# Patient Record
Sex: MALE | Race: WHITE | Employment: FULL TIME | URBAN - NONMETROPOLITAN AREA
[De-identification: names, ages, dates, MRNs, and addresses within clinical notes are randomized per-mention and may not be internally consistent; named-entity substitution may affect disease eponyms.]

---

## 2017-10-24 ENCOUNTER — TELEPHONE (OUTPATIENT)
Dept: FAMILY MEDICINE CLINIC | Facility: CLINIC | Age: 47
End: 2017-10-24

## 2017-10-24 ENCOUNTER — OFFICE VISIT (OUTPATIENT)
Dept: FAMILY MEDICINE CLINIC | Facility: CLINIC | Age: 47
End: 2017-10-24

## 2017-10-24 VITALS
OXYGEN SATURATION: 98 % | BODY MASS INDEX: 40.43 KG/M2 | SYSTOLIC BLOOD PRESSURE: 130 MMHG | TEMPERATURE: 98 F | WEIGHT: 315 LBS | HEART RATE: 80 BPM | HEIGHT: 74 IN | DIASTOLIC BLOOD PRESSURE: 110 MMHG

## 2017-10-24 DIAGNOSIS — M54.12 LEFT CERVICAL RADICULOPATHY: Primary | ICD-10-CM

## 2017-10-24 PROCEDURE — L0120 CERV FLEX N/ADJ FOAM PRE OTS: HCPCS | Performed by: FAMILY MEDICINE

## 2017-10-24 PROCEDURE — 99214 OFFICE O/P EST MOD 30 MIN: CPT | Performed by: FAMILY MEDICINE

## 2017-10-24 RX ORDER — HYDROCODONE BITARTRATE AND ACETAMINOPHEN 10; 325 MG/1; MG/1
1 TABLET ORAL EVERY 4 HOURS PRN
Qty: 30 TABLET | Refills: 0 | Status: SHIPPED | OUTPATIENT
Start: 2017-10-24 | End: 2017-11-06 | Stop reason: ALTCHOICE

## 2017-10-24 RX ORDER — PREDNISONE 20 MG/1
TABLET ORAL
Qty: 18 TABLET | Refills: 0 | Status: SHIPPED | OUTPATIENT
Start: 2017-10-24 | End: 2017-11-06 | Stop reason: ALTCHOICE

## 2017-10-24 RX ORDER — HYDROCODONE BITARTRATE AND ACETAMINOPHEN 5; 325 MG/1; MG/1
1-2 TABLET ORAL EVERY 4 HOURS
COMMUNITY
Start: 2017-10-19 | End: 2018-05-27

## 2017-10-24 RX ORDER — TRAMADOL HYDROCHLORIDE 50 MG/1
50 TABLET ORAL 3 TIMES DAILY
Qty: 30 TABLET | Refills: 1 | Status: SHIPPED | OUTPATIENT
Start: 2017-10-24 | End: 2017-11-06 | Stop reason: ALTCHOICE

## 2017-10-24 NOTE — TELEPHONE ENCOUNTER
Patient needs MRI Cervical spine   Dx: U61.78    Calling to scheduled.   wants this done ASAP   Patient advised he will go wherever to have done   Authorization # [id number]  Valid 10/24-11/22/17    Spoke with Sherry Billings  Future Appointments  Date Time Provide

## 2017-10-24 NOTE — PROGRESS NOTES
Darrick Bonilla is a 41year old male. Patient presents with: Other: f/up from [facility] ER on 10/19--still having pain in LT side face, through neck, and down LT arm. ....... room 1      HPI:   Patient awoke in the morning on October 19 with severe excrucia shortness of breath   CARDIOVASCULAR: denies chest pain   GI: denies nausea, vomiting, diarrhea or abdominal pain   NEURO: denies headaches    EXAM:   BP (!) 130/110   Pulse 80   Temp 97.8 °F (36.6 °C) (Tympanic)   Ht 74\"   Wt (!) 317 lb 2 oz   SpO2 98% days then 1 po q am x 3 days      HYDROcodone-acetaminophen (NORCO)  MG Oral Tab 30 tablet 0      Sig: Take 1 tablet by mouth every 4 (four) hours as needed for Pain.       TraMADol HCl 50 MG Oral Tab 30 tablet 1      Sig: Take 1 tablet (50 mg total)

## 2017-10-25 ENCOUNTER — HOSPITAL ENCOUNTER (OUTPATIENT)
Dept: MRI IMAGING | Age: 47
Discharge: HOME | End: 2017-10-25
Attending: FAMILY MEDICINE
Payer: COMMERCIAL

## 2017-10-25 ENCOUNTER — TELEPHONE (OUTPATIENT)
Dept: FAMILY MEDICINE CLINIC | Facility: CLINIC | Age: 47
End: 2017-10-25

## 2017-10-25 DIAGNOSIS — M54.12 LEFT CERVICAL RADICULOPATHY: ICD-10-CM

## 2017-10-25 PROCEDURE — 72141 MRI NECK SPINE W/O DYE: CPT | Performed by: FAMILY MEDICINE

## 2017-10-25 NOTE — PROGRESS NOTES
Patient notified the MRI is unremarkable. He continues to have significant pain. Please try to get him an appointment with the pain clinic.

## 2017-10-25 NOTE — TELEPHONE ENCOUNTER
MRI was normal (see result note for more notes).    I called [facility] to try and get the patient in Thursday or Friday of this week   Dx: Left Cervical Radiculopathy

## 2017-10-26 ENCOUNTER — TELEPHONE (OUTPATIENT)
Dept: FAMILY MEDICINE CLINIC | Facility: CLINIC | Age: 47
End: 2017-10-26

## 2017-10-26 NOTE — TELEPHONE ENCOUNTER
Saundra Grace has gone for the day, Spoke with Pura Perez wanted to report Herssujey Chamberses has pinpoint tenderness over the left S-C.

## 2017-10-26 NOTE — TELEPHONE ENCOUNTER
Spoke with Luis Alberto Metzger, she scheduled OV with Dr. Remington Sanchez tomorrow @ [address] [place] [address] in [facility]. [address] [place] [address], [address]. She is calling the patient to advise.

## 2017-10-27 ENCOUNTER — OFFICE VISIT (OUTPATIENT)
Dept: SURGERY | Facility: CLINIC | Age: 47
End: 2017-10-27

## 2017-10-27 VITALS
WEIGHT: 300 LBS | DIASTOLIC BLOOD PRESSURE: 80 MMHG | HEART RATE: 82 BPM | SYSTOLIC BLOOD PRESSURE: 132 MMHG | HEIGHT: 74 IN | RESPIRATION RATE: 16 BRPM | BODY MASS INDEX: 38.5 KG/M2

## 2017-10-27 DIAGNOSIS — M54.12 CERVICAL RADICULITIS: Primary | ICD-10-CM

## 2017-10-27 DIAGNOSIS — M47.812 ARTHROPATHY OF CERVICAL FACET JOINT: ICD-10-CM

## 2017-10-27 PROCEDURE — 99204 OFFICE O/P NEW MOD 45 MIN: CPT | Performed by: ANESTHESIOLOGY

## 2017-10-27 RX ORDER — NAPROXEN 500 MG/1
500 TABLET ORAL 2 TIMES DAILY WITH MEALS
Qty: 60 TABLET | Refills: 0 | Status: SHIPPED | OUTPATIENT
Start: 2017-10-27 | End: 2018-05-27

## 2017-10-27 RX ORDER — CYCLOBENZAPRINE HCL 10 MG
10 TABLET ORAL 3 TIMES DAILY PRN
Qty: 90 TABLET | Refills: 0 | Status: SHIPPED | OUTPATIENT
Start: 2017-10-27 | End: 2018-05-27

## 2017-10-27 NOTE — PROGRESS NOTES
HPI:    Patient ID: Mil Orlando is a 41year old male. HPI    Review of Systems         Current Outpatient Prescriptions:  Diclofenac Sodium 50 MG Oral Tab EC Take 50 mg by mouth 2 (two) times daily.  Disp:  Rfl:    HYDROcodone-acetaminophen 5-325 MG Ora #[id number]

## 2017-10-27 NOTE — PATIENT INSTRUCTIONS
Refill policies:    • Allow 2-3 business days for refills; controlled substances may take longer.   • Contact your pharmacy at least 5 days prior to running out of medication and have them send an electronic request or submit request through the [facility] have a procedure or additional testing performed. [facility]) will contact your insurance carrier to obtain pre-certification or prior authorization.     Unfortunately, [facility] has seen an increase in denial of payment even though the p

## 2017-10-28 NOTE — PROGRESS NOTES
Name: Maisha Zavala   : 1970   DOS: 10/27/2017     Chief complaint: Neck Pain    History of present illness:  Maisha Zavala is a 41year old male complaining of pain in the neck for 10 days. He had done yard work recently.   But he did not have pain dur hours as needed for Pain (last dose at 0600). Disp:  Rfl:    Fluticasone Propionate (FLONASE) 50 MCG/ACT Nasal Suspension 2 sprays by Nasal route daily. Disp: 1 Bottle Rfl: 3      No past surgical history on file. No family history on file.   Smoking stat lymphadenopathy. Severe tenderness over the cervical paravertebral and trapezius muscles. Flexion of the neck makes the pain better, extention and lateral rotation of the neck makes the pain worse.  Elevation of the head makes the pain better, compression o (R)   (L)   Babinski:               N                          N   Quick:    N    N   Ankle Clonus:    N                          N  Sensory Examination:    (R)   (L)   LI:      N                          N   L2:      N     N   L3:      N

## 2017-11-01 ENCOUNTER — TELEPHONE (OUTPATIENT)
Dept: SURGERY | Facility: CLINIC | Age: 47
End: 2017-11-01

## 2017-11-06 ENCOUNTER — TELEPHONE (OUTPATIENT)
Dept: FAMILY MEDICINE CLINIC | Facility: CLINIC | Age: 47
End: 2017-11-06

## 2017-11-06 ENCOUNTER — OFFICE VISIT (OUTPATIENT)
Dept: FAMILY MEDICINE CLINIC | Facility: CLINIC | Age: 47
End: 2017-11-06

## 2017-11-06 ENCOUNTER — TELEPHONE (OUTPATIENT)
Dept: SURGERY | Facility: CLINIC | Age: 47
End: 2017-11-06

## 2017-11-06 VITALS
TEMPERATURE: 98 F | BODY MASS INDEX: 39 KG/M2 | SYSTOLIC BLOOD PRESSURE: 120 MMHG | OXYGEN SATURATION: 98 % | WEIGHT: 307.38 LBS | HEART RATE: 96 BPM | DIASTOLIC BLOOD PRESSURE: 100 MMHG

## 2017-11-06 DIAGNOSIS — M54.2 NECK PAIN: Primary | ICD-10-CM

## 2017-11-06 DIAGNOSIS — M54.12 CERVICAL RADICULOPATHY: ICD-10-CM

## 2017-11-06 PROCEDURE — 99214 OFFICE O/P EST MOD 30 MIN: CPT | Performed by: FAMILY MEDICINE

## 2017-11-06 RX ORDER — PREDNISONE 20 MG/1
20 TABLET ORAL 2 TIMES DAILY
Qty: 14 TABLET | Refills: 0 | Status: SHIPPED | OUTPATIENT
Start: 2017-11-06 | End: 2017-11-13

## 2017-11-06 NOTE — PROGRESS NOTES
HPI:    Patient ID: Frank Adam is a 41year old male.   X 10 days  L neck pain / L shoulder / arm  W/o trauma  Some relief w/ pred  Not happy w/ current pain clinic  W/o problems w/ meds  HPI    Review of Systems   Musculoskeletal: Positive for neck pain a in this encounter. Meds This Visit:  Signed Prescriptions Disp Refills    predniSONE 20 MG Oral Tab 14 tablet 0      Sig: Take 1 tablet (20 mg total) by mouth 2 (two) times daily.            Imaging & Referrals:  None       KG#[id number]

## 2017-11-06 NOTE — TELEPHONE ENCOUNTER
Alyssa Figueroa wants to give nurse an update as to his condition before his appointment with Dr Vu Dean at [facility]

## 2017-11-06 NOTE — TELEPHONE ENCOUNTER
Calling Ninoska with [address] [place] [address]    He is in terrible pain- he is now swollen - he did go to the pain clinic. She did recommend that he call them again about the pain  Last week on Monday  he was feeling great.  As of Thursday the swelling and the pain came

## 2017-11-06 NOTE — TELEPHONE ENCOUNTER
Spoke w/ pt who states conservative measures with medication not helping. Per OV notes with Dr. John Dahl (below) pt recommended for 4 procedures. Offered to schedule pt for recommended procedures.  Pt declined stating, \"No, i'll go somewhere else\" due to ti

## 2017-11-06 NOTE — PATIENT INSTRUCTIONS
Continue medications as directed. Call with questions or problems. Discussed and offered activations, patient not interested. Continue with physical therapy.

## 2017-11-21 ENCOUNTER — TELEPHONE (OUTPATIENT)
Dept: FAMILY MEDICINE CLINIC | Facility: CLINIC | Age: 47
End: 2017-11-21

## 2018-04-23 ENCOUNTER — OFFICE VISIT (OUTPATIENT)
Dept: FAMILY MEDICINE CLINIC | Facility: CLINIC | Age: 48
End: 2018-04-23

## 2018-04-23 VITALS
HEART RATE: 84 BPM | SYSTOLIC BLOOD PRESSURE: 108 MMHG | TEMPERATURE: 98 F | DIASTOLIC BLOOD PRESSURE: 76 MMHG | RESPIRATION RATE: 18 BRPM | OXYGEN SATURATION: 98 %

## 2018-04-23 DIAGNOSIS — R30.0 DYSURIA: Primary | ICD-10-CM

## 2018-04-23 DIAGNOSIS — Z87.448 HX OF URETHRAL STRICTURE: ICD-10-CM

## 2018-04-23 PROCEDURE — 87086 URINE CULTURE/COLONY COUNT: CPT | Performed by: NURSE PRACTITIONER

## 2018-04-23 PROCEDURE — 81003 URINALYSIS AUTO W/O SCOPE: CPT | Performed by: NURSE PRACTITIONER

## 2018-04-23 PROCEDURE — 87147 CULTURE TYPE IMMUNOLOGIC: CPT | Performed by: NURSE PRACTITIONER

## 2018-04-23 PROCEDURE — 99213 OFFICE O/P EST LOW 20 MIN: CPT | Performed by: NURSE PRACTITIONER

## 2018-04-23 PROCEDURE — 87186 SC STD MICRODIL/AGAR DIL: CPT | Performed by: NURSE PRACTITIONER

## 2018-04-23 RX ORDER — NITROFURANTOIN 25; 75 MG/1; MG/1
100 CAPSULE ORAL 2 TIMES DAILY
Qty: 20 CAPSULE | Refills: 0 | Status: SHIPPED | OUTPATIENT
Start: 2018-04-23 | End: 2018-05-03

## 2018-04-23 NOTE — PATIENT INSTRUCTIONS
Bladder Infection, Male (Adult)    You have a bladder infection. Urine is normally free of bacteria. But bacteria can get into the urinary tract from the skin around the rectum or it may travel in the blood from elsewhere in the body.   This is called a The most common cause of bladder infections is bacteria from the bowels. The bacteria get onto the skin around the opening of the urethra. From there they can get into the urine and travel up to the bladder. This causes inflammation and an infection.  This · Drink plenty of fluids, unless your healthcare provider told you not to. Fluids will prevent dehydration and flush out your bladder. · Use good personal hygiene. Wipe from front to back after using the toilet, and clean your penis regularly.  If you aren © 9121-0869 [employer] [address]. [address] [facility], [facility] [place] [facility]. All rights reserved. This information is not intended as a substitute for professional medical care. Always follow your healthcare professional's instructions.

## 2018-04-23 NOTE — PROGRESS NOTES
CHIEF COMPLAINT:   Patient presents with:  Dysuria      HPI:   Bernadette Carranza is a 41year old male who presents with symptoms of UTI. Complaining of urinary frequency,body aches, chills and fatigue.  Reports has history of urethral stricture and does self d /76   Pulse 84   Temp 98.1 °F (36.7 °C) (Oral)   Resp 18   SpO2 98%   GENERAL: well developed, well nourished,in no apparent distress  CARDIO: RRR, no murmurs  LUNGS: clear to ausculation bilaterally, no wheezing or rhonchi  GI: BS present x 4.   No h Urine is normally free of bacteria. But bacteria can get into the urinary tract from the skin around the rectum or it may travel in the blood from elsewhere in the body. This is called a urinary tract infection (UTI).  An infection can occur anywhere in th The most common cause of bladder infections is bacteria from the bowels. The bacteria get onto the skin around the opening of the urethra. From there they can get into the urine and travel up to the bladder. This causes inflammation and an infection.  This · Drink plenty of fluids, unless your healthcare provider told you not to. Fluids will prevent dehydration and flush out your bladder. · Use good personal hygiene. Wipe from front to back after using the toilet, and clean your penis regularly.  If you aren © 3587-9899 [employer] [address]. [address] [facility], [facility] [place]. All rights reserved. This information is not intended as a substitute for professional medical care. Always follow your healthcare professional's instructions.               Benoit Shah

## 2018-05-27 ENCOUNTER — OFFICE VISIT (OUTPATIENT)
Dept: FAMILY MEDICINE CLINIC | Facility: CLINIC | Age: 48
End: 2018-05-27

## 2018-05-27 VITALS
HEIGHT: 74 IN | DIASTOLIC BLOOD PRESSURE: 86 MMHG | OXYGEN SATURATION: 97 % | BODY MASS INDEX: 38.5 KG/M2 | HEART RATE: 96 BPM | WEIGHT: 300 LBS | SYSTOLIC BLOOD PRESSURE: 136 MMHG | RESPIRATION RATE: 20 BRPM | TEMPERATURE: 98 F

## 2018-05-27 DIAGNOSIS — J01.00 ACUTE NON-RECURRENT MAXILLARY SINUSITIS: ICD-10-CM

## 2018-05-27 DIAGNOSIS — R39.9 UTI SYMPTOMS: Primary | ICD-10-CM

## 2018-05-27 PROCEDURE — 81003 URINALYSIS AUTO W/O SCOPE: CPT | Performed by: NURSE PRACTITIONER

## 2018-05-27 PROCEDURE — 87086 URINE CULTURE/COLONY COUNT: CPT | Performed by: NURSE PRACTITIONER

## 2018-05-27 PROCEDURE — 87186 SC STD MICRODIL/AGAR DIL: CPT | Performed by: NURSE PRACTITIONER

## 2018-05-27 PROCEDURE — 99213 OFFICE O/P EST LOW 20 MIN: CPT | Performed by: NURSE PRACTITIONER

## 2018-05-27 PROCEDURE — 87077 CULTURE AEROBIC IDENTIFY: CPT | Performed by: NURSE PRACTITIONER

## 2018-05-27 RX ORDER — CEPHALEXIN 500 MG/1
500 CAPSULE ORAL 4 TIMES DAILY
Qty: 40 CAPSULE | Refills: 0 | Status: SHIPPED | OUTPATIENT
Start: 2018-05-27 | End: 2018-06-06

## 2018-05-27 NOTE — PROGRESS NOTES
CHIEF COMPLAINT:   Patient presents with:  Sinusitis: SINUS PRESSURE , COUGH , PND , SORE THROAT X 6 DAYS   UTI: BODY ACHES , CHILLS X 1 DAY       HPI:   Beverley Puga is a 41year old male who presents with symptoms of UTI.  Complaining of urinary issues f -URINALYSIS, AUTO, W/O SCOPE   Collection Time: 05/27/18  9:32 AM   Result Value Ref Range   GLUCOSE (URINE DIPSTICK) NEG mg/dL   BILIRUBIN NEG Negative   KETONES (URINE DIPSTICK) NEG Negative mg/dL   SPECIFIC GRAVITY 1.020 1.005 - 1.030   OCCULT BLOOD NEG Sinuses are air-filled spaces in the skull behind the face. They are kept moist and clean by a lining of mucosa. Things such as pollen, smoke, and chemical fumes can irritate the mucosa. It can then swell up.  As a response to irritation, the mucosa makes m © 2547-0224 [employer] [address]. [address] [facility] [place]. All rights reserved. This information is not intended as a substitute for professional medical care. Always follow your healthcare professional's instructions.

## 2018-05-27 NOTE — PATIENT INSTRUCTIONS
Use Mucinex for sinus issues  Tylenol or Motrin for pain or fever  Increase water/fluid in diet  Will check urine culture      Acute Sinusitis    Acute sinusitis is irritation and swelling of the sinuses.  It is usually caused by a viral infection after a c Treatment is aimed at unblocking the sinus opening and helping the cilia work again. You may need to take antihistamine and decongestant medicine. These can reduce inflammation and decrease the amount of fluid your sinuses make.  If you have a bacterial inf

## 2018-05-31 RX ORDER — NITROFURANTOIN 25; 75 MG/1; MG/1
100 CAPSULE ORAL 2 TIMES DAILY
Qty: 20 CAPSULE | Refills: 0 | Status: SHIPPED | OUTPATIENT
Start: 2018-05-31 | End: 2018-06-10

## 2018-06-25 ENCOUNTER — OFFICE VISIT (OUTPATIENT)
Dept: FAMILY MEDICINE CLINIC | Facility: CLINIC | Age: 48
End: 2018-06-25

## 2018-06-25 VITALS
SYSTOLIC BLOOD PRESSURE: 138 MMHG | TEMPERATURE: 98 F | DIASTOLIC BLOOD PRESSURE: 82 MMHG | BODY MASS INDEX: 41 KG/M2 | WEIGHT: 315 LBS

## 2018-06-25 DIAGNOSIS — R30.0 DYSURIA: Primary | ICD-10-CM

## 2018-06-25 PROCEDURE — 87086 URINE CULTURE/COLONY COUNT: CPT | Performed by: FAMILY MEDICINE

## 2018-06-25 PROCEDURE — 87077 CULTURE AEROBIC IDENTIFY: CPT | Performed by: FAMILY MEDICINE

## 2018-06-25 PROCEDURE — 99214 OFFICE O/P EST MOD 30 MIN: CPT | Performed by: FAMILY MEDICINE

## 2018-06-25 PROCEDURE — 81003 URINALYSIS AUTO W/O SCOPE: CPT | Performed by: FAMILY MEDICINE

## 2018-06-25 PROCEDURE — 87186 SC STD MICRODIL/AGAR DIL: CPT | Performed by: FAMILY MEDICINE

## 2018-06-25 RX ORDER — NITROFURANTOIN 25; 75 MG/1; MG/1
100 CAPSULE ORAL 2 TIMES DAILY
Qty: 42 CAPSULE | Refills: 0 | Status: SHIPPED | OUTPATIENT
Start: 2018-06-25 | End: 2019-08-23 | Stop reason: ALTCHOICE

## 2018-06-25 NOTE — PROGRESS NOTES
Kenny Solis is a 41year old male. Patient presents with: Other: achy, sweats and chills. . pt has reoccuring uti issues and pt concerned . . started yesterday. . room 2      HPI:   Patient has been feeling chills, sweats and achiness.   He has had this cons normal, L TM normal, Pharynx normal  NECK: supple, no cervical adenopathy  LUNGS: clear to auscultation  CARDIO: RRR without murmur  ABD soft, nontender, normal BS, no masses, rebound or guarding. No organomegaly or CVA tenderness.   EXTREMITIES: no edema mouth 2 (two) times daily.            Imaging & Consults:  UROLOGY - INTERNAL

## 2018-06-26 ENCOUNTER — TELEPHONE (OUTPATIENT)
Dept: FAMILY MEDICINE CLINIC | Facility: CLINIC | Age: 48
End: 2018-06-26

## 2018-06-26 NOTE — TELEPHONE ENCOUNTER
Wife called back- she is concerned because her  was stating that he is being treated for MRSA and she is wondering if she and her son need to be tested? Or should they be on an antibiotic??     I explained that it is contagious.  It is important to w

## 2018-06-28 PROCEDURE — 87086 URINE CULTURE/COLONY COUNT: CPT | Performed by: UROLOGY

## 2019-08-23 ENCOUNTER — OFFICE VISIT (OUTPATIENT)
Dept: FAMILY MEDICINE CLINIC | Facility: CLINIC | Age: 49
End: 2019-08-23
Payer: COMMERCIAL

## 2019-08-23 ENCOUNTER — APPOINTMENT (OUTPATIENT)
Dept: LAB | Age: 49
End: 2019-08-23
Attending: FAMILY MEDICINE
Payer: COMMERCIAL

## 2019-08-23 VITALS
WEIGHT: 267.38 LBS | HEIGHT: 73.5 IN | HEART RATE: 88 BPM | BODY MASS INDEX: 34.68 KG/M2 | OXYGEN SATURATION: 98 % | SYSTOLIC BLOOD PRESSURE: 130 MMHG | DIASTOLIC BLOOD PRESSURE: 86 MMHG | TEMPERATURE: 98 F

## 2019-08-23 DIAGNOSIS — Z12.5 PROSTATE CANCER SCREENING: ICD-10-CM

## 2019-08-23 DIAGNOSIS — Z00.00 PREVENTATIVE HEALTH CARE: ICD-10-CM

## 2019-08-23 DIAGNOSIS — Z00.00 PREVENTATIVE HEALTH CARE: Primary | ICD-10-CM

## 2019-08-23 DIAGNOSIS — Z87.448 HX OF URETHRAL STRICTURE: ICD-10-CM

## 2019-08-23 LAB
ANION GAP SERPL CALC-SCNC: 7 MMOL/L (ref 0–18)
BUN BLD-MCNC: 14 MG/DL (ref 7–18)
BUN/CREAT SERPL: 13.3 (ref 10–20)
CALCIUM BLD-MCNC: 8.7 MG/DL (ref 8.5–10.1)
CHLORIDE SERPL-SCNC: 106 MMOL/L (ref 98–112)
CHOLEST SMN-MCNC: 160 MG/DL (ref ?–200)
CO2 SERPL-SCNC: 27 MMOL/L (ref 21–32)
COMPLEXED PSA SERPL-MCNC: 2.84 NG/ML (ref ?–4)
CREAT BLD-MCNC: 1.05 MG/DL (ref 0.7–1.3)
GLUCOSE BLD-MCNC: 92 MG/DL (ref 70–99)
HDLC SERPL-MCNC: 47 MG/DL (ref 40–59)
LDLC SERPL CALC-MCNC: 100 MG/DL (ref ?–100)
NONHDLC SERPL-MCNC: 113 MG/DL (ref ?–130)
OSMOLALITY SERPL CALC.SUM OF ELEC: 290 MOSM/KG (ref 275–295)
POTASSIUM SERPL-SCNC: 4.2 MMOL/L (ref 3.5–5.1)
SODIUM SERPL-SCNC: 140 MMOL/L (ref 136–145)
TRIGL SERPL-MCNC: 66 MG/DL (ref 30–149)
VLDLC SERPL CALC-MCNC: 13 MG/DL (ref 0–30)

## 2019-08-23 PROCEDURE — 90471 IMMUNIZATION ADMIN: CPT | Performed by: FAMILY MEDICINE

## 2019-08-23 PROCEDURE — 36415 COLL VENOUS BLD VENIPUNCTURE: CPT | Performed by: FAMILY MEDICINE

## 2019-08-23 PROCEDURE — 99396 PREV VISIT EST AGE 40-64: CPT | Performed by: FAMILY MEDICINE

## 2019-08-23 PROCEDURE — 90715 TDAP VACCINE 7 YRS/> IM: CPT | Performed by: FAMILY MEDICINE

## 2019-08-23 PROCEDURE — 80048 BASIC METABOLIC PNL TOTAL CA: CPT | Performed by: FAMILY MEDICINE

## 2019-08-23 PROCEDURE — 84153 ASSAY OF PSA TOTAL: CPT | Performed by: FAMILY MEDICINE

## 2019-08-23 PROCEDURE — 80061 LIPID PANEL: CPT | Performed by: FAMILY MEDICINE

## 2019-08-23 NOTE — H&P
Neetu Gordon is a 57year old male who presents for a complete physical exam.     Patient presents with:  CPX: annual physical....room 1      HPI:         No current outpatient medications on file prior to visit.   No current facility-administered medication Oriented times three,cranial nerves are intact,motor and sensory are grossly intact    Office Visit on 06/28/2018   Component Date Value Ref Range Status   • Glucose Urine 06/28/2018 neg  mg/dL Final   • Bilirubin 06/28/2018 neg  Negative Final   • Ketones

## 2019-08-24 DIAGNOSIS — Z00.00 PREVENTATIVE HEALTH CARE: Primary | ICD-10-CM

## 2019-08-24 DIAGNOSIS — Z12.5 PROSTATE CANCER SCREENING: ICD-10-CM

## 2020-06-04 ENCOUNTER — TELEPHONE (OUTPATIENT)
Dept: FAMILY MEDICINE CLINIC | Facility: CLINIC | Age: 50
End: 2020-06-04

## 2020-06-04 ENCOUNTER — TELEMEDICINE (OUTPATIENT)
Dept: FAMILY MEDICINE CLINIC | Facility: CLINIC | Age: 50
End: 2020-06-04
Payer: COMMERCIAL

## 2020-06-04 DIAGNOSIS — R30.0 DYSURIA: Primary | ICD-10-CM

## 2020-06-04 DIAGNOSIS — Z87.448 HX OF URETHRAL STRICTURE: ICD-10-CM

## 2020-06-04 PROCEDURE — 99213 OFFICE O/P EST LOW 20 MIN: CPT | Performed by: FAMILY MEDICINE

## 2020-06-04 RX ORDER — NITROFURANTOIN 25; 75 MG/1; MG/1
100 CAPSULE ORAL 2 TIMES DAILY
Qty: 20 CAPSULE | Refills: 0 | Status: SHIPPED | OUTPATIENT
Start: 2020-06-04 | End: 2020-06-15 | Stop reason: ALTCHOICE

## 2020-06-04 NOTE — PROGRESS NOTES
Telemedicine Visit    Irlanda Lantigua  verbally consents to a Telemedicine service on 6/4/2020 . Patient understands and accepts financial responsibility for any deductible, co-insurance and/or co-pays associated with this service.       Duration of the servic No conversational dyspnea, able to finish sentences without loss of breath. Labs:  No visits with results within 6 Month(s) from this visit.    Latest known visit with results is:   Appointment on 08/23/2019   Component Date Value Ref Range Status   • in biotin serum concentrations >100 ng/mL. It is recommended that                            physicians ask all patients who may be on biotin supplementation to stop biotin consumption at least 72 hours prior to collection of a new sample.    • Cholesterol, worsen. Patient had an opportunity to ask questions and they were answered to his satisfaction.       Meds & Refills for this Visit:  Requested Prescriptions     Signed Prescriptions Disp Refills   • Nitrofurantoin Monohyd Macro 100 MG Oral Cap 20 capsul spent for the visit.

## 2020-06-04 NOTE — TELEPHONE ENCOUNTER
Virtual/Telephone Check-In    Kassandra Macario verbally consents to a Virtual/Telephone Check-In service on 06/04/20. Patient has been referred to the [facility] website at [email or web address] to review the yearly Consent to Treat document.   Patient understands

## 2020-06-15 ENCOUNTER — TELEPHONE (OUTPATIENT)
Dept: FAMILY MEDICINE CLINIC | Facility: CLINIC | Age: 50
End: 2020-06-15

## 2020-06-15 ENCOUNTER — OFFICE VISIT (OUTPATIENT)
Dept: FAMILY MEDICINE CLINIC | Facility: CLINIC | Age: 50
End: 2020-06-15
Payer: COMMERCIAL

## 2020-06-15 VITALS
WEIGHT: 288.38 LBS | TEMPERATURE: 98 F | DIASTOLIC BLOOD PRESSURE: 80 MMHG | OXYGEN SATURATION: 98 % | HEART RATE: 68 BPM | BODY MASS INDEX: 37.41 KG/M2 | SYSTOLIC BLOOD PRESSURE: 120 MMHG | HEIGHT: 73.5 IN

## 2020-06-15 DIAGNOSIS — R30.0 DYSURIA: Primary | ICD-10-CM

## 2020-06-15 DIAGNOSIS — R53.83 FATIGUE, UNSPECIFIED TYPE: ICD-10-CM

## 2020-06-15 PROCEDURE — 87086 URINE CULTURE/COLONY COUNT: CPT | Performed by: FAMILY MEDICINE

## 2020-06-15 PROCEDURE — 99213 OFFICE O/P EST LOW 20 MIN: CPT | Performed by: FAMILY MEDICINE

## 2020-06-15 PROCEDURE — 81003 URINALYSIS AUTO W/O SCOPE: CPT | Performed by: FAMILY MEDICINE

## 2020-06-15 RX ORDER — NITROFURANTOIN 25; 75 MG/1; MG/1
100 CAPSULE ORAL 2 TIMES DAILY
Qty: 20 CAPSULE | Refills: 0 | Status: SHIPPED | OUTPATIENT
Start: 2020-06-15 | End: 2020-09-24

## 2020-06-15 NOTE — PROGRESS NOTES
Danielle Gamino is a 54year old male. Patient presents with:  Dysuria: fagitued, achiness, dysuria--started 1.5 wks ago approx-went through meds-as soon as the meds were gone, sx started coming back again. ...room 2        HPI:   Patient has a history of part murmur  ABD soft, nontender, normal BS, no masses, rebound or guarding. No organomegaly or CVA tenderness.   EXTREMITIES: no edema    Recent Results (from the past 24 hour(s))   URINALYSIS, AUTO, W/O SCOPE    Collection Time: 06/15/20  1:31 PM   Result Valu

## 2020-06-15 NOTE — TELEPHONE ENCOUNTER
It would be better to schedule an in office visit.   He will need a urine to see if it still infected and I need to check his back to see if there is any evidence of kidney infection

## 2020-06-15 NOTE — TELEPHONE ENCOUNTER
Called patient and left message for patient to call office with new insurance information.  [facility] labor fund termed as of 08/31/2019

## 2020-06-15 NOTE — TELEPHONE ENCOUNTER
Completed abx for suspected UTI. Pt reports relief with symtoms while on abx. Completed abx on Saturday. Symptoms came back yesterday. Reports slight fever (temp unknown), chills, body aches, urinary frequency - states he has chronic UTI's.      Should

## 2020-06-17 ENCOUNTER — PATIENT MESSAGE (OUTPATIENT)
Dept: FAMILY MEDICINE CLINIC | Facility: CLINIC | Age: 50
End: 2020-06-17

## 2020-06-17 NOTE — TELEPHONE ENCOUNTER
From: Kassandra Macario  To: Olga Tolentino DO  Sent: 6/17/2020 4:03 PM CDT  Subject: Test Results Question    I have a question about URINE CULTURE, ROUTINE resulted on 6/17/20, 11:30 AM.    Is it safe to stop taking it now?

## 2020-08-10 ENCOUNTER — TELEPHONE (OUTPATIENT)
Dept: FAMILY MEDICINE CLINIC | Facility: CLINIC | Age: 50
End: 2020-08-10

## 2020-08-10 NOTE — TELEPHONE ENCOUNTER
Kwesi called and left a  saying that he was exposed to a COVID positive person on a job site last week, he would like to know where he could go get a COVID test done. He has gone to several COVID testing site but they are not able to do it.   Please advise

## 2020-08-10 NOTE — TELEPHONE ENCOUNTER
Spoke with pt and discussed his potential exposure    Everyone on his job site wear masks. He has no symptoms. 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  He believes though that he will need a Negative test before they will let him g

## 2020-09-24 ENCOUNTER — OFFICE VISIT (OUTPATIENT)
Dept: FAMILY MEDICINE CLINIC | Facility: CLINIC | Age: 50
End: 2020-09-24
Payer: COMMERCIAL

## 2020-09-24 VITALS
TEMPERATURE: 98 F | SYSTOLIC BLOOD PRESSURE: 128 MMHG | DIASTOLIC BLOOD PRESSURE: 80 MMHG | RESPIRATION RATE: 12 BRPM | WEIGHT: 300 LBS | HEART RATE: 86 BPM | HEIGHT: 73.5 IN | OXYGEN SATURATION: 94 % | BODY MASS INDEX: 38.91 KG/M2

## 2020-09-24 DIAGNOSIS — N39.0 RECURRENT UTI: ICD-10-CM

## 2020-09-24 DIAGNOSIS — Z87.448 HX OF URETHRAL STRICTURE: ICD-10-CM

## 2020-09-24 DIAGNOSIS — R30.0 DYSURIA: Primary | ICD-10-CM

## 2020-09-24 LAB
APPEARANCE: CLEAR
MULTISTIX LOT#: 1044 NUMERIC
PH, URINE: 6 (ref 4.5–8)
PROTEIN (URINE DIPSTICK): 100 MG/DL
URINE-COLOR: YELLOW
UROBILINOGEN,SEMI-QN: 1 MG/DL (ref 0–1.9)

## 2020-09-24 PROCEDURE — 3008F BODY MASS INDEX DOCD: CPT | Performed by: FAMILY MEDICINE

## 2020-09-24 PROCEDURE — 87086 URINE CULTURE/COLONY COUNT: CPT | Performed by: FAMILY MEDICINE

## 2020-09-24 PROCEDURE — 3074F SYST BP LT 130 MM HG: CPT | Performed by: FAMILY MEDICINE

## 2020-09-24 PROCEDURE — 87147 CULTURE TYPE IMMUNOLOGIC: CPT | Performed by: FAMILY MEDICINE

## 2020-09-24 PROCEDURE — 99213 OFFICE O/P EST LOW 20 MIN: CPT | Performed by: FAMILY MEDICINE

## 2020-09-24 PROCEDURE — 81003 URINALYSIS AUTO W/O SCOPE: CPT | Performed by: FAMILY MEDICINE

## 2020-09-24 PROCEDURE — 3079F DIAST BP 80-89 MM HG: CPT | Performed by: FAMILY MEDICINE

## 2020-09-24 RX ORDER — NITROFURANTOIN 25; 75 MG/1; MG/1
100 CAPSULE ORAL 2 TIMES DAILY
Qty: 20 CAPSULE | Refills: 2 | Status: SHIPPED | OUTPATIENT
Start: 2020-09-24 | End: 2020-10-19

## 2020-09-24 NOTE — PROGRESS NOTES
Neetu Gordon is a 52year old male. Patient presents with:  UTI: inrm.  4      Chief Complaint Reviewed and Verified  Nursing Notes Reviewed and   Verified  Tobacco Reviewed  Allergies Reviewed  Medications Reviewed    Problem List Reviewed  Medical History Size: large)   Pulse 86   Temp 98 °F (36.7 °C) (Temporal)   Resp 12   Ht 73.5\"   Wt 300 lb (136.1 kg)   SpO2 94%   BMI 39.04 kg/m²  Body mass index is 39.04 kg/m².       GENERAL: well developed, well nourished,in no apparent distress  SKIN: no rashes,no vang plan.

## 2020-10-19 ENCOUNTER — HOSPITAL ENCOUNTER (OUTPATIENT)
Age: 50
Discharge: HOME | End: 2020-10-19
Payer: COMMERCIAL

## 2020-10-19 VITALS
TEMPERATURE: 97 F | RESPIRATION RATE: 16 BRPM | BODY MASS INDEX: 38 KG/M2 | WEIGHT: 294 LBS | HEART RATE: 78 BPM | SYSTOLIC BLOOD PRESSURE: 136 MMHG | DIASTOLIC BLOOD PRESSURE: 92 MMHG | OXYGEN SATURATION: 98 %

## 2020-10-19 DIAGNOSIS — R51.9 HEADACHE: Primary | ICD-10-CM

## 2020-10-19 DIAGNOSIS — B34.9 VIRAL SYNDROME: ICD-10-CM

## 2020-10-19 PROCEDURE — U0003 INFECTIOUS AGENT DETECTION BY NUCLEIC ACID (DNA OR RNA); SEVERE ACUTE RESPIRATORY SYNDROME CORONAVIRUS 2 (SARS-COV-2) (CORONAVIRUS DISEASE [COVID-19]), AMPLIFIED PROBE TECHNIQUE, MAKING USE OF HIGH THROUGHPUT TECHNOLOGIES AS DESCRIBED BY CMS-2020-01-R: HCPCS | Performed by: PHYSICIAN ASSISTANT

## 2020-10-19 PROCEDURE — 99213 OFFICE O/P EST LOW 20 MIN: CPT | Performed by: PHYSICIAN ASSISTANT

## 2020-10-19 RX ORDER — IBUPROFEN 200 MG
400 TABLET ORAL EVERY 8 HOURS PRN
Status: ON HOLD | COMMUNITY
End: 2021-01-07

## 2020-10-19 NOTE — ED PROVIDER NOTES
Patient Seen in: Immediate [facility]      History   Patient presents with:  Headache  Fatigue  Body ache and/or chills    Stated Complaint: Headache - Headache fatigue joint pain    HPI    Pleasant 43-year-old male. Occasional smoker.   Arrives with wi Atraumatic  Lung: No distress, RR, no retraction, breath sounds clear bilaterally  Extremities: Full ROM, no deformity, NVI  Back: Full range of motion  Neuro: Cranial nerves intact, Normal Gait    ED Course     Labs Reviewed   SARS-COV-2 RNA,QUAL RT-PCR (

## 2020-11-08 DIAGNOSIS — N39.0 RECURRENT UTI: ICD-10-CM

## 2020-11-09 RX ORDER — NITROFURANTOIN 25; 75 MG/1; MG/1
CAPSULE ORAL
Qty: 20 CAPSULE | Refills: 2 | Status: SHIPPED | OUTPATIENT
Start: 2020-11-09 | End: 2020-12-01 | Stop reason: ALTCHOICE

## 2020-11-09 NOTE — TELEPHONE ENCOUNTER
LOV: 9-    LAST LAB: 9-    LAST RX:  Medication Quantity Refills Start End   Nitrofurantoin Monohyd Macro 100 MG Oral Cap (Discontinued) 20 capsule 2 9/24/2020 10/19/2020   Sig:   Take 1 capsule (100 mg total) by mouth 2 (two) times daily

## 2020-11-13 ENCOUNTER — TELEMEDICINE (OUTPATIENT)
Dept: FAMILY MEDICINE CLINIC | Facility: CLINIC | Age: 50
End: 2020-11-13

## 2020-11-13 DIAGNOSIS — Z20.822 SUSPECTED COVID-19 VIRUS INFECTION: Primary | ICD-10-CM

## 2020-11-13 NOTE — PROGRESS NOTES
Telemedicine Visit    Anthonyglenys Becker  verbally consents to a Telemedicine service on 11/13/2020 . Patient understands and accepts financial responsibility for any deductible, co-insurance and/or co-pays associated with this service.       Duration of the s Patient is alert and oriented. No indication of respiratory distress. No conversational dyspnea, able to finish sentences without loss of breath.     Labs:  Admission on 10/19/2020, Discharged on 10/19/2020   Component Date Value Ref Range Status   • for  COVID-19 testing should be cautiously evaluated and the  patient potentially subjected to extra precautions such  as additional clinical monitoring, including collection  of an additional specimen.      Methodology:  Nucleic Acid Amplification Test (NA Urine 06/15/2020 negative  mg/dL Final   • Bilirubin 06/15/2020 negative  Negative Final   • Ketones, UA 06/15/2020 negative  Negative mg/dL Final   • Spec Gravity 06/15/2020 1.020  1.005 - 1.030 Final   • Blood Urine 06/15/2020 negative  Negative Final communication.  This has been done in good kitty to provide continuity of care in the best interest of the provider-patient relationship, due to the COVID -19 public health crisis/national emergency where restrictions of face-to-face office visits are ongoi

## 2020-11-30 ENCOUNTER — TELEMEDICINE (OUTPATIENT)
Dept: FAMILY MEDICINE CLINIC | Facility: CLINIC | Age: 50
End: 2020-11-30
Payer: COMMERCIAL

## 2020-11-30 DIAGNOSIS — M54.41 ACUTE RIGHT-SIDED LOW BACK PAIN WITH RIGHT-SIDED SCIATICA: Primary | ICD-10-CM

## 2020-11-30 PROCEDURE — 99213 OFFICE O/P EST LOW 20 MIN: CPT | Performed by: FAMILY MEDICINE

## 2020-11-30 RX ORDER — PREDNISONE 20 MG/1
TABLET ORAL
Qty: 18 TABLET | Refills: 0 | Status: SHIPPED | OUTPATIENT
Start: 2020-11-30 | End: 2021-01-04 | Stop reason: CLARIF

## 2020-11-30 RX ORDER — CYCLOBENZAPRINE HCL 10 MG
10 TABLET ORAL NIGHTLY
Qty: 10 TABLET | Refills: 0 | Status: SHIPPED | OUTPATIENT
Start: 2020-11-30 | End: 2020-12-10

## 2020-11-30 NOTE — PROGRESS NOTES
Telemedicine Visit    Nikki Rayo  verbally consents to a Telemedicine service on 11/30/2020 . Patient understands and accepts financial responsibility for any deductible, co-insurance and/or co-pays associated with this service.       Duration of the s UTIs due to persistent urethral strictures      History reviewed. No pertinent surgical history. History reviewed. No pertinent family history.    Social History    Tobacco Use      Smoking status: Never Smoker      Smokeless tobacco: Former User    Alcoh Emergency Use Authorization (EUA) for use by authorized  laboratories. Due to the current public health emergency, [facility] is receiving a high volume of samples from  a wide variety of swabs and media for COVID-19 testing.   In order to ser CFU/ML Streptococcus agalactiae (Group B beta strep)*  Final    Comment: Beta Hemolytic Strep are considered universally susceptible to ampicillin, penicillin and vancomycin. Susceptibilty testing will not be performed on these antibiotics.   Strep Group B legs, bowel or bladder control changes, accelerating pain, fever, nausea and vomitting. If any of those symptoms develop, please call immediately. We will add Flexeril. He should only take it at bedtime. Continue the prednisone.   If no improvement over was an emergency. The patient was also advised of the potential privacy & security concerns related to the telehealth platform.    The patient was made aware of where to find [facility] notice of privacy practices, telehealth consent form and other related cons

## 2020-12-01 ENCOUNTER — TELEPHONE (OUTPATIENT)
Dept: FAMILY MEDICINE CLINIC | Facility: CLINIC | Age: 50
End: 2020-12-01

## 2020-12-01 ENCOUNTER — OFFICE VISIT (OUTPATIENT)
Dept: FAMILY MEDICINE CLINIC | Facility: CLINIC | Age: 50
End: 2020-12-01
Payer: COMMERCIAL

## 2020-12-01 ENCOUNTER — PATIENT MESSAGE (OUTPATIENT)
Dept: FAMILY MEDICINE CLINIC | Facility: CLINIC | Age: 50
End: 2020-12-01

## 2020-12-01 VITALS
OXYGEN SATURATION: 97 % | BODY MASS INDEX: 40.92 KG/M2 | WEIGHT: 302.13 LBS | HEIGHT: 72 IN | DIASTOLIC BLOOD PRESSURE: 81 MMHG | TEMPERATURE: 98 F | SYSTOLIC BLOOD PRESSURE: 152 MMHG | HEART RATE: 88 BPM

## 2020-12-01 DIAGNOSIS — M54.41 ACUTE RIGHT-SIDED LOW BACK PAIN WITH RIGHT-SIDED SCIATICA: Primary | ICD-10-CM

## 2020-12-01 PROCEDURE — 3008F BODY MASS INDEX DOCD: CPT | Performed by: FAMILY MEDICINE

## 2020-12-01 PROCEDURE — 3079F DIAST BP 80-89 MM HG: CPT | Performed by: FAMILY MEDICINE

## 2020-12-01 PROCEDURE — 3077F SYST BP >= 140 MM HG: CPT | Performed by: FAMILY MEDICINE

## 2020-12-01 PROCEDURE — 99213 OFFICE O/P EST LOW 20 MIN: CPT | Performed by: FAMILY MEDICINE

## 2020-12-01 RX ORDER — CYCLOBENZAPRINE HCL 10 MG
10 TABLET ORAL 3 TIMES DAILY
Qty: 45 TABLET | Refills: 0 | Status: SHIPPED | OUTPATIENT
Start: 2020-12-01 | End: 2020-12-16

## 2020-12-01 NOTE — TELEPHONE ENCOUNTER
Patient has been taking the muscle relaxer but nothing is helping. Still in a lot of pain.  What does Dr Yanet Parks suggest?

## 2020-12-01 NOTE — PROGRESS NOTES
Aj Villavicencio is a 52year old male.   Patient presents with:  Low Back Pain: lower back pain-started last week-was able to go to work-unable to move on thanksgiving morning-pt went to [facility] on 11/6-evisit with dr Estefani Martinez yesterday and was give : 288 lb 6 oz (130.8 kg)  08/23/19 : 267 lb 6 oz (121.3 kg)  06/28/18 : (!) 310 lb (140.6 kg)      REVIEW OF SYSTEMS:   GENERAL HEALTH: feels poor see HPI  NEURO no bladder blowel issues  EXTREM see HPI        Objective   EXAM:   /81   Pulse 88   Tem

## 2020-12-01 NOTE — TELEPHONE ENCOUNTER
Future Appointments   Date Time Provider Shabbir Aldana   12/1/2020  3:30 PM DO XOCHITL PerkinsSW [facility]

## 2020-12-01 NOTE — TELEPHONE ENCOUNTER
From: Janes Betts  To: Doris Cody DO  Sent: 12/1/2020 5:35 AM CST  Subject: Prescription Question    Good morning I took the muscle relaxer last night at 9pm.I don't think it's working when I tried to get up at 2am to use the bathroom the pain a

## 2020-12-01 NOTE — TELEPHONE ENCOUNTER
[facility]. Pt viewed response.     Pt will need to schedule a follow-up appointment per Dr. Yasmine Blue request.

## 2020-12-14 ENCOUNTER — TELEPHONE (OUTPATIENT)
Dept: FAMILY MEDICINE CLINIC | Facility: CLINIC | Age: 50
End: 2020-12-14

## 2020-12-14 ENCOUNTER — OFFICE VISIT (OUTPATIENT)
Dept: FAMILY MEDICINE CLINIC | Facility: CLINIC | Age: 50
End: 2020-12-14
Payer: COMMERCIAL

## 2020-12-14 VITALS
DIASTOLIC BLOOD PRESSURE: 80 MMHG | HEART RATE: 97 BPM | HEIGHT: 72 IN | WEIGHT: 302 LBS | RESPIRATION RATE: 24 BRPM | TEMPERATURE: 97 F | SYSTOLIC BLOOD PRESSURE: 129 MMHG | BODY MASS INDEX: 40.9 KG/M2

## 2020-12-14 DIAGNOSIS — M54.41 ACUTE RIGHT-SIDED LOW BACK PAIN WITH RIGHT-SIDED SCIATICA: Primary | ICD-10-CM

## 2020-12-14 PROCEDURE — 3008F BODY MASS INDEX DOCD: CPT | Performed by: FAMILY MEDICINE

## 2020-12-14 PROCEDURE — 99213 OFFICE O/P EST LOW 20 MIN: CPT | Performed by: FAMILY MEDICINE

## 2020-12-14 PROCEDURE — 3079F DIAST BP 80-89 MM HG: CPT | Performed by: FAMILY MEDICINE

## 2020-12-14 PROCEDURE — 3074F SYST BP LT 130 MM HG: CPT | Performed by: FAMILY MEDICINE

## 2020-12-14 RX ORDER — PREDNISONE 20 MG/1
TABLET ORAL
Qty: 30 TABLET | Refills: 0 | Status: SHIPPED | OUTPATIENT
Start: 2020-12-14 | End: 2021-01-04 | Stop reason: CLARIF

## 2020-12-14 RX ORDER — HYDROCODONE BITARTRATE AND ACETAMINOPHEN 5; 325 MG/1; MG/1
1 TABLET ORAL EVERY 8 HOURS PRN
Qty: 21 TABLET | Refills: 0 | Status: SHIPPED | OUTPATIENT
Start: 2020-12-14 | End: 2020-12-21

## 2020-12-14 NOTE — PROGRESS NOTES
Lyubov Rene is a 52year old male. Patient presents with:  Back Pain: inrm.  6      Chief Complaint Reviewed and Verified  Nursing Notes Reviewed and   Verified  Tobacco Reviewed  Allergies Reviewed  Medications Reviewed    Problem List Reviewed  Medica sensation  The leg       Objective   EXAM:   /80 (BP Location: Right arm, Patient Position: Sitting, Cuff Size: large)   Pulse 97   Temp 96.7 °F (35.9 °C) (Temporal)   Resp 24   Ht 6' (1.829 m)   Wt (!) 302 lb (137 kg)   BMI 40.96 kg/m²  Body mass in

## 2020-12-14 NOTE — TELEPHONE ENCOUNTER
Patient was seen by Dr Glenna Hernandez today. He was going to prescribe a pain medication and a steroid for the patient. Spouse is at the Pharmacy now to pick it up and they have no new script request for this medication. Spouse is going to wait at the pharmacy.  P

## 2020-12-15 ENCOUNTER — TELEPHONE (OUTPATIENT)
Dept: FAMILY MEDICINE CLINIC | Facility: CLINIC | Age: 50
End: 2020-12-15

## 2020-12-15 DIAGNOSIS — M54.16 LUMBAR BACK PAIN WITH RADICULOPATHY AFFECTING RIGHT LOWER EXTREMITY: Primary | ICD-10-CM

## 2020-12-15 NOTE — TELEPHONE ENCOUNTER
NEEDS AN UPDATE ON THE MRI ORDER    FAX [phone]     SHE SAID DR MEJIA IS THE DR THAT PLACED THE ORDER

## 2020-12-15 NOTE — TELEPHONE ENCOUNTER
He does not meet the usual criteria--not longer than 6 weeks not related to direct trauma or metastatic cancer or failed physical therapy--  I ordered through [facility]--they will review and call patient if approved and schedule      \"  Your provider has re

## 2020-12-21 ENCOUNTER — TELEPHONE (OUTPATIENT)
Dept: FAMILY MEDICINE CLINIC | Facility: CLINIC | Age: 50
End: 2020-12-21

## 2020-12-21 DIAGNOSIS — M54.16 LUMBAR BACK PAIN WITH RADICULOPATHY AFFECTING RIGHT LOWER EXTREMITY: Primary | ICD-10-CM

## 2020-12-21 RX ORDER — PREDNISONE 20 MG/1
TABLET ORAL
Qty: 30 TABLET | Refills: 0 | Status: SHIPPED | OUTPATIENT
Start: 2020-12-21 | End: 2021-01-04 | Stop reason: CLARIF

## 2020-12-21 NOTE — TELEPHONE ENCOUNTER
LOOKS LIKE INS WILL NOT COVER MRI DR ORDERED, CAN HE GET REFERRAL TO ORTHO OR WHATEVER NEXT STEP WOULD HAVE BEEN?

## 2020-12-21 NOTE — TELEPHONE ENCOUNTER
Patients wife called and stated is there any way to get more steroids for patient until his appt with Dr. Fawn Kaur? He does really well when on the 3 pills but then has struggles to get out of bed when decreased to 2 or 1 pills.  Do you want to see him yasir

## 2020-12-22 ENCOUNTER — OFFICE VISIT (OUTPATIENT)
Dept: SURGERY | Facility: CLINIC | Age: 50
End: 2020-12-22
Payer: COMMERCIAL

## 2020-12-22 VITALS
OXYGEN SATURATION: 97 % | RESPIRATION RATE: 18 BRPM | SYSTOLIC BLOOD PRESSURE: 140 MMHG | DIASTOLIC BLOOD PRESSURE: 90 MMHG | HEART RATE: 107 BPM

## 2020-12-22 DIAGNOSIS — R29.898 WEAKNESS OF LEFT FOOT: ICD-10-CM

## 2020-12-22 DIAGNOSIS — M51.16 LUMBAR DISC HERNIATION WITH RADICULOPATHY: Primary | ICD-10-CM

## 2020-12-22 DIAGNOSIS — R20.0 LEFT LEG NUMBNESS: ICD-10-CM

## 2020-12-22 PROCEDURE — 3080F DIAST BP >= 90 MM HG: CPT | Performed by: PHYSICIAN ASSISTANT

## 2020-12-22 PROCEDURE — 99214 OFFICE O/P EST MOD 30 MIN: CPT | Performed by: PHYSICIAN ASSISTANT

## 2020-12-22 PROCEDURE — 3077F SYST BP >= 140 MM HG: CPT | Performed by: PHYSICIAN ASSISTANT

## 2020-12-22 RX ORDER — GABAPENTIN 300 MG/1
300 CAPSULE ORAL 3 TIMES DAILY
Qty: 90 CAPSULE | Refills: 1 | Status: ON HOLD | OUTPATIENT
Start: 2020-12-22 | End: 2021-01-07

## 2020-12-22 RX ORDER — HYDROCODONE BITARTRATE AND ACETAMINOPHEN 10; 325 MG/1; MG/1
1 TABLET ORAL EVERY 4 HOURS PRN
Qty: 60 TABLET | Refills: 0 | Status: ON HOLD | OUTPATIENT
Start: 2020-12-22 | End: 2021-01-07

## 2020-12-22 NOTE — PROGRESS NOTES
Left leg and back, started a month ago  No injury, just progressive  Went to ER around Gaurav, given shot and steroids. States that the only time he can move is when he takes the steroids, just not sure how long he can do this.     Pain 7/10, goes to

## 2020-12-22 NOTE — PATIENT INSTRUCTIONS
Refill policies:    • Allow 2-3 business days for refills; controlled substances may take longer.   • Contact your pharmacy at least 5 days prior to running out of medication and have them send an electronic request or submit request through the “request re Depending on your insurance carrier, approval may take 3-10 days. It is highly recommended patients contact their insurance carrier directly to determine coverage.   If test is done without insurance authorization, patient may be responsible for the entire numbness  Probable left L5-S1 disc herniation    PLAN:  MRI LS  Continue prednisone  Norco 10 mg tablets  Gabapentin 300 mg 3 times daily  Referral to pain clinic most likely he will need a left L5-S1 epidural  Telemetry visit with me next Monday  Appointm

## 2020-12-22 NOTE — PROGRESS NOTES
[facility] [place] Neurosurgery Consultation      HISTORY OF PRESENT ILLNESS:Luis Alberto Menjivar is a 52year old RH male here for spinal evaluation.   Gives a history of being in [place] deer hunting and working on his home where he developed back pain and left le never smoked. He quit smokeless tobacco use about 6 years ago. He reports previous alcohol use. He reports that he does not use drugs. ALLERGIES:  No Known Allergies    REVIEW OF SYSTEMS:  A 10-point system was reviewed.   Pertinent positives and negativ visit with me next Monday  Appointment with Dr. Dawn Briceño    Patient's questions were answered.         Maye Ayala M.S., PA-C  [place] [id number] [facility], [address] [place] [address] Nara Azevedo, [address] [place] [address]  [phone]

## 2020-12-24 ENCOUNTER — TELEPHONE (OUTPATIENT)
Dept: SURGERY | Facility: CLINIC | Age: 50
End: 2020-12-24

## 2020-12-24 RX ORDER — CYCLOBENZAPRINE HCL 10 MG
10 TABLET ORAL 3 TIMES DAILY PRN
Qty: 60 TABLET | Refills: 1 | Status: ON HOLD | OUTPATIENT
Start: 2020-12-24 | End: 2021-01-07

## 2020-12-24 NOTE — TELEPHONE ENCOUNTER
Pt questioning if he can increase the gabapentin or take more throughout the day, it isn't helping. Pls advise.

## 2020-12-24 NOTE — TELEPHONE ENCOUNTER
Patient called to gabapentin he is taken 300 3 times daily seems to be helping with some of his nerve pain but not the spasms and cramping. He can increase the gabapentin up to 600 mg 3 times a day.     He was given Flexeril that was sent to his pharmacy f

## 2021-01-04 ENCOUNTER — HOSPITAL ENCOUNTER (INPATIENT)
Facility: HOSPITAL | Age: 51
LOS: 3 days | Discharge: HOME HEALTH | DRG: 095 | End: 2021-01-07
Attending: EMERGENCY MEDICINE | Admitting: INTERNAL MEDICINE
Payer: COMMERCIAL

## 2021-01-04 ENCOUNTER — TELEPHONE (OUTPATIENT)
Dept: SURGERY | Facility: CLINIC | Age: 51
End: 2021-01-04

## 2021-01-04 ENCOUNTER — HOSPITAL ENCOUNTER (OUTPATIENT)
Dept: MRI IMAGING | Facility: HOSPITAL | Age: 51
Discharge: HOME | End: 2021-01-04
Attending: PHYSICIAN ASSISTANT
Payer: COMMERCIAL

## 2021-01-04 ENCOUNTER — APPOINTMENT (OUTPATIENT)
Dept: CT IMAGING | Facility: HOSPITAL | Age: 51
DRG: 095 | End: 2021-01-04
Attending: EMERGENCY MEDICINE
Payer: COMMERCIAL

## 2021-01-04 ENCOUNTER — APPOINTMENT (OUTPATIENT)
Dept: MRI IMAGING | Facility: HOSPITAL | Age: 51
DRG: 095 | End: 2021-01-04
Attending: INTERNAL MEDICINE
Payer: COMMERCIAL

## 2021-01-04 DIAGNOSIS — R20.0 LEFT LEG NUMBNESS: ICD-10-CM

## 2021-01-04 DIAGNOSIS — M51.16 LUMBAR DISC HERNIATION WITH RADICULOPATHY: ICD-10-CM

## 2021-01-04 DIAGNOSIS — M46.46 DISCITIS OF LUMBAR REGION: ICD-10-CM

## 2021-01-04 DIAGNOSIS — G06.2 EPIDURAL ABSCESS: Primary | ICD-10-CM

## 2021-01-04 DIAGNOSIS — R29.898 WEAKNESS OF LEFT FOOT: ICD-10-CM

## 2021-01-04 DIAGNOSIS — M54.16 LUMBAR BACK PAIN WITH RADICULOPATHY AFFECTING RIGHT LOWER EXTREMITY: Primary | ICD-10-CM

## 2021-01-04 LAB
ALBUMIN SERPL-MCNC: 3.6 G/DL (ref 3.4–5)
ALBUMIN/GLOB SERPL: 0.8 {RATIO} (ref 1–2)
ALP LIVER SERPL-CCNC: 77 U/L
ALT SERPL-CCNC: 29 U/L
ANION GAP SERPL CALC-SCNC: 5 MMOL/L (ref 0–18)
AST SERPL-CCNC: 9 U/L (ref 15–37)
BASOPHILS # BLD AUTO: 0.06 X10(3) UL (ref 0–0.2)
BASOPHILS NFR BLD AUTO: 0.7 %
BILIRUB SERPL-MCNC: 0.3 MG/DL (ref 0.1–2)
BILIRUB UR QL STRIP.AUTO: NEGATIVE
BUN BLD-MCNC: 19 MG/DL (ref 7–18)
BUN/CREAT SERPL: 20.7 (ref 10–20)
CALCIUM BLD-MCNC: 9.6 MG/DL (ref 8.5–10.1)
CHLORIDE SERPL-SCNC: 107 MMOL/L (ref 98–112)
CO2 SERPL-SCNC: 24 MMOL/L (ref 21–32)
COLOR UR AUTO: YELLOW
CREAT BLD-MCNC: 0.92 MG/DL
CRP SERPL-MCNC: 3.54 MG/DL (ref ?–0.3)
DEPRECATED RDW RBC AUTO: 46.2 FL (ref 35.1–46.3)
EOSINOPHIL # BLD AUTO: 0.07 X10(3) UL (ref 0–0.7)
EOSINOPHIL NFR BLD AUTO: 0.8 %
ERYTHROCYTE [DISTWIDTH] IN BLOOD BY AUTOMATED COUNT: 13.5 % (ref 11–15)
GLOBULIN PLAS-MCNC: 4.5 G/DL (ref 2.8–4.4)
GLUCOSE BLD-MCNC: 157 MG/DL (ref 70–99)
GLUCOSE UR STRIP.AUTO-MCNC: 50 MG/DL
HCT VFR BLD AUTO: 44.1 %
HGB BLD-MCNC: 13.9 G/DL
IMM GRANULOCYTES # BLD AUTO: 0.06 X10(3) UL (ref 0–1)
IMM GRANULOCYTES NFR BLD: 0.7 %
INR BLD: 0.94 (ref 0.89–1.11)
KETONES UR STRIP.AUTO-MCNC: NEGATIVE MG/DL
LACTATE SERPL-SCNC: 1.5 MMOL/L (ref 0.4–2)
LACTATE SERPL-SCNC: 2.2 MMOL/L (ref 0.4–2)
LYMPHOCYTES # BLD AUTO: 1.83 X10(3) UL (ref 1–4)
LYMPHOCYTES NFR BLD AUTO: 21.2 %
M PROTEIN MFR SERPL ELPH: 8.1 G/DL (ref 6.4–8.2)
MCH RBC QN AUTO: 29.1 PG (ref 26–34)
MCHC RBC AUTO-ENTMCNC: 31.5 G/DL (ref 31–37)
MCV RBC AUTO: 92.5 FL
MONOCYTES # BLD AUTO: 0.6 X10(3) UL (ref 0.1–1)
MONOCYTES NFR BLD AUTO: 6.9 %
NEUTROPHILS # BLD AUTO: 6.03 X10 (3) UL (ref 1.5–7.7)
NEUTROPHILS # BLD AUTO: 6.03 X10(3) UL (ref 1.5–7.7)
NEUTROPHILS NFR BLD AUTO: 69.7 %
NITRITE UR QL STRIP.AUTO: NEGATIVE
OSMOLALITY SERPL CALC.SUM OF ELEC: 288 MOSM/KG (ref 275–295)
PH UR STRIP.AUTO: 5 [PH] (ref 4.5–8)
PLATELET # BLD AUTO: 263 10(3)UL (ref 150–450)
POTASSIUM SERPL-SCNC: 4 MMOL/L (ref 3.5–5.1)
PROT UR STRIP.AUTO-MCNC: NEGATIVE MG/DL
PSA SERPL DL<=0.01 NG/ML-MCNC: 12.9 SECONDS (ref 12.4–14.6)
RBC # BLD AUTO: 4.77 X10(6)UL
RBC UR QL AUTO: NEGATIVE
SARS-COV-2 RNA RESP QL NAA+PROBE: NOT DETECTED
SODIUM SERPL-SCNC: 136 MMOL/L (ref 136–145)
SP GR UR STRIP.AUTO: 1.01 (ref 1–1.03)
UROBILINOGEN UR STRIP.AUTO-MCNC: <2 MG/DL
WBC # BLD AUTO: 8.7 X10(3) UL (ref 4–11)

## 2021-01-04 PROCEDURE — 99223 1ST HOSP IP/OBS HIGH 75: CPT | Performed by: INTERNAL MEDICINE

## 2021-01-04 PROCEDURE — A9575 INJ GADOTERATE MEGLUMI 0.1ML: HCPCS

## 2021-01-04 PROCEDURE — 72149 MRI LUMBAR SPINE W/DYE: CPT | Performed by: INTERNAL MEDICINE

## 2021-01-04 PROCEDURE — 99222 1ST HOSP IP/OBS MODERATE 55: CPT | Performed by: NURSE PRACTITIONER

## 2021-01-04 PROCEDURE — 72148 MRI LUMBAR SPINE W/O DYE: CPT | Performed by: PHYSICIAN ASSISTANT

## 2021-01-04 RX ORDER — HYDROCODONE BITARTRATE AND ACETAMINOPHEN 10; 325 MG/1; MG/1
1 TABLET ORAL EVERY 4 HOURS PRN
Status: DISCONTINUED | OUTPATIENT
Start: 2021-01-04 | End: 2021-01-05

## 2021-01-04 RX ORDER — ACETAMINOPHEN 325 MG/1
650 TABLET ORAL EVERY 4 HOURS PRN
Status: DISCONTINUED | OUTPATIENT
Start: 2021-01-04 | End: 2021-01-07

## 2021-01-04 RX ORDER — HYDROMORPHONE HYDROCHLORIDE 1 MG/ML
0.2 INJECTION, SOLUTION INTRAMUSCULAR; INTRAVENOUS; SUBCUTANEOUS EVERY 2 HOUR PRN
Status: DISCONTINUED | OUTPATIENT
Start: 2021-01-04 | End: 2021-01-07

## 2021-01-04 RX ORDER — GABAPENTIN 300 MG/1
600 CAPSULE ORAL 3 TIMES DAILY
Status: DISCONTINUED | OUTPATIENT
Start: 2021-01-04 | End: 2021-01-07

## 2021-01-04 RX ORDER — HYDROMORPHONE HYDROCHLORIDE 1 MG/ML
0.8 INJECTION, SOLUTION INTRAMUSCULAR; INTRAVENOUS; SUBCUTANEOUS EVERY 2 HOUR PRN
Status: DISCONTINUED | OUTPATIENT
Start: 2021-01-04 | End: 2021-01-07

## 2021-01-04 RX ORDER — HYDROCODONE BITARTRATE AND ACETAMINOPHEN 5; 325 MG/1; MG/1
1 TABLET ORAL EVERY 4 HOURS PRN
Status: DISCONTINUED | OUTPATIENT
Start: 2021-01-04 | End: 2021-01-05

## 2021-01-04 RX ORDER — HYDROCODONE BITARTRATE AND ACETAMINOPHEN 5; 325 MG/1; MG/1
2 TABLET ORAL EVERY 4 HOURS PRN
Status: DISCONTINUED | OUTPATIENT
Start: 2021-01-04 | End: 2021-01-05

## 2021-01-04 RX ORDER — HYDROMORPHONE HYDROCHLORIDE 1 MG/ML
0.5 INJECTION, SOLUTION INTRAMUSCULAR; INTRAVENOUS; SUBCUTANEOUS EVERY 30 MIN PRN
Status: COMPLETED | OUTPATIENT
Start: 2021-01-04 | End: 2021-01-05

## 2021-01-04 RX ORDER — CHOLECALCIFEROL (VITAMIN D3) 125 MCG
5 CAPSULE ORAL NIGHTLY PRN
COMMUNITY

## 2021-01-04 RX ORDER — HYDROMORPHONE HYDROCHLORIDE 1 MG/ML
0.4 INJECTION, SOLUTION INTRAMUSCULAR; INTRAVENOUS; SUBCUTANEOUS EVERY 2 HOUR PRN
Status: DISCONTINUED | OUTPATIENT
Start: 2021-01-04 | End: 2021-01-07

## 2021-01-04 RX ORDER — VANCOMYCIN 2 GRAM/500 ML IN 0.9 % SODIUM CHLORIDE INTRAVENOUS
25 ONCE
Status: COMPLETED | OUTPATIENT
Start: 2021-01-04 | End: 2021-01-04

## 2021-01-04 RX ORDER — SODIUM CHLORIDE 9 MG/ML
INJECTION, SOLUTION INTRAVENOUS CONTINUOUS
Status: CANCELLED | OUTPATIENT
Start: 2021-01-04 | End: 2021-01-04

## 2021-01-04 RX ORDER — CYCLOBENZAPRINE HCL 10 MG
10 TABLET ORAL 3 TIMES DAILY PRN
Status: DISCONTINUED | OUTPATIENT
Start: 2021-01-04 | End: 2021-01-07

## 2021-01-04 RX ORDER — ONDANSETRON 2 MG/ML
4 INJECTION INTRAMUSCULAR; INTRAVENOUS EVERY 6 HOURS PRN
Status: DISCONTINUED | OUTPATIENT
Start: 2021-01-04 | End: 2021-01-07

## 2021-01-04 NOTE — H&P
ANISH HOSPITALIST                                                               History & Physical         Alphonsus Randalia Patient Status:  Inpatient    1970 MRN [id number]   [facility] 3SW-A Attending Nena Liao MD   [facility] Day # 0 P Father    • Other (Other) Father       Reviewed    Social history:   reports that he has never smoked. He quit smokeless tobacco use about 6 years ago. He reports previous alcohol use. He reports that he does not use drugs.     Allergies:  No Known Allergie erythema. Psychiatric: Appropriate mood and affect.       Diagnostic Data:      Laboratory Data:   Lab Results   Component Value Date    WBC 8.7 01/04/2021    HGB 13.9 01/04/2021    HCT 44.1 01/04/2021    .0 01/04/2021    CREATSERUM 0.92 01/04/2021 There is mild surrounding paraspinal soft tissue edema at the L4 and L5 levels.      Nonspecific diffuse muscle edema is seen in the posterior paraspinal musculature extending towards the facet joints bilaterally that could be due to muscle strain, with non small superimposed right paracentral broad-based disc protrusion. Mild to moderate facet arthropathy, right greater than left. There is no significant spinal canal or neural foraminal   stenosis.                      =====  CONCLUSION:       1.  There is discitis/osteomyelitis with possible epidural abscess with mild lumbar canal canal stenosis at L4-L5 and moderate bilateral neural foraminal stenosis at L3-L4 and paraspinal muscle strain spasm  1. IV pain medications as needed  2.  Neurosurgery  consulted

## 2021-01-04 NOTE — TELEPHONE ENCOUNTER
Dr. Jay Engel on call this morning, he received call from radiology this morning indicating concern on MRI for possible discitis/osteomyelitis. Discussed with Dr. Maritza Sprague, patient is scheduled to see him later this month.      Dr. Maritza Sprague recommends the p

## 2021-01-04 NOTE — PLAN OF CARE
RECD FROM ER PER STRETCHER, ALERT ,AWAKE, ORIENTED ACC BY WIFE. C/O LOW BACK PAIN DOWN TO LLE. C/O SLIGHT NUMBNESS TO LLE. GAIT STEADY. CMS GOOD. BP ELEVATED EVEN IN ER. DENIES HNT.  PT STATES\" I HAVE PAIN TO MY LOW BACK \" MD WAS PAGED FOR PAIN  MEDS ORDER

## 2021-01-04 NOTE — ED PROVIDER NOTES
Patient Seen in: [facility] Emergency Department      History   Patient presents with:  Abnormal Result    Stated Complaint: possible discitis/osteomyletis of lumbar spine, had MRI today, needs admit, blo*    HPI/Subjective:   HPI    52year-old mal negative except as noted above.     Physical Exam     ED Triage Vitals   BP 01/04/21 1245 (!) 163/108   Pulse 01/04/21 1240 116   Resp 01/04/21 1240 20   Temp 01/04/21 1240 99.8 °F (37.7 °C)   Temp src 01/04/21 1240 Temporal   SpO2 01/04/21 1240 96 %   O2 D 1/4/2021  PROCEDURE:  MRI SPINE LUMBAR (CPT=72148)  COMPARISON:  None.   INDICATIONS:  R20.0 Left leg numbness R29.898 Weakness of left foot M51.16 Lumbar disc herniation with radiculopathy  TECHNIQUE:  Multiplanar T1 and T2 weighted images including fat vang distal spinal cord and conus medullaris have a normal signal and morphology. The conus medullaris terminates at the upper L1 level. The roots of the cauda equina are unremarkable. T12-L1: There is no significant abnormality.   L1-2:  Minimal diffuse dis reference measurement of approximately 3.0 x 3.0 x 1.1 cm that could represent a combination of epidural abscess/phlegmon, disc extrusion, and venous engorgement. There is mild surrounding paraspinal soft tissue edema at the L4 and L5 levels.   2. Nonspeci further care at this time. MDM      As above  Admission disposition: 1/4/2021  2:20 PM             Critical Care Note:  The patient arrived with a condition with significant morbidity and mortality associated.  The services I provided  were to [facility]

## 2021-01-04 NOTE — CONSULTS
INFECTIOUS DISEASE [address] Patient Status:  Inpatient    1970 MRN [id number]   [facility] 3SW-A Attending Paula Jerome MD   [facility] Day # 0 PCP Jayme Madrid, HYDROmorphone HCl (DILAUDID) 1 MG/ML injection 0.2 mg, 0.2 mg, Intravenous, Q2H PRN **OR** HYDROmorphone HCl (DILAUDID) 1 MG/ML injection 0.4 mg, 0.4 mg, Intravenous, Q2H PRN **OR** HYDROmorphone HCl (DILAUDID) 1 MG/ML injection 0.8 mg, 0.8 mg, Intravenous 157*   BUN 19*   CREATSERUM 0.92   GFRAA 113   GFRNAA 98   CA 9.6   ALB 3.6      K 4.0      CO2 24.0   ALKPHO 77   AST 9*   ALT 29   BILT 0.3   TP 8.1                  Established Problem list:  Patient Active Problem List:     Cervical radicul

## 2021-01-04 NOTE — PROGRESS NOTES
Pharmacy note re: Zosyn dose adjustment for Obesity:    The dose of Zosyn was changed from 3.375 gm to 4.5 gm based on Patient's wt > 100 kg. Actual wt= 136.1 kg. Thank you.     Caty Fernández, PharmD  [phone]

## 2021-01-04 NOTE — ED INITIAL ASSESSMENT (HPI)
Pt sent by Mauricio Sheridan NP for possible discitis/osetomyletits of lumbar spine for blood work and imaging.

## 2021-01-04 NOTE — CONSULTS
[facility]  Neurosurgery Consult    Charlie Irmazeenat Patient Status:  Emergency    1970 MRN [id number]   Location [facility] [facility] Attending Helga Aguilar MD   Hosp Day # 0 PCP Priyanka Max, [address] [place] [address] He reports that he does not use drugs.     ALLERGIES:  No Known Allergies    MEDICATIONS:  (Not in a hospital admission)      •  sodium chloride 0.9% IV bolus 1,000 mL, 1,000 mL, Intravenous, Once    •  HYDROmorphone HCl (DILAUDID) 1 MG/ML injection 0.5 mg, tissue edema at the L4 and L5 levels.      Nonspecific diffuse muscle edema is seen in the posterior paraspinal musculature extending towards the facet joints bilaterally that could be due to muscle strain, with nonspecific myositis or other etiologies not broad-based disc protrusion. Mild to moderate facet arthropathy, right greater than left. There is no significant spinal canal or neural foraminal   stenosis.                      =====  CONCLUSION:       1.  There is fluid signal in the intervertebral di further evaluation      [facility]  1/4/2021, 1:16 PM

## 2021-01-05 ENCOUNTER — APPOINTMENT (OUTPATIENT)
Dept: CT IMAGING | Facility: HOSPITAL | Age: 51
DRG: 095 | End: 2021-01-05
Attending: INTERNAL MEDICINE
Payer: COMMERCIAL

## 2021-01-05 LAB
ANION GAP SERPL CALC-SCNC: 8 MMOL/L (ref 0–18)
BASOPHILS # BLD AUTO: 0.04 X10(3) UL (ref 0–0.2)
BASOPHILS NFR BLD AUTO: 0.5 %
BUN BLD-MCNC: 18 MG/DL (ref 7–18)
BUN/CREAT SERPL: 20.5 (ref 10–20)
CALCIUM BLD-MCNC: 9.2 MG/DL (ref 8.5–10.1)
CHLORIDE SERPL-SCNC: 105 MMOL/L (ref 98–112)
CO2 SERPL-SCNC: 27 MMOL/L (ref 21–32)
CREAT BLD-MCNC: 0.88 MG/DL
DEPRECATED RDW RBC AUTO: 45.5 FL (ref 35.1–46.3)
EOSINOPHIL # BLD AUTO: 0.09 X10(3) UL (ref 0–0.7)
EOSINOPHIL NFR BLD AUTO: 1.2 %
ERYTHROCYTE [DISTWIDTH] IN BLOOD BY AUTOMATED COUNT: 13.7 % (ref 11–15)
GLUCOSE BLD-MCNC: 98 MG/DL (ref 70–99)
HCT VFR BLD AUTO: 40.7 %
HGB BLD-MCNC: 12.7 G/DL
IMM GRANULOCYTES # BLD AUTO: 0.05 X10(3) UL (ref 0–1)
IMM GRANULOCYTES NFR BLD: 0.7 %
LYMPHOCYTES # BLD AUTO: 2.38 X10(3) UL (ref 1–4)
LYMPHOCYTES NFR BLD AUTO: 32.4 %
MCH RBC QN AUTO: 28.2 PG (ref 26–34)
MCHC RBC AUTO-ENTMCNC: 31.2 G/DL (ref 31–37)
MCV RBC AUTO: 90.4 FL
MONOCYTES # BLD AUTO: 0.61 X10(3) UL (ref 0.1–1)
MONOCYTES NFR BLD AUTO: 8.3 %
NEUTROPHILS # BLD AUTO: 4.18 X10 (3) UL (ref 1.5–7.7)
NEUTROPHILS # BLD AUTO: 4.18 X10(3) UL (ref 1.5–7.7)
NEUTROPHILS NFR BLD AUTO: 56.9 %
OSMOLALITY SERPL CALC.SUM OF ELEC: 292 MOSM/KG (ref 275–295)
PLATELET # BLD AUTO: 256 10(3)UL (ref 150–450)
POTASSIUM SERPL-SCNC: 3.7 MMOL/L (ref 3.5–5.1)
RBC # BLD AUTO: 4.5 X10(6)UL
SODIUM SERPL-SCNC: 140 MMOL/L (ref 136–145)
WBC # BLD AUTO: 7.4 X10(3) UL (ref 4–11)

## 2021-01-05 PROCEDURE — 77012 CT SCAN FOR NEEDLE BIOPSY: CPT | Performed by: INTERNAL MEDICINE

## 2021-01-05 PROCEDURE — 0SB23ZX EXCISION OF LUMBAR VERTEBRAL DISC, PERCUTANEOUS APPROACH, DIAGNOSTIC: ICD-10-PCS | Performed by: RADIOLOGY

## 2021-01-05 PROCEDURE — 62267 INTERDISCAL PERQ ASPIR DX: CPT | Performed by: INTERNAL MEDICINE

## 2021-01-05 PROCEDURE — 99253 IP/OBS CNSLTJ NEW/EST LOW 45: CPT | Performed by: UROLOGY

## 2021-01-05 PROCEDURE — 99152 MOD SED SAME PHYS/QHP 5/>YRS: CPT | Performed by: INTERNAL MEDICINE

## 2021-01-05 PROCEDURE — 009U3ZX DRAINAGE OF SPINAL CANAL, PERCUTANEOUS APPROACH, DIAGNOSTIC: ICD-10-PCS | Performed by: RADIOLOGY

## 2021-01-05 PROCEDURE — 99231 SBSQ HOSP IP/OBS SF/LOW 25: CPT | Performed by: NEUROLOGICAL SURGERY

## 2021-01-05 PROCEDURE — 99232 SBSQ HOSP IP/OBS MODERATE 35: CPT | Performed by: INTERNAL MEDICINE

## 2021-01-05 PROCEDURE — 99153 MOD SED SAME PHYS/QHP EA: CPT | Performed by: INTERNAL MEDICINE

## 2021-01-05 RX ORDER — FLUMAZENIL 0.1 MG/ML
0.2 INJECTION, SOLUTION INTRAVENOUS AS NEEDED
Status: DISCONTINUED | OUTPATIENT
Start: 2021-01-05 | End: 2021-01-05 | Stop reason: HOSPADM

## 2021-01-05 RX ORDER — SODIUM CHLORIDE 9 MG/ML
INJECTION, SOLUTION INTRAVENOUS CONTINUOUS
Status: DISCONTINUED | OUTPATIENT
Start: 2021-01-05 | End: 2021-01-05 | Stop reason: HOSPADM

## 2021-01-05 RX ORDER — NALOXONE HYDROCHLORIDE 0.4 MG/ML
80 INJECTION, SOLUTION INTRAMUSCULAR; INTRAVENOUS; SUBCUTANEOUS AS NEEDED
Status: DISCONTINUED | OUTPATIENT
Start: 2021-01-05 | End: 2021-01-05 | Stop reason: HOSPADM

## 2021-01-05 RX ORDER — HYDROCODONE BITARTRATE AND ACETAMINOPHEN 10; 325 MG/1; MG/1
1 TABLET ORAL EVERY 4 HOURS PRN
Status: DISCONTINUED | OUTPATIENT
Start: 2021-01-05 | End: 2021-01-07

## 2021-01-05 RX ORDER — MIDAZOLAM HYDROCHLORIDE 1 MG/ML
1 INJECTION INTRAMUSCULAR; INTRAVENOUS EVERY 5 MIN PRN
Status: DISCONTINUED | OUTPATIENT
Start: 2021-01-05 | End: 2021-01-05 | Stop reason: HOSPADM

## 2021-01-05 RX ORDER — HYDROCODONE BITARTRATE AND ACETAMINOPHEN 10; 325 MG/1; MG/1
2 TABLET ORAL EVERY 4 HOURS PRN
Status: DISCONTINUED | OUTPATIENT
Start: 2021-01-05 | End: 2021-01-07

## 2021-01-05 RX ORDER — MIDAZOLAM HYDROCHLORIDE 1 MG/ML
INJECTION INTRAMUSCULAR; INTRAVENOUS
Status: COMPLETED
Start: 2021-01-05 | End: 2021-01-05

## 2021-01-05 RX ORDER — POTASSIUM CHLORIDE 20 MEQ/1
40 TABLET, EXTENDED RELEASE ORAL ONCE
Status: COMPLETED | OUTPATIENT
Start: 2021-01-05 | End: 2021-01-05

## 2021-01-05 NOTE — PAYOR COMM NOTE
--------------  ADMISSION REVIEW     Payor: [facility]  Subscriber #:  [id number]  Authorization Number: [id number]    Admit date: 1/4/21  Admit time: 1548       Admitting Physician: Aryan Lynch MD  Attending Physician:  Aryan Lynch MD  Primary Care Patient underwent a partial urethrectomy/urethroplasty in 2007 by Dr. Sunny Andrade at [address] [place] [address] following multiple episodes of urinary retention and UTIs due to persistent urethral strictures              History reviewed.  No pertinent surgical h LACTIC ACID, PLASMA - Abnormal; Notable for the following components:    Lactic Acid 2.2 (*)     All other components within normal limits   COMP METABOLIC PANEL (14) - Abnormal; Notable for the following components:    Glucose 157 (*)     BUN 19 (*)     B PROCEDURE:  MRI SPINE LUMBAR (CPT=72148)  COMPARISON:  None.   INDICATIONS:  R20.0 Left leg numbness R29.898 Weakness of left foot M51.16 Lumbar disc herniation with radiculopathy  TECHNIQUE:  Multiplanar T1 and T2 weighted images including fat suppression spinal cord and conus medullaris have a normal signal and morphology. The conus medullaris terminates at the upper L1 level. The roots of the cauda equina are unremarkable. T12-L1: There is no significant abnormality.   L1-2:  Minimal diffuse disc bulge CONCLUSION:   1. There is fluid signal in the intervertebral disc space at L4-5. There is moderate marrow edema and endplate irregularity involving the L4 and L5 vertebral bodies. The overall findings are concerning for discitis/osteomyelitis.   Clinical vancomycin IVPB premix 2g in 0.9% NaCl 500 mL (has no administration in time range)   sodium chloride 0.9% IV bolus 1,000 mL (1,000 mL Intravenous New Bag 1/4/21 1258)     Patient had blood cultures sent.   He was given IV antibiotics for his epidural absce Lyubov Rene Patient Status:  Inpatient    1970 MRN [id number]   [facility] 3SW-A Attending Shahzad Webber MD   [facility] Day # 0 PCP Adelso Ovalles DO     Chief Complaint: Intractable low back pain    History of Present Illness:   Lisa Alvarez reports that he has never smoked. He reports that he does not use drugs. Allergies:  No Known Allergies    Home Medications:    •  Melatonin 5 MG Oral Tab, Take 5 mg by mouth nightly as needed. , Disp: , Rfl: , Past Week at Unknown time    •  cyclobenza HGB 13.9 01/04/2021    HCT 44.1 01/04/2021    .0 01/04/2021    CREATSERUM 0.92 01/04/2021    BUN 19 01/04/2021     01/04/2021    K 4.0 01/04/2021     01/04/2021    CO2 24.0 01/04/2021     01/04/2021    CA 9.6 01/04/2021    ALB 3. Nonspecific diffuse muscle edema is seen in the posterior paraspinal musculature extending towards the facet joints bilaterally that could be due to muscle strain, with nonspecific myositis or other etiologies not entirely excluded.   Clinical correlation L5-S1:  Mild diffuse disc bulge with endplate osteophytes and a small superimposed right paracentral broad-based disc protrusion. Mild to moderate facet arthropathy, right greater than left.   There is no significant spinal canal or neural foraminal   sten ASSESSMENT / PLAN:     1.  Intractable low back pain due to lumbar L4-5 discitis/osteomyelitis with possible epidural abscess with mild lumbar canal canal stenosis at L4-L5 and moderate bilateral neural foraminal stenosis at L3-L4 and paraspinal muscle stra HYDROmorphone HCl (DILAUDID) 1 MG/ML injection 0.5 mg     Date Action Dose Route User    1/5/2021 0616 Given 0.5 mg Intravenous Jenn Fillers, RN    1/4/2021 2136 Given 0.5 mg Intravenous Jenn Fillers, RN    1/4/2021 1728 Given 0.5 mg Intravenous Mate Aj Villavicencio is a(n) 52year old male w/ a PMH of urethral stricture s/p partial urethrectomy/plasty with recurrent UTIs who originally saw Tee [place] on 12/22 w/ c/o left sided low back pain with radiation into the L leg which began mid [place] •  Piperacillin Sod-Tazobactam So (ZOSYN) 4.5 g in dextrose 5 % 100 mL MBP/ADD-vantage, 4.5 g, Intravenous, Once           REVIEW OF SYSTEMS:  A 10-point system was reviewed.   Pertinent positives and negatives are noted in HPI.       PHYSICAL EXAMINATION: Nonspecific diffuse muscle edema is seen in the posterior paraspinal musculature extending towards the facet joints bilaterally that could be due to muscle strain, with nonspecific myositis or other etiologies not entirely excluded.  Clinical correlation L5-S1:  Mild diffuse disc bulge with endplate osteophytes and a small superimposed right paracentral broad-based disc protrusion.  Mild to moderate facet arthropathy, right greater than left.  There is no significant spinal canal or neural foraminal   sten Pt planned for drainage of abscess and culture  Consult ID for antibiotic recommendations  MRI lumbar w/ contrast for further evaluation        [facility]  1/4/2021, 1:16 PM             Electronically signed by Madiha Marks Medications:     Current Facility-Administered Medications:   •  HYDROmorphone HCl (DILAUDID) 1 MG/ML injection 0.5 mg, 0.5 mg, Intravenous, Q30 Min PRN  •  cyclobenzaprine (FLEXERIL) tab 10 mg, 10 mg, Oral, TID PRN  •  gabapentin (NEURONTIN) cap 600 mg, 6 Vital signs: Temp:  [98.5 °F (36.9 °C)-99.8 °F (37.7 °C)] 98.5 °F (36.9 °C)  Pulse:  [] 96  Resp:  [20] 20  BP: (147-163)/(101-113) 148/101  HEENT: Moist mucous membranes. Extraocular muscles are intact.   Neck: No swelling, no masses  Respiratory: No [facility]     Report of Consultation     Pablo[address]valdo Anthonyzainab Patel [address]  [phone]               Melanie Nicole Patient Status:  Inpatient    1970 MRN [id number]   [facility] 3SW-A Attending Samantha Heart MD   [facility] Day # 1 PCP Dorothy Mcclain reports that he has never smoked.   He reports that he does not use drugs.     Allergies:  No Known Allergies     Medications:     Current Facility-Administered Medications:   •  HYDROcodone-acetaminophen (NORCO)  MG per tab 1 tablet, 1 tablet, Oral, Genitalia:   Phallus is circumcised. There is a needle urethral meatus at the level of the penoscrotal junction ventrally which appears stenotic.   Testes are descended bilaterally.         Extremities:   Extremities normal   Skin:   Skin color, texture, t Voided urine culture will probably show contaminant organism. Patient declines any urethral manipulations at this time and states he will begin self dilating when he returns home.   I told the patient we will follow him while in the hospital and if he has

## 2021-01-05 NOTE — PLAN OF CARE
MRI DEPT AWARE PT  NEED MRI WITH CONTRAST. ENDORSED NEXT SHIFT SCREENING DONE. NPO AFTER MN FOR CT BX NO TIME YET FOR SHOAIB. TO CALL CT FOR FOLLOW UP TIME.

## 2021-01-05 NOTE — PLAN OF CARE
A&O x4. VSS on RA. 's over 80's. Reports lower back pain radiating to left leg. Pain managed with Norco PRN. Dilaudid given for breakthrough pain. Reports numbness to left leg. Up to bathroom SBA. Voiding without difficulty.  MRI lumbar spine complete

## 2021-01-05 NOTE — PROCEDURES
[facility]  Procedure Note    Lelon Sleeper Patient Status:  Inpatient    1970 MRN [id number]   [facility] 3SW-A Attending Kenton Evans MD   [facility] Day # 1 PCP Kristel Dominguez DO     Procedure: CT guided biopsy and aspiration o

## 2021-01-05 NOTE — IMAGING NOTE
Received pt to CT1. Pt verified name and  as well as allergies. Pt states NPO since 2300 last night. Pt states he does not take blood thinners. Lab results of PT/INR and PLT reviewed. Informed consent obtained by Dr. Severo Hanna.   Pt positioned prone on

## 2021-01-05 NOTE — PROGRESS NOTES
[facility]                INFECTIOUS DISEASE PROGRESS NOTE    Shad Carver Patient Status:  Inpatient    1970 MRN [id number]   [facility] 3SW-A Attending Kimani Galeana MD   [facility] Day # 1 PCP DO Vicente Hicks Collection Time: 01/04/21  1:05 PM    Specimen: Urine, clean catch   Result Value Ref Range    Urine Culture  N/A     <10,000 cfu/ml Mixture of Gram positive organisms isolated - probable contamination.    2. BLOOD CULTURE     Status: None (Preliminary resu

## 2021-01-05 NOTE — PLAN OF CARE
Patient rating pain 6 for pain in back that radiates down left leg and headache. C/O feeling dehydrated, Dr. Sha Chambers aware and patient started on IV fluids 0.9 NS at 83 cc hour.  Patient scheduled for a CT guided biopsy  today at 1 pm. Patient's gait is slow

## 2021-01-05 NOTE — PROGRESS NOTES
[facility]  Neurosurgery Progress Note    Chiquita Guo Patient Status:  Inpatient    1970 MRN [id number]   [facility] 3SW-A Attending Em Gatica MD   [address] Day # 1 PCP Wendel Alpers, DO     Chief Complaint:  Discitis/OM posterior vertebral bodies from the midportion of L4, through the   mid-lower portion of L5, centered at the abnormal L4-L5 discitis and osteomyelitis.   There is no fluid within this thickened tissue to indicate a fluid containing abscess, but this soft ti weakness   crp elevated  rec ir biopsy  Id following  following

## 2021-01-05 NOTE — PROGRESS NOTES
[facility]  Progress Note    Lelon Sleeper Patient Status:  Inpatient    1970 MRN [id number]   [facility] 3SW-A Attending Kenton Evans MD   [facility] Day # 1 PCP Kristel Dominguez DO     CC: Intractable low back pain    SUBJECTIVE: 01/05/2021    HGB 12.7 01/05/2021    HCT 40.7 01/05/2021    .0 01/05/2021    CREATSERUM 0.88 01/05/2021    BUN 18 01/05/2021     01/05/2021    K 3.7 01/05/2021     01/05/2021    CO2 27.0 01/05/2021    GLU 98 01/05/2021    CA 9.2 01/05/20 1/4/2021 shows enhancing soft tissue posterior to the spine anterior to the thecal sac entered abnormal L4-L5 disc space which showed osteomyelitis and discitis on previous MRI.   No fluid pocket is seen within this enhancing soft tissue and could reflect a

## 2021-01-06 LAB — POTASSIUM SERPL-SCNC: 3.8 MMOL/L (ref 3.5–5.1)

## 2021-01-06 PROCEDURE — 99231 SBSQ HOSP IP/OBS SF/LOW 25: CPT | Performed by: NEUROLOGICAL SURGERY

## 2021-01-06 PROCEDURE — 99232 SBSQ HOSP IP/OBS MODERATE 35: CPT | Performed by: HOSPITALIST

## 2021-01-06 PROCEDURE — 99231 SBSQ HOSP IP/OBS SF/LOW 25: CPT | Performed by: UROLOGY

## 2021-01-06 RX ORDER — VANCOMYCIN HYDROCHLORIDE
15 EVERY 12 HOURS
Status: DISCONTINUED | OUTPATIENT
Start: 2021-01-07 | End: 2021-01-07

## 2021-01-06 RX ORDER — CEFAZOLIN SODIUM/WATER 2 G/20 ML
2 SYRINGE (ML) INTRAVENOUS EVERY 8 HOURS
Status: DISCONTINUED | OUTPATIENT
Start: 2021-01-06 | End: 2021-01-06

## 2021-01-06 RX ORDER — VANCOMYCIN 2 GRAM/500 ML IN 0.9 % SODIUM CHLORIDE INTRAVENOUS
25 ONCE
Status: COMPLETED | OUTPATIENT
Start: 2021-01-06 | End: 2021-01-06

## 2021-01-06 RX ORDER — POTASSIUM CHLORIDE 20 MEQ/1
40 TABLET, EXTENDED RELEASE ORAL ONCE
Status: COMPLETED | OUTPATIENT
Start: 2021-01-06 | End: 2021-01-06

## 2021-01-06 NOTE — PROGRESS NOTES
Culture growing Staph aureus. Blood cultures on admission were no growth. Started on IV Vancomycin pending susceptibilties. PICC line to be placed 1/7.  Advised patient that even with IVABX the process in the spine could worsen and could develop an abscess

## 2021-01-06 NOTE — PROGRESS NOTES
[facility]                INFECTIOUS DISEASE PROGRESS NOTE    Shad Carver Patient Status:  Inpatient    1970 MRN [id number]   [facility] 3SW-A Attending Kimani Galeana MD   [facility] Day # 2 PCP DO Vicente Hicks 107 105  --    CO2 24.0 27.0  --    ALKPHO 77  --   --    AST 9*  --   --    ALT 29  --   --    BILT 0.3  --   --    TP 8.1  --   --        No results found for: [facility] Encounter on 01/04/21   1.  ANAEROBIC CULTURE     Status: None

## 2021-01-06 NOTE — PROGRESS NOTES
[facility]  Neurosurgery Progress Note    Newell Medicus Patient Status:  Inpatient    1970 MRN [id number]   [facility] 3SW-A Attending Wil Sandoval MD   [facility] Day # 2 PCP Rey Ball DO     Chief Complaint:  Discitis/OM

## 2021-01-06 NOTE — PROGRESS NOTES
Patient continues to void comfortably. He feels better today no headache. Had aspiration of epidural fluid collection yesterday and is currently under treatment.   He stated Dr. Linda Park asked him to obtain a urethrogram and he wonders know if that can be d

## 2021-01-06 NOTE — PLAN OF CARE
A&O x4. VSS on RA. SCD's on bilaterally. Numbness to left leg unchanged. Pt states he can move lower extremities more. Reports lower back pain radiating to left leg. Pain managed with Norco PRN. Melatonin given before bed. Up to bathroom SBA.  Denies diffic

## 2021-01-06 NOTE — PROGRESS NOTES
[facility]  Progress Note    Casanrda Marroquin Patient Status:  Inpatient    1970 MRN [id number]   [facility] 3SW-A Attending Gen Sousa MD   [address] Day # 2 PCP Marizol Almaguer DO     CC: Intractable low back pain    SUBJECTIVE: mg, 600 mg, Oral, TID    •  melatonin cap/tab 5 mg, 5 mg, Oral, Nightly PRN    •  ondansetron HCl (ZOFRAN) injection 4 mg, 4 mg, Intravenous, Q6H PRN    •  acetaminophen (TYLENOL) tab 650 mg, 650 mg, Oral, Q4H PRN    •  HYDROmorphone HCl (DILAUDID) 1 MG/ML discharge: To be decided  Discharge is dependent on: Clinical progress, ID and neurosurgery recommendations  At this point Mr. Emilia Fair  is expected to be discharge to:  To be decided      Questions/concerns and Plan of care were discussed with patient and RN,

## 2021-01-06 NOTE — PAYOR COMM NOTE
--------------  CONTINUED STAY REVIEW    Payor: [facility]  Subscriber #:  [id number]  Authorization Number: [id number]    Admit date: 1/4/21  Admit time: 1548    Admitting Physician: Anahy Cobos MD  Attending Physician:  Ladi Booker MD  Primary Car 1/6/2021 0915 Given 40 mEq Oral Can Owens RN      0.9% NaCl infusion     Date Action Dose Route User    1/5/2021 1324 New Bag (none) Intravenous Mary Dickens RN            Plan: 1/5   Lavenia Barthel, MD   Physician   Neurosurgery   Aleksander Limited contrast only MRI imaging performed to supplement a dedicated multiplanar multi-echo noncontrast MRI examination performed earlier today showing numerous abnormalities including discitis and osteomyelitis, please see that report for complete   deta    Plan:   Will discuss with Dr. Jt Lemus  No acute surgical intervention  Plan for IR drainage today  ID on consult for antibiotic recommendations  Pain medications as ordered        [facility]  1/5/2021, 9:24 AM [facility]                 INFECTIOUS DISEASE PROGRESS NOTE           Thais Prior Patient Status:  Inpatient    1970 MRN [id number]   [facility] 3SW-A Attending Kemar Ryan MD   [facility] Day # Recent Results          Hospital Encounter on 01/04/21   1.  URINE CULTURE, ROUTINE     Status: None     Collection Time: 01/04/21  1:05 PM     Specimen: Urine, clean catch   Result Value Ref Range     Urine Culture   N/A       <10,000 cfu/ml Mixture of Gra Patient continues to void comfortably. He feels better today no headache. Had aspiration of epidural fluid collection yesterday and is currently under treatment.   He stated Dr. Erik Washburn asked him to obtain a urethrogram and he wonders know if that can be d

## 2021-01-06 NOTE — PLAN OF CARE
Pt ambulates with SBA. States pain minimal.  Flexeril given for back spasms with relief. Numbness to left leg continues. Seen by spine this am.  Will stay to wait for culture results. Pt verbalized understanding of POC and fall precautions.   Will con

## 2021-01-07 VITALS
SYSTOLIC BLOOD PRESSURE: 133 MMHG | HEART RATE: 94 BPM | TEMPERATURE: 99 F | DIASTOLIC BLOOD PRESSURE: 79 MMHG | WEIGHT: 300 LBS | RESPIRATION RATE: 14 BRPM | OXYGEN SATURATION: 93 % | BODY MASS INDEX: 41 KG/M2

## 2021-01-07 LAB
CREAT BLD-MCNC: 1.04 MG/DL
POTASSIUM SERPL-SCNC: 4.1 MMOL/L (ref 3.5–5.1)

## 2021-01-07 PROCEDURE — 99231 SBSQ HOSP IP/OBS SF/LOW 25: CPT | Performed by: NEUROLOGICAL SURGERY

## 2021-01-07 PROCEDURE — 99239 HOSP IP/OBS DSCHRG MGMT >30: CPT | Performed by: HOSPITALIST

## 2021-01-07 PROCEDURE — 3E04329 INTRODUCTION OF OTHER ANTI-INFECTIVE INTO CENTRAL VEIN, PERCUTANEOUS APPROACH: ICD-10-PCS | Performed by: HOSPITALIST

## 2021-01-07 PROCEDURE — 02HV33Z INSERTION OF INFUSION DEVICE INTO SUPERIOR VENA CAVA, PERCUTANEOUS APPROACH: ICD-10-PCS | Performed by: HOSPITALIST

## 2021-01-07 RX ORDER — VANCOMYCIN 2 GRAM/500 ML IN 0.9 % SODIUM CHLORIDE INTRAVENOUS
2 EVERY 12 HOURS
Qty: 42000 ML | Refills: 0 | Status: SHIPPED | OUTPATIENT
Start: 2021-01-08 | End: 2021-02-19

## 2021-01-07 RX ORDER — HYDROCODONE BITARTRATE AND ACETAMINOPHEN 10; 325 MG/1; MG/1
1 TABLET ORAL EVERY 4 HOURS PRN
Qty: 60 TABLET | Refills: 0 | Status: SHIPPED | OUTPATIENT
Start: 2021-01-07 | End: 2021-01-28

## 2021-01-07 RX ORDER — CYCLOBENZAPRINE HCL 10 MG
10 TABLET ORAL 3 TIMES DAILY PRN
Qty: 60 TABLET | Refills: 1 | Status: SHIPPED | OUTPATIENT
Start: 2021-01-07 | End: 2021-03-29 | Stop reason: ALTCHOICE

## 2021-01-07 RX ORDER — VANCOMYCIN 2 GRAM/500 ML IN 0.9 % SODIUM CHLORIDE INTRAVENOUS
15 EVERY 12 HOURS
Status: DISCONTINUED | OUTPATIENT
Start: 2021-01-07 | End: 2021-01-07

## 2021-01-07 RX ORDER — GABAPENTIN 300 MG/1
600 CAPSULE ORAL 3 TIMES DAILY
Refills: 0 | Status: SHIPPED | COMMUNITY
Start: 2021-01-07 | End: 2021-03-29 | Stop reason: ALTCHOICE

## 2021-01-07 RX ORDER — LIDOCAINE HYDROCHLORIDE 10 MG/ML
5 INJECTION, SOLUTION EPIDURAL; INFILTRATION; INTRACAUDAL; PERINEURAL ONCE
Status: DISCONTINUED | OUTPATIENT
Start: 2021-01-07 | End: 2021-01-07

## 2021-01-07 RX ORDER — DOCUSATE SODIUM 100 MG/1
100 CAPSULE, LIQUID FILLED ORAL 2 TIMES DAILY
Qty: 60 CAPSULE | Refills: 0 | Status: SHIPPED | OUTPATIENT
Start: 2021-01-07 | End: 2021-03-29 | Stop reason: ALTCHOICE

## 2021-01-07 NOTE — PLAN OF CARE
Pt a/o x4. RA//IS. SCD/ankle pumps. Independent. VSS. Pain controlled with PO pain medication. IV antibiotics administered as ordered. Saline locked. Pt c/o of intermittent numbness to left leg. Voiding freely in bathroom. Last bowel movement 1/2 decline

## 2021-01-07 NOTE — CM/SW NOTE
01/07/21 1000   CM/SW Referral Data   Referral Source Physician   Reason for Referral Discharge planning   Informant Patient;Edward Staff   Patient Info   Patient's Mental Status Alert;Oriented   Patient's [address]   Patient lives with Spo

## 2021-01-07 NOTE — PROGRESS NOTES
[facility]                INFECTIOUS DISEASE PROGRESS NOTE    Jamil Ho Patient Status:  Inpatient    1970 MRN [id number]   [facility] 3SW-A Attending Alva Sheriff MD   [facility] Day # 3 PCP Bjorn AbreuerDO Kya ALB 3.6  --   --   --     140  --   --    K 4.0 3.7 3.8 4.1    105  --   --    CO2 24.0 27.0  --   --    ALKPHO 77  --   --   --    AST 9*  --   --   --    ALT 29  --   --   --    BILT 0.3  --   --   --    TP 8.1  --   --   --        No resul vancomycin  - will need IV abx on discharge x 6 weeks; PICC placed this am (CHERRY). Discussed with patient that he should continue to monitor closely for worsening back pain and fevers.  Explained that despite IV abx there is still a chance he could develop a

## 2021-01-07 NOTE — CM/SW NOTE
Spoke with Dee North from [facility] who stated they are still working on confirmation of insurance approval for infusion. They referred pt to Rere Luo and this is pending as well. Await confirmation of insurance approval and accepting [facility] provider.   Social w

## 2021-01-07 NOTE — PROGRESS NOTES
[facility]  Neurosurgery Progress Note    Mihai Coffman Patient Status:  Inpatient    1970 MRN [id number]   [facility] 3SW-A Attending Dolores Bean MD   [address] Day # 3 PCP Barbara Bonilla DO     Chief Complaint:  Discitis/OM

## 2021-01-07 NOTE — PROGRESS NOTES
[facility]                INFECTIOUS DISEASE PROGRESS NOTE    Geraldean Kawasaki Patient Status:  Inpatient    1970 MRN [id number]   [facility] 3SW-A Attending Axel Gray MD   [facility] Day # 3 PCP Priyanka Max DO     Vanc#1 0.3  --   --   --    TP 8.1  --   --   --        No results found for: [facility] Encounter on 01/04/21   1. ANAEROBIC CULTURE     Status: None (Preliminary result)    Collection Time: 01/05/21  2:00 PM    Specimen: Lumbar disc;  Tissue

## 2021-01-07 NOTE — CONSULTS
[facility] Dosing Service    Initial Pharmacokinetic Consult for Vancomycin Dosing     Aysha Grande is a 52year old patient who is being treated for osteomyelitis.   Pharmacy has been asked to dose Vancomycin by Rayray MARSHALL    Allergies:  Suzanna Rojo adjusted body weight of 101 kg and renal function. 2.  Vancomycin trough will be obtained prior to the 4th dose. Goal trough level 15-20 ug/mL. 3.  Will monitor SCr daily while on Vancomycin to assess renal function.     Pharmacy will continue to fol

## 2021-01-07 NOTE — PROGRESS NOTES
[facility]  Neurosurgery Progress Note  2021    Moisebrittnee NeilVic Patient Status:  Inpatient    1970 MRN [id number]   [facility] 3SW-A Attending Jazlyn Tanner MD   [facility] Day # 3 JOHANNA Sagastume DO     SUBJECTIVE:  Jeannette Heart

## 2021-01-07 NOTE — PROGRESS NOTES
[facility]  Progress Note    Katarzyna Kathleen Patient Status:  Inpatient    1970 MRN [id number]   [facility] 3SW-A Attending Jonatan Sparks MD   [facility] Day # 3 PCP Carlo Adkins DO     CC: Intractable low back pain    SUBJECTIVE: HYDROcodone-acetaminophen (NORCO)  MG per tab 2 tablet, 2 tablet, Oral, Q4H PRN    •  cyclobenzaprine (FLEXERIL) tab 10 mg, 10 mg, Oral, TID PRN    •  gabapentin (NEURONTIN) cap 600 mg, 600 mg, Oral, TID    •  melatonin cap/tab 5 mg, 5 mg, Oral, Nigh CT-guided lumbar spine biopsy/epidural abscess drainage  · CODE status: FULL  · Brannon: NO    Will the patient be referred to [facility] on discharge?: yes  Estimated date of discharge:  To be decided  Discharge is dependent on: Clinical progress, ID and neurosurge

## 2021-01-07 NOTE — PAYOR COMM NOTE
--------------  CONTINUED STAY REVIEW    Payor: [facility] PPO  Subscriber #:  [id number]  Authorization Number: [id number]    Admit date: 1/4/21  Admit time: 1548    Admitting Physician: Nena Liao MD  Attending Physician:  Geraldine Anglin MD  Primary Car Intravenous, Q12H     •  HYDROcodone-acetaminophen (NORCO)  MG per tab 1 tablet, 1 tablet, Oral, Q4H PRN    Or     •  HYDROcodone-acetaminophen (NORCO)  MG per tab 2 tablet, 2 tablet, Oral, Q4H PRN     •  cyclobenzaprine (FLEXERIL) tab 10 mg, 1 placed, to dc home when OP Abx arrangements made     Quality:  · DVT Prophylaxis: SCD.  No pharmacological prophylaxis as going for CT-guided lumbar spine biopsy/epidural abscess drainage  · CODE status: FULL  · Brannon: NO     Will the patient be referred t

## 2021-01-08 ENCOUNTER — TELEPHONE (OUTPATIENT)
Dept: FAMILY MEDICINE CLINIC | Facility: CLINIC | Age: 51
End: 2021-01-08

## 2021-01-08 ENCOUNTER — TELEPHONE (OUTPATIENT)
Dept: MEDSURG UNIT | Facility: HOSPITAL | Age: 51
End: 2021-01-08

## 2021-01-08 ENCOUNTER — PATIENT OUTREACH (OUTPATIENT)
Dept: CASE MANAGEMENT | Age: 51
End: 2021-01-08

## 2021-01-08 DIAGNOSIS — Z02.9 ENCOUNTERS FOR UNSPECIFIED ADMINISTRATIVE PURPOSE: ICD-10-CM

## 2021-01-08 NOTE — PROGRESS NOTES
Initial Post Discharge Follow Up   Discharge Date: 1/7/21  Contact Date: 1/8/2021    Consent Verification:  Assessment Completed With: Patient  HIPAA Verified?   Yes    Discharge Dx:     Lumbar discitis, osteomyelitis  History of urethral stricture    Wa Very well   o Very well  • Do you have any questions about your discharge instructions? No  • Before leaving the hospital was your diagnoses explained to you? Yes  • Do you have any questions about your diagnoses?  No  • Are you able to perform normal jarod medication as prescribed? No  Are you having any concerns with constipation? Yes; The patient has not yet started colace as prescribed. NCM did stress the importance of proper daily hydration, a high fiber diet, and activity as tolerated.      Referrals/ord there any reason as to why you cannot make your appointments? No     NCM Reviewed upcoming Specialist Appt with patient     Yes The patient is scheduled with Dr. Cruz January on 1/27/2021 and Dr. Juani Smith on 1/28/2021. Overall Rating:    How would you rate

## 2021-01-08 NOTE — DISCHARGE SUMMARY
[facility]  DISCHARGE SUMMARY     Mary Blevins Patient Status:  Inpatient    1970 MRN [id number]   [facility] 3SW-A Attending No att. providers found   Hosp Day # 3 PCP Rey Ball DO     Date of Admission: 2021  Da 34  59-90 High Risk  29-58 Medium Risk  0-28   Low Risk         TCM Follow-Up Recommendation:  LACE > 58:  High Risk of readmission after discharge from the hospital.    Procedures during hospitalization:   • CT guided lumbar abscess drainage and biopsy JIN Cartwright (RTE 34), [phone], [phone] [address] [facility] [address]    Phone: [phone]   · cyclobenzaprine 10 MG Tabs  · HYDROcodone-acetaminophen  MG Tabs     Please  your prescriptions

## 2021-01-08 NOTE — TELEPHONE ENCOUNTER
Wily Krause from Dr. Wanda Kaur office states they have not been able to find a home health agency that can do weekly lab draws and weekly PICC line dressing changes.      They would like to know if the pt can come to office for this, advised this office does not

## 2021-01-08 NOTE — TELEPHONE ENCOUNTER
Is it okay to suggest [facility] for PICC Line dressing changes? I called the agency and they service pt's in [place].

## 2021-01-08 NOTE — TELEPHONE ENCOUNTER
Karla Carrillo is calling she would like to speak with a nurse about Juana Landrum, [facility]

## 2021-01-08 NOTE — PLAN OF CARE
Written and verbal discharge plans given to patient and his wife. They verbalize understanding. Patient verbalizes understanding about follow-up for iv antibiotics tomorrow so he can be discharged today.  Reviewed PICC line care with patient and his wife, s

## 2021-01-08 NOTE — TELEPHONE ENCOUNTER
Delphine Welch RN  You 12 minutes ago (11:37 AM)     To the best of my knowledge we do not do this procedure in the office.  You can refer to this home health agency if ok with insurance and the Dr. Alexia Berger.

## 2021-01-08 NOTE — TELEPHONE ENCOUNTER
Spoke to the pt today for TCM to follow up on the recent hospitalization/care for lumbar osteomyelitis. The pt does not have HFU appt scheduled at this time. A TCM/HFU appt is recommended by 1/21/2021 as the pt is a moderate risk for readmission.   Please

## 2021-01-08 NOTE — PAYOR COMM NOTE
--------------  DISCHARGE REVIEW    Payor: [facility]  Subscriber #:  [id number]  Authorization Number: [id number]    Admit date: 1/4/21  Admit time:  8505  Discharge Date: 1/7/2021  6:15 PM     Admitting Physician: Aryan Lynch MD  Attending Physician:

## 2021-01-08 NOTE — TELEPHONE ENCOUNTER
Spoke to An Nayak from [facility], referred to [facility]    She will be contacting agency.

## 2021-01-11 ENCOUNTER — TELEPHONE (OUTPATIENT)
Dept: FAMILY MEDICINE CLINIC | Facility: CLINIC | Age: 51
End: 2021-01-11

## 2021-01-11 ENCOUNTER — TELEPHONE (OUTPATIENT)
Dept: HEMATOLOGY/ONCOLOGY | Facility: HOSPITAL | Age: 51
End: 2021-01-11

## 2021-01-11 DIAGNOSIS — Z20.822 EXPOSURE TO COVID-19 VIRUS: Primary | ICD-10-CM

## 2021-01-11 NOTE — TELEPHONE ENCOUNTER
Exposed to Kayleen.  Will now need a COVID test.     Future Appointments   Date Time Provider Shabbir Aldana   1/12/2021  9:15  [facility] Gilford Muller

## 2021-01-11 NOTE — TELEPHONE ENCOUNTER
COVID test has been ordered per Dr. Rosalina Tejada. Left detailed msg for spouse. Also sent her a [facility] msg advising recommendation. She will need to call central scheduling to schedule appts.

## 2021-01-11 NOTE — TELEPHONE ENCOUNTER
Called patient to set- up appointments and patient said that he is already set-up with home health for all of these. Called Dr. Rica Kulkarni and spoke with Royal Tinoco and was informed of this.

## 2021-01-12 ENCOUNTER — LAB ENCOUNTER (OUTPATIENT)
Dept: LAB | Age: 51
End: 2021-01-12
Attending: FAMILY MEDICINE
Payer: COMMERCIAL

## 2021-01-12 DIAGNOSIS — Z20.822 EXPOSURE TO COVID-19 VIRUS: ICD-10-CM

## 2021-01-14 LAB — SARS-COV-2 BY PCR: NOT DETECTED

## 2021-01-19 ENCOUNTER — TELEPHONE (OUTPATIENT)
Dept: SURGERY | Facility: CLINIC | Age: 51
End: 2021-01-19

## 2021-01-19 NOTE — TELEPHONE ENCOUNTER
New pt calling states seen MD in hospital states slow urine flow and today is little to no dripping.  Did tired to offer 3:30 appt pt declined  please advise

## 2021-01-20 NOTE — TELEPHONE ENCOUNTER
I called the pt again today. He stated that he was able to dilate his urethra yesterday and has since not had any problems with urination. He stated that he will be following up with his urologist Dr. Roxann Siegel and has already made an appt with him.

## 2021-01-27 ENCOUNTER — PATIENT OUTREACH (OUTPATIENT)
Dept: INFECTIOUS DISEASE | Facility: CLINIC | Age: 51
End: 2021-01-27

## 2021-01-27 NOTE — PROGRESS NOTES
Pain improved.  Outpatient labs sent to [facility] and [facility]  Component Name  1/26/2021 1/19/2021 1/12/2021 1/10/2021     20.43 (H) 43.57 (H) 82.53 (H) 100.85 (H)     C Reactive Protein 20.43 (H) 0.00 - 10.00 mg/L NM [address]     CRP trending down  [place]

## 2021-01-28 ENCOUNTER — OFFICE VISIT (OUTPATIENT)
Dept: SURGERY | Facility: CLINIC | Age: 51
End: 2021-01-28
Payer: COMMERCIAL

## 2021-01-28 VITALS
DIASTOLIC BLOOD PRESSURE: 70 MMHG | HEIGHT: 72 IN | BODY MASS INDEX: 40.63 KG/M2 | SYSTOLIC BLOOD PRESSURE: 126 MMHG | WEIGHT: 300 LBS

## 2021-01-28 DIAGNOSIS — M46.46 DISCITIS OF LUMBAR REGION: Primary | ICD-10-CM

## 2021-01-28 PROCEDURE — 99212 OFFICE O/P EST SF 10 MIN: CPT | Performed by: NEUROLOGICAL SURGERY

## 2021-01-28 PROCEDURE — 3008F BODY MASS INDEX DOCD: CPT | Performed by: NEUROLOGICAL SURGERY

## 2021-01-28 PROCEDURE — 3078F DIAST BP <80 MM HG: CPT | Performed by: NEUROLOGICAL SURGERY

## 2021-01-28 PROCEDURE — 3074F SYST BP LT 130 MM HG: CPT | Performed by: NEUROLOGICAL SURGERY

## 2021-01-28 RX ORDER — HYDROCODONE BITARTRATE AND ACETAMINOPHEN 10; 325 MG/1; MG/1
1 TABLET ORAL EVERY 4 HOURS PRN
Qty: 60 TABLET | Refills: 0 | Status: SHIPPED | OUTPATIENT
Start: 2021-01-28 | End: 2021-03-29 | Stop reason: ALTCHOICE

## 2021-01-28 NOTE — PROGRESS NOTES
Neurosurgery Clinic Visit  2021    Oli Fraire PCP:  DO EDIL House 1970 MRN [id number]     HISTORY OF PRESENT ILLNESS:  Oli Fraire is a(n) 52year old male here for follow-up regarding discitis/osteomyelitis  He is on vancomyc

## 2021-01-28 NOTE — PROGRESS NOTES
Pain is better than it was, still moving slow  Still taking gabapentin and cyclobenzaprine  Was given Oxy when left the hospital, hasn't had any since then.   This would help him on days when he moves around a lot    Has been on abx and things are better si

## 2021-02-10 ENCOUNTER — PATIENT OUTREACH (OUTPATIENT)
Dept: INFECTIOUS DISEASE | Facility: CLINIC | Age: 51
End: 2021-02-10

## 2021-02-10 NOTE — PROGRESS NOTES
Back pain improved  CRP elevated at 12.5mg/L = 1.25mg/dL, and is trending down  Vancomycin to continue 6-8 weeks  Follow CRP, consider MRI if CRP does not continue to trend down  EOT 3/3/2021    2/9/2021 2/2/2021 1/26/2021 1/19/2021 1/12/2021 1/10/2021

## 2021-02-15 RX ORDER — GABAPENTIN 600 MG/1
600 TABLET ORAL 3 TIMES DAILY
Qty: 90 TABLET | Refills: 1 | Status: SHIPPED | OUTPATIENT
Start: 2021-02-15 | End: 2021-03-29 | Stop reason: ALTCHOICE

## 2021-02-15 RX ORDER — GABAPENTIN 300 MG/1
600 CAPSULE ORAL 3 TIMES DAILY
Refills: 0 | Status: CANCELLED | OUTPATIENT
Start: 2021-02-15

## 2021-02-15 NOTE — TELEPHONE ENCOUNTER
Medication: Gabapentin    Date of last refill: 1/16/21  [place]      Last office visit: 1/28/21  Due back to clinic per last office note:  He will follow-up in 1 month  Date next office visit scheduled:  3/9/21    Last U

## 2021-02-15 NOTE — TELEPHONE ENCOUNTER
Patient was taking gabapentin 300 mg 2 tablets 3 times a day    It was changed to 600 mg tablets 1 tablet 3 times a day    My chart message sent to patient and a note was sent to the pharmacist

## 2021-02-24 ENCOUNTER — PATIENT OUTREACH (OUTPATIENT)
Dept: INFECTIOUS DISEASE | Facility: CLINIC | Age: 51
End: 2021-02-24

## 2021-02-24 NOTE — PROGRESS NOTES
CRP trended down, developed allergy to tagaderm on the arm; back pain improving.  Complete IV vancomycin today, can transition to PO clinda based on sensitivites for 2 more weeks

## 2021-03-09 ENCOUNTER — OFFICE VISIT (OUTPATIENT)
Dept: SURGERY | Facility: CLINIC | Age: 51
End: 2021-03-09
Payer: COMMERCIAL

## 2021-03-09 VITALS
WEIGHT: 300 LBS | SYSTOLIC BLOOD PRESSURE: 130 MMHG | HEIGHT: 72 IN | HEART RATE: 88 BPM | DIASTOLIC BLOOD PRESSURE: 90 MMHG | BODY MASS INDEX: 40.63 KG/M2

## 2021-03-09 DIAGNOSIS — M46.46 DISCITIS OF LUMBAR REGION: Primary | ICD-10-CM

## 2021-03-09 PROCEDURE — 3008F BODY MASS INDEX DOCD: CPT | Performed by: NEUROLOGICAL SURGERY

## 2021-03-09 PROCEDURE — 99213 OFFICE O/P EST LOW 20 MIN: CPT | Performed by: NEUROLOGICAL SURGERY

## 2021-03-09 PROCEDURE — 3080F DIAST BP >= 90 MM HG: CPT | Performed by: NEUROLOGICAL SURGERY

## 2021-03-09 PROCEDURE — 3075F SYST BP GE 130 - 139MM HG: CPT | Performed by: NEUROLOGICAL SURGERY

## 2021-03-09 RX ORDER — CLINDAMYCIN HYDROCHLORIDE 300 MG/1
300 CAPSULE ORAL 3 TIMES DAILY
COMMUNITY
Start: 2021-02-24 | End: 2021-03-29 | Stop reason: ALTCHOICE

## 2021-03-09 NOTE — PROGRESS NOTES
Neurosurgery Clinic Visit  3/9/2021    Florence Jose M PCP:  DO EDIL Greene 1970 MRN [id number]     HISTORY OF PRESENT ILLNESS:  Jonh Salguero is a(n) 52year old male here for follow-up regarding lumbar discitis and osteomyelitis  He is d

## 2021-03-09 NOTE — PROGRESS NOTES
Things are better  Still having Left lower back and left leg pain  But it's getting better  More discomfort  2/10 Pain

## 2021-03-18 ENCOUNTER — TELEPHONE (OUTPATIENT)
Dept: PHYSICAL THERAPY | Facility: HOSPITAL | Age: 51
End: 2021-03-18

## 2021-03-19 ENCOUNTER — OFFICE VISIT (OUTPATIENT)
Dept: PHYSICAL THERAPY | Age: 51
End: 2021-03-19
Attending: NEUROLOGICAL SURGERY
Payer: COMMERCIAL

## 2021-03-19 DIAGNOSIS — M46.46 DISCITIS OF LUMBAR REGION: ICD-10-CM

## 2021-03-19 PROCEDURE — 97161 PT EVAL LOW COMPLEX 20 MIN: CPT

## 2021-03-19 PROCEDURE — 97110 THERAPEUTIC EXERCISES: CPT

## 2021-03-19 NOTE — PROGRESS NOTES
SPINE EVALUATION:   Referring Physician: Dr. Mindi Billings  Diagnosis: Discitis of lumbar region (M46.46)     Date of Service: 3/19/2021     PATIENT SUMMARY   Chiquita Guo is a 52year old male who presents to therapy today with complaints of LE tightness, s decreased LE strength, decreased core strength and stability and reduced lumbar ROM.   Functional deficits include but are not limited to  heavy ADLs, lifting, carrying, walking several blocks, stair negotiation, usual work activities lower body dressing es ergonomics, pain science education , detrimental fear avoidance behaviors and importance of remaining active  Patient was instructed in and issued a HEP for: seated and supine hamstring stretch each LE 30 secs 3x 3x daily, Seated and supine piriformis stre return this letter via fax as soon as possible to [phone].  If you have any questions, please contact me at Dept: [phone]    Sincerely,  Electronically signed by therapist: Augie Grimes PT, DPT    Physician's certification required: Yes  I cer

## 2021-03-19 NOTE — PROGRESS NOTES
Dx:  Discitis of lumbar region (M46.46)             Insurance (Authorized # of Visits):  Medical Necessity           Authorizing Physician: Dr. Bula Gottron  Next MD visit: none scheduled  Fall Risk: standard         Precautions: n/a             Subjective: Pt flexor/quad  Contract relax 2 way piriformis and hamstring LLE 30 secs 3x   Lumbar rotations 15x2*       MANUAL:  STM effleurage and cross friction B lumbar paraspinals, L piriformis and L biceps femoris 15 mins        NEURO RE ED:  TrA activation 5 sec ho

## 2021-03-22 ENCOUNTER — OFFICE VISIT (OUTPATIENT)
Dept: PHYSICAL THERAPY | Age: 51
End: 2021-03-22
Attending: NEUROLOGICAL SURGERY
Payer: COMMERCIAL

## 2021-03-22 PROCEDURE — 97140 MANUAL THERAPY 1/> REGIONS: CPT

## 2021-03-22 PROCEDURE — 97112 NEUROMUSCULAR REEDUCATION: CPT

## 2021-03-22 PROCEDURE — 97110 THERAPEUTIC EXERCISES: CPT

## 2021-03-26 ENCOUNTER — OFFICE VISIT (OUTPATIENT)
Dept: PHYSICAL THERAPY | Age: 51
End: 2021-03-26
Attending: NEUROLOGICAL SURGERY
Payer: COMMERCIAL

## 2021-03-26 PROCEDURE — 97110 THERAPEUTIC EXERCISES: CPT

## 2021-03-26 PROCEDURE — 97140 MANUAL THERAPY 1/> REGIONS: CPT

## 2021-03-26 PROCEDURE — 97112 NEUROMUSCULAR REEDUCATION: CPT

## 2021-03-26 NOTE — PROGRESS NOTES
Dx:  Discitis of lumbar region (M46.46)             Insurance (Authorized # of Visits):  Medical Necessity           Authorizing Physician: Dr. Juani Smith  Next MD visit: none scheduled  Fall Risk: standard         Precautions: n/a             Subjective: Pt relax 2 way piriformis and hamstring LLE 30 secs 3x   Lumbar rotations 15x2* THERE EX:  Warm up Nu step level 3, 8 mins  Incline board 30 secs 3x B LE  Manual stretch 30 secs 3x each LE hip flexor/quad  Contract relax 2 way piriformis and hamstring LLE 30

## 2021-03-29 ENCOUNTER — APPOINTMENT (OUTPATIENT)
Dept: PHYSICAL THERAPY | Age: 51
End: 2021-03-29
Attending: NEUROLOGICAL SURGERY
Payer: COMMERCIAL

## 2021-03-29 ENCOUNTER — TELEPHONE (OUTPATIENT)
Dept: PHYSICAL THERAPY | Facility: HOSPITAL | Age: 51
End: 2021-03-29

## 2021-03-29 ENCOUNTER — LAB ENCOUNTER (OUTPATIENT)
Dept: LAB | Age: 51
End: 2021-03-29
Attending: FAMILY MEDICINE
Payer: COMMERCIAL

## 2021-03-29 ENCOUNTER — OFFICE VISIT (OUTPATIENT)
Dept: FAMILY MEDICINE CLINIC | Facility: CLINIC | Age: 51
End: 2021-03-29
Payer: COMMERCIAL

## 2021-03-29 VITALS
OXYGEN SATURATION: 98 % | HEART RATE: 88 BPM | WEIGHT: 310.38 LBS | SYSTOLIC BLOOD PRESSURE: 120 MMHG | TEMPERATURE: 98 F | DIASTOLIC BLOOD PRESSURE: 74 MMHG | BODY MASS INDEX: 42 KG/M2

## 2021-03-29 DIAGNOSIS — G47.09 OTHER INSOMNIA: ICD-10-CM

## 2021-03-29 DIAGNOSIS — R30.0 DYSURIA: Primary | ICD-10-CM

## 2021-03-29 DIAGNOSIS — G06.2 EPIDURAL ABSCESS: ICD-10-CM

## 2021-03-29 LAB
BASOPHILS # BLD AUTO: 0.06 X10(3) UL (ref 0–0.2)
BASOPHILS NFR BLD AUTO: 1.5 %
CRP SERPL-MCNC: 11.1 MG/DL (ref ?–0.3)
DEPRECATED RDW RBC AUTO: 49.4 FL (ref 35.1–46.3)
EOSINOPHIL # BLD AUTO: 0.07 X10(3) UL (ref 0–0.7)
EOSINOPHIL NFR BLD AUTO: 1.8 %
ERYTHROCYTE [DISTWIDTH] IN BLOOD BY AUTOMATED COUNT: 14.6 % (ref 11–15)
GLUCOSE (URINE DIPSTICK): NEGATIVE MG/DL
HCT VFR BLD AUTO: 45 %
HGB BLD-MCNC: 13.8 G/DL
IMM GRANULOCYTES # BLD AUTO: 0.02 X10(3) UL (ref 0–1)
IMM GRANULOCYTES NFR BLD: 0.5 %
LYMPHOCYTES # BLD AUTO: 0.91 X10(3) UL (ref 1–4)
LYMPHOCYTES NFR BLD AUTO: 22.9 %
MCH RBC QN AUTO: 28.2 PG (ref 26–34)
MCHC RBC AUTO-ENTMCNC: 30.7 G/DL (ref 31–37)
MCV RBC AUTO: 92 FL
MONOCYTES # BLD AUTO: 0.58 X10(3) UL (ref 0.1–1)
MONOCYTES NFR BLD AUTO: 14.6 %
MULTISTIX LOT#: 5077 NUMERIC
NEUTROPHILS # BLD AUTO: 2.34 X10 (3) UL (ref 1.5–7.7)
NEUTROPHILS # BLD AUTO: 2.34 X10(3) UL (ref 1.5–7.7)
NEUTROPHILS NFR BLD AUTO: 58.7 %
NITRITE, URINE: NEGATIVE
PH, URINE: 5.5 (ref 4.5–8)
PLATELET # BLD AUTO: 232 10(3)UL (ref 150–450)
PROTEIN (URINE DIPSTICK): 100 MG/DL
RBC # BLD AUTO: 4.89 X10(6)UL
SPECIFIC GRAVITY: 1.03 (ref 1–1.03)
URINE-COLOR: YELLOW
UROBILINOGEN,SEMI-QN: 0.2 MG/DL (ref 0–1.9)
WBC # BLD AUTO: 4 X10(3) UL (ref 4–11)

## 2021-03-29 PROCEDURE — 86140 C-REACTIVE PROTEIN: CPT | Performed by: FAMILY MEDICINE

## 2021-03-29 PROCEDURE — 81003 URINALYSIS AUTO W/O SCOPE: CPT | Performed by: FAMILY MEDICINE

## 2021-03-29 PROCEDURE — 3078F DIAST BP <80 MM HG: CPT | Performed by: FAMILY MEDICINE

## 2021-03-29 PROCEDURE — 3074F SYST BP LT 130 MM HG: CPT | Performed by: FAMILY MEDICINE

## 2021-03-29 PROCEDURE — 87086 URINE CULTURE/COLONY COUNT: CPT | Performed by: FAMILY MEDICINE

## 2021-03-29 PROCEDURE — 85025 COMPLETE CBC W/AUTO DIFF WBC: CPT | Performed by: FAMILY MEDICINE

## 2021-03-29 PROCEDURE — 99214 OFFICE O/P EST MOD 30 MIN: CPT | Performed by: FAMILY MEDICINE

## 2021-03-29 PROCEDURE — 36415 COLL VENOUS BLD VENIPUNCTURE: CPT | Performed by: FAMILY MEDICINE

## 2021-03-29 RX ORDER — NITROFURANTOIN 25; 75 MG/1; MG/1
100 CAPSULE ORAL 2 TIMES DAILY
Qty: 14 CAPSULE | Refills: 0 | Status: SHIPPED | OUTPATIENT
Start: 2021-03-29 | End: 2021-05-17 | Stop reason: ALTCHOICE

## 2021-03-29 NOTE — PROGRESS NOTES
Benedict Pollard is a 52year old male. Patient presents with:  UTI: chills, fever-started friday-has started macrobid that he had at home-feeling a little better but still off. ...room 1      HPI:   Patient has a history of recurrent urinary tract infection otherwise  SKIN: denies any unusual skin lesions or rashes  RESPIRATORY: denies shortness of breath   CARDIOVASCULAR: denies chest pain   GI: denies nausea, vomiting, diarrhea or abdominal pain   NEURO: denies headaches    EXAM:   /74   Pulse 88   Te Prescriptions     Signed Prescriptions Disp Refills   • Nitrofurantoin Monohyd Macro 100 MG Oral Cap 14 capsule 0     Sig: Take 1 capsule (100 mg total) by mouth 2 (two) times daily.        Imaging & Consults:  OP REFERRAL TO DIAGNOSTIC SLEEP STUDY

## 2021-04-01 NOTE — PROGRESS NOTES
Dx:  Discitis of lumbar region (M46.46)             Insurance (Authorized # of Visits):  Medical Necessity           Authorizing Physician: Dr. Mindi Billings  Next MD visit: none scheduled  Fall Risk: standard         Precautions: n/a             Subjective: Pt LLE 30 secs 3x   Lumbar rotations 15x2* THERE EX:  Warm up Nu step level 3, 8 mins  Incline board 30 secs 3x B LE  Manual stretch 30 secs 3x each LE hip flexor/quad  Contract relax 2 way piriformis and hamstring LLE 30 secs 3x   Lumbar rotations 15x2  Belem Terry

## 2021-04-02 ENCOUNTER — OFFICE VISIT (OUTPATIENT)
Dept: PHYSICAL THERAPY | Age: 51
End: 2021-04-02
Attending: NEUROLOGICAL SURGERY
Payer: COMMERCIAL

## 2021-04-02 PROCEDURE — 97110 THERAPEUTIC EXERCISES: CPT

## 2021-04-02 PROCEDURE — 97140 MANUAL THERAPY 1/> REGIONS: CPT

## 2021-04-06 ENCOUNTER — OFFICE VISIT (OUTPATIENT)
Dept: PHYSICAL THERAPY | Age: 51
End: 2021-04-06
Attending: NEUROLOGICAL SURGERY
Payer: COMMERCIAL

## 2021-04-06 PROCEDURE — 97140 MANUAL THERAPY 1/> REGIONS: CPT

## 2021-04-06 PROCEDURE — 97110 THERAPEUTIC EXERCISES: CPT

## 2021-04-06 NOTE — PROGRESS NOTES
Dx:  Discitis of lumbar region (M46.46)             Insurance (Authorized # of Visits):  Medical Necessity           Authorizing Physician: Dr. Bula Gottron  Next MD visit: none scheduled  Fall Risk: standard         Precautions: n/a             Subjective: Pt 3x B LE  Manual stretch 30 secs 3x each LE hip flexor/quad  Contract relax 2 way piriformis and hamstring LLE 30 secs 3x   Lumbar rotations 15x2* THERE EX:  Warm up Nu step level 3, 8 mins  Incline board 30 secs 3x B LE  Manual stretch 30 secs 3x each LE h 10x2 3 sec hold* NEURO RE ED:  Seated foam roll core iso push/pull, squish/twsit twist 10x2 3 sec hold NEURO RE ED:  Seated foam roll core iso push/pull, squish/twsit twist 10x2 3 sec hold           HEP: Denoted with * Has seated and supine piriformis and

## 2021-04-09 ENCOUNTER — OFFICE VISIT (OUTPATIENT)
Dept: PHYSICAL THERAPY | Age: 51
End: 2021-04-09
Attending: NEUROLOGICAL SURGERY
Payer: COMMERCIAL

## 2021-04-09 PROCEDURE — 97110 THERAPEUTIC EXERCISES: CPT

## 2021-04-09 PROCEDURE — 97140 MANUAL THERAPY 1/> REGIONS: CPT

## 2021-04-09 NOTE — PROGRESS NOTES
Dx:  Discitis of lumbar region (M46.46)             Insurance (Authorized # of Visits):  Medical Necessity           Authorizing Physician: Dr. Rika Curtis  Next MD visit: none scheduled  Fall Risk: standard         Precautions: n/a             Subjective: Pt lumbar ROM, core strength and stability. Add SB seated there ex, attempt rotation and hamstring strength, bridge on wall, Continue squat training.   Date: 3/22/2021  TX#: 2/10 Date: 3/26/21                TX#: 3/10 Date:  4/2/21                TX#: 4/10 Yusuf piriformis and L biceps femoris 15 mins  MANUAL:  STM effleurage and cross friction B lumbar paraspinals, L piriformis and L biceps femoris 15 mins   Prone PA L spine manual traction grade 3-4 5 mins MANUAL:  STM effleurage and cross friction B lumbar para

## 2021-04-13 ENCOUNTER — OFFICE VISIT (OUTPATIENT)
Dept: PHYSICAL THERAPY | Age: 51
End: 2021-04-13
Attending: NEUROLOGICAL SURGERY
Payer: COMMERCIAL

## 2021-04-13 PROCEDURE — 97140 MANUAL THERAPY 1/> REGIONS: CPT

## 2021-04-13 PROCEDURE — 97110 THERAPEUTIC EXERCISES: CPT

## 2021-04-13 NOTE — PROGRESS NOTES
Dx:  Discitis of lumbar region (M46.46)             Insurance (Authorized # of Visits):  1 Liv Lakhani Physician:  No ref.  provider found  Next MD visit: none scheduled  Fall Risk: standard         Precautions: n/a 4/9/21  Tx#: 6/10 Date: 4/13/21  TX#: 7/10    THERE EX:  Warm up Nu step level 5, 8 mins  Incline board 30 secs 3x B LE  Manual stretch 30 secs 3x each LE hip flexor/quad  Contract relax 2 way piriformis and hamstring LLE 30 secs 3x   Lumbar rotations 15x2 lumbar paraspinals, L piriformis and L biceps femoris 10 mins   Prone PA L spine manual traction grade 3-4 5 mins    NEURO RE ED:  Seated foam roll core iso push/pull, squish/twsit twist 10x2 3 sec hold NEURO RE ED:  Seated foam roll core iso push/pull, sq

## 2021-04-19 ENCOUNTER — APPOINTMENT (OUTPATIENT)
Dept: PHYSICAL THERAPY | Age: 51
End: 2021-04-19
Attending: NEUROLOGICAL SURGERY
Payer: COMMERCIAL

## 2021-04-19 ENCOUNTER — TELEPHONE (OUTPATIENT)
Dept: PHYSICAL THERAPY | Facility: HOSPITAL | Age: 51
End: 2021-04-19

## 2021-04-21 ENCOUNTER — OFFICE VISIT (OUTPATIENT)
Dept: SLEEP CENTER | Age: 51
End: 2021-04-21
Attending: INTERNAL MEDICINE
Payer: COMMERCIAL

## 2021-04-21 DIAGNOSIS — G47.09 OTHER INSOMNIA: ICD-10-CM

## 2021-04-21 PROCEDURE — 95806 SLEEP STUDY UNATT&RESP EFFT: CPT

## 2021-04-22 ENCOUNTER — APPOINTMENT (OUTPATIENT)
Dept: PHYSICAL THERAPY | Age: 51
End: 2021-04-22
Attending: NEUROLOGICAL SURGERY
Payer: COMMERCIAL

## 2021-04-27 ENCOUNTER — SLEEP STUDY (OUTPATIENT)
Dept: FAMILY MEDICINE CLINIC | Facility: CLINIC | Age: 51
End: 2021-04-27
Payer: COMMERCIAL

## 2021-04-27 ENCOUNTER — OFFICE VISIT (OUTPATIENT)
Dept: PHYSICAL THERAPY | Age: 51
End: 2021-04-27
Attending: NEUROLOGICAL SURGERY
Payer: COMMERCIAL

## 2021-04-27 DIAGNOSIS — G47.09 OTHER INSOMNIA: Primary | ICD-10-CM

## 2021-04-27 PROCEDURE — 95806 SLEEP STUDY UNATT&RESP EFFT: CPT | Performed by: FAMILY MEDICINE

## 2021-04-27 PROCEDURE — 97110 THERAPEUTIC EXERCISES: CPT

## 2021-04-27 PROCEDURE — 97140 MANUAL THERAPY 1/> REGIONS: CPT

## 2021-04-27 NOTE — PROGRESS NOTES
Dx:  Discitis of lumbar region (M46.46)             Insurance (Authorized # of Visits):  Medical Necessity           Authorizing Physician: Dr. Rika Curtis  Next MD visit: none scheduled  Fall Risk: standard         Precautions: n/a             Subjective: Pt 4/27/21  TX#: 8/10   THERE EX:  Warm up Nu step level 5, 8 mins  Incline board 30 secs 3x B LE  Manual stretch 30 secs 3x each LE hip flexor/quad  Contract relax 2 way piriformis and hamstring LLE 30 secs 3x   Lumbar rotations 15x2  Segmental bridge to inc traction grade 3-4 5 mins MANUAL:  STM effleurage and cross friction B lumbar paraspinals, L piriformis and L biceps femoris 10 mins   Prone PA L spine manual traction grade 3-4 5 mins MANUAL:  Posterior meniscal mob in knee extension with soft tissue rota

## 2021-04-27 NOTE — PROCEDURES
[address]       Accredited by the [facility] ([facility])    PATIENT'S NAME:        Florence Moses  ATTENDING PHYSICIAN:   Gerri Duarte MD  REFERRING PHYSICIAN:   KAYY Velásquez sedatives, and hypnotics which worsen obstructive sleep apnea. 6.   For periodic limb movement disorder, recommend evaluation of vitamin D and B12, ferritin, and evaluation for diabetes, renal disease, and thyroid disorders.     Electronically signed and

## 2021-04-30 ENCOUNTER — TELEPHONE (OUTPATIENT)
Dept: FAMILY MEDICINE CLINIC | Facility: CLINIC | Age: 51
End: 2021-04-30

## 2021-04-30 NOTE — TELEPHONE ENCOUNTER
Results of Home Sleep Study from 4/21/21 read by Dr. Manasa Lewis. Mild MYKEL. Recommend APAP treatment if patient is symptomatic. Patient should return for consult to further discuss these results. Flowsheet updated.     LM for patient to return call for results

## 2021-05-11 ENCOUNTER — OFFICE VISIT (OUTPATIENT)
Dept: PHYSICAL THERAPY | Age: 51
End: 2021-05-11
Attending: NEUROLOGICAL SURGERY
Payer: COMMERCIAL

## 2021-05-11 PROCEDURE — 97110 THERAPEUTIC EXERCISES: CPT

## 2021-05-11 NOTE — PROGRESS NOTES
Dx:  Discitis of lumbar region (M46.46)             Insurance (Authorized # of Visits):  1 Liv Lakhani Physician:  No ref.  provider found  Next MD visit: none scheduled  Fall Risk: standard         Precautions: n/a TX#: 4/10 Date: 4/6/21                TX#: 5/10 Date: 4/9/21  Tx#: 6/10 Date: 4/13/21  TX#: 7/10 Date: 4/27/21  TX#: 8/10 Date: 5/11/21  TX#: 9/10    THERE EX:  Warm up Nu step level 5, 8 mins  Incline board 30 secs 3x B LE  Manual stretch 30 secs 3x eac EX:  Warm up Nu step level 6, 11 mins  Incline board 30 secs 3x B LE  Standing hip flexor stretch 4 in step 30 secs 2x each LE   Standing Lateral shift hips to R 10x followed with straight plane standing umbar EXT 10x  Standing lumbar EXT 10x* 10x daily  C 45 min

## 2021-05-14 ENCOUNTER — OFFICE VISIT (OUTPATIENT)
Dept: PHYSICAL THERAPY | Age: 51
End: 2021-05-14
Attending: NEUROLOGICAL SURGERY
Payer: COMMERCIAL

## 2021-05-14 PROCEDURE — 97112 NEUROMUSCULAR REEDUCATION: CPT

## 2021-05-14 PROCEDURE — 97110 THERAPEUTIC EXERCISES: CPT

## 2021-05-14 NOTE — PROGRESS NOTES
Progress Summary  Pt has attended 10 visits in Physical Therapy.    Dx:  Discitis of lumbar region (M46.46)             Insurance (Authorized # of Visits):  Medical Necessity           Authorizing Physician: Dr. Karen Landaverde  Next MD visit: none scheduled  Nena mechanical pain.   Date:  4/2/21                TX#: 4/10 Date: 4/6/21                TX#: 5/10 Date: 4/9/21  Tx#: 6/10 Date: 4/13/21  TX#: 7/10 Date: 4/27/21  TX#: 8/10 Date: 5/11/21  TX#: 9/10 Date: 5/14/21  TX#: 10/10   THERE EX:  Warm up Nu step level 5 hamstring LLE 30 secs 3x   Hamstring loaded bridge on wall 10x2 THERE EX:  Warm up Nu step level 6, 11 mins  Incline board 30 secs 3x B LE  Standing hip flexor stretch 4 in step 30 secs 2x each LE   Standing Lateral shift hips to R 10x followed with shilpi and L biceps femoris 8 mins   Prone PA L spine manual traction grade 3-4 5 mins MANUAL:  Prone PA L spine manual traction grade 3-4 5 mins MANUAL:  Prone PA L spine manual traction grade 3-4 5 mins   NEURO RE ED:  Seated foam roll core iso push/pull, squis

## 2021-05-17 ENCOUNTER — TELEPHONE (OUTPATIENT)
Dept: PHYSICAL THERAPY | Facility: HOSPITAL | Age: 51
End: 2021-05-17

## 2021-05-17 ENCOUNTER — NURSE ONLY (OUTPATIENT)
Dept: FAMILY MEDICINE CLINIC | Facility: CLINIC | Age: 51
End: 2021-05-17
Payer: COMMERCIAL

## 2021-05-17 ENCOUNTER — TELEPHONE (OUTPATIENT)
Dept: FAMILY MEDICINE CLINIC | Facility: CLINIC | Age: 51
End: 2021-05-17

## 2021-05-17 DIAGNOSIS — R35.0 URINARY FREQUENCY: Primary | ICD-10-CM

## 2021-05-17 DIAGNOSIS — R35.0 URINARY FREQUENCY: ICD-10-CM

## 2021-05-17 PROCEDURE — 87147 CULTURE TYPE IMMUNOLOGIC: CPT | Performed by: FAMILY MEDICINE

## 2021-05-17 PROCEDURE — 87086 URINE CULTURE/COLONY COUNT: CPT | Performed by: FAMILY MEDICINE

## 2021-05-17 PROCEDURE — 81003 URINALYSIS AUTO W/O SCOPE: CPT | Performed by: FAMILY MEDICINE

## 2021-05-17 RX ORDER — NITROFURANTOIN 25; 75 MG/1; MG/1
100 CAPSULE ORAL 2 TIMES DAILY
Qty: 14 CAPSULE | Refills: 0 | Status: SHIPPED | OUTPATIENT
Start: 2021-05-17 | End: 2021-05-24

## 2021-05-17 NOTE — TELEPHONE ENCOUNTER
Okay to drop off a sample and send for culture. Also do a urinalysis/dip. Start on Macrobid 100 mg twice a day for 7 days pending the results of the culture. No

## 2021-05-18 ENCOUNTER — APPOINTMENT (OUTPATIENT)
Dept: PHYSICAL THERAPY | Age: 51
End: 2021-05-18
Attending: NEUROLOGICAL SURGERY
Payer: COMMERCIAL

## 2021-05-21 ENCOUNTER — APPOINTMENT (OUTPATIENT)
Dept: PHYSICAL THERAPY | Age: 51
End: 2021-05-21
Attending: NEUROLOGICAL SURGERY
Payer: COMMERCIAL

## 2021-06-11 ENCOUNTER — OFFICE VISIT (OUTPATIENT)
Dept: FAMILY MEDICINE CLINIC | Facility: CLINIC | Age: 51
End: 2021-06-11
Payer: COMMERCIAL

## 2021-06-11 VITALS
HEIGHT: 72 IN | SYSTOLIC BLOOD PRESSURE: 128 MMHG | BODY MASS INDEX: 41.04 KG/M2 | HEART RATE: 98 BPM | TEMPERATURE: 98 F | DIASTOLIC BLOOD PRESSURE: 80 MMHG | OXYGEN SATURATION: 98 % | WEIGHT: 303 LBS

## 2021-06-11 DIAGNOSIS — N39.0 RECURRENT UTI: ICD-10-CM

## 2021-06-11 DIAGNOSIS — Z87.448 HX OF URETHRAL STRICTURE: ICD-10-CM

## 2021-06-11 DIAGNOSIS — R30.0 DYSURIA: Primary | ICD-10-CM

## 2021-06-11 DIAGNOSIS — R35.0 URINARY FREQUENCY: ICD-10-CM

## 2021-06-11 PROCEDURE — 3079F DIAST BP 80-89 MM HG: CPT | Performed by: NURSE PRACTITIONER

## 2021-06-11 PROCEDURE — 87077 CULTURE AEROBIC IDENTIFY: CPT | Performed by: NURSE PRACTITIONER

## 2021-06-11 PROCEDURE — 3008F BODY MASS INDEX DOCD: CPT | Performed by: NURSE PRACTITIONER

## 2021-06-11 PROCEDURE — 99214 OFFICE O/P EST MOD 30 MIN: CPT | Performed by: NURSE PRACTITIONER

## 2021-06-11 PROCEDURE — 3074F SYST BP LT 130 MM HG: CPT | Performed by: NURSE PRACTITIONER

## 2021-06-11 PROCEDURE — 87186 SC STD MICRODIL/AGAR DIL: CPT | Performed by: NURSE PRACTITIONER

## 2021-06-11 PROCEDURE — 87086 URINE CULTURE/COLONY COUNT: CPT | Performed by: NURSE PRACTITIONER

## 2021-06-11 PROCEDURE — 81003 URINALYSIS AUTO W/O SCOPE: CPT | Performed by: NURSE PRACTITIONER

## 2021-06-11 RX ORDER — NITROFURANTOIN 25; 75 MG/1; MG/1
100 CAPSULE ORAL 2 TIMES DAILY
Qty: 14 CAPSULE | Refills: 0 | Status: SHIPPED | OUTPATIENT
Start: 2021-06-11 | End: 2021-06-18

## 2021-07-12 NOTE — TELEPHONE ENCOUNTER
Panda Fosterjonny states he has a hx of frequent UTI's. He states his sx started this AM. Reports urinary frequency. Wants to know if he can drop off a sample since this is not a new problem, or if he should see you. Please advise. No

## 2021-08-16 ENCOUNTER — OFFICE VISIT (OUTPATIENT)
Dept: FAMILY MEDICINE CLINIC | Facility: CLINIC | Age: 51
End: 2021-08-16
Payer: COMMERCIAL

## 2021-08-16 ENCOUNTER — TELEPHONE (OUTPATIENT)
Dept: FAMILY MEDICINE CLINIC | Facility: CLINIC | Age: 51
End: 2021-08-16

## 2021-08-16 VITALS
BODY MASS INDEX: 40.9 KG/M2 | OXYGEN SATURATION: 98 % | HEART RATE: 95 BPM | SYSTOLIC BLOOD PRESSURE: 132 MMHG | RESPIRATION RATE: 18 BRPM | TEMPERATURE: 99 F | HEIGHT: 72 IN | DIASTOLIC BLOOD PRESSURE: 76 MMHG | WEIGHT: 302 LBS

## 2021-08-16 DIAGNOSIS — Z20.822 ENCOUNTER FOR LABORATORY TESTING FOR COVID-19 VIRUS: ICD-10-CM

## 2021-08-16 DIAGNOSIS — R52 BODY ACHES: ICD-10-CM

## 2021-08-16 DIAGNOSIS — R39.9 UTI SYMPTOMS: Primary | ICD-10-CM

## 2021-08-16 LAB
APPEARANCE: CLEAR
BILIRUBIN: NEGATIVE
GLUCOSE (URINE DIPSTICK): NEGATIVE MG/DL
KETONES (URINE DIPSTICK): NEGATIVE MG/DL
MULTISTIX LOT#: 5077 NUMERIC
NITRITE, URINE: NEGATIVE
OCCULT BLOOD: NEGATIVE
PH, URINE: 5.5 (ref 4.5–8)
PROTEIN (URINE DIPSTICK): NEGATIVE MG/DL
SPECIFIC GRAVITY: 1.02 (ref 1–1.03)
URINE-COLOR: YELLOW
UROBILINOGEN,SEMI-QN: 0.2 MG/DL (ref 0–1.9)

## 2021-08-16 PROCEDURE — 87077 CULTURE AEROBIC IDENTIFY: CPT | Performed by: NURSE PRACTITIONER

## 2021-08-16 PROCEDURE — 3075F SYST BP GE 130 - 139MM HG: CPT | Performed by: NURSE PRACTITIONER

## 2021-08-16 PROCEDURE — 3078F DIAST BP <80 MM HG: CPT | Performed by: NURSE PRACTITIONER

## 2021-08-16 PROCEDURE — 3008F BODY MASS INDEX DOCD: CPT | Performed by: NURSE PRACTITIONER

## 2021-08-16 PROCEDURE — 99213 OFFICE O/P EST LOW 20 MIN: CPT | Performed by: NURSE PRACTITIONER

## 2021-08-16 PROCEDURE — 87086 URINE CULTURE/COLONY COUNT: CPT | Performed by: NURSE PRACTITIONER

## 2021-08-16 PROCEDURE — 81003 URINALYSIS AUTO W/O SCOPE: CPT | Performed by: NURSE PRACTITIONER

## 2021-08-16 RX ORDER — CIPROFLOXACIN 500 MG/1
500 TABLET, FILM COATED ORAL 2 TIMES DAILY
Qty: 20 TABLET | Refills: 0 | Status: SHIPPED | OUTPATIENT
Start: 2021-08-16 | End: 2021-08-26

## 2021-08-16 RX ORDER — CIPROFLOXACIN 500 MG/1
TABLET, FILM COATED ORAL
Qty: 14 TABLET | Refills: 0 | OUTPATIENT
Start: 2021-08-16

## 2021-08-16 NOTE — PATIENT INSTRUCTIONS
UTI Symptoms:  1. Rest. Drink plenty of fluids. 2. Ciprofloxacin as prescribed. 3. We will send urine culture to lab and call you with results in 3-4 days.  At that time we will discuss continuing, stopping, or changing the antibiotic based on the urine c penis. It is treated with antibiotics. · Prostatitis. This is an inflammation or infection of the prostate. You may have an urgent or frequent need to urinate, fever, or burning when you urinate.  Or you may have a sore prostate, or a vague feeling of pres on 1/1/2020 © 2000-2021 The [facility]. All rights reserved. This information is not intended as a substitute for professional medical care. Always follow your healthcare professional's instructions.       Coronavirus Disease 2019 (COVID-19) your local public health department if you need to quarantine.  Options they will consider include stopping quarantine  • After 14 days from date of last exposure  • After 10 days without testing from date of last exposure  • After day 7 from date of last e dishes, towels, and bedding   10. Clean all surfaces that are touched often, like counters, tabletops, and doorknobs. Use household cleaning sprays or wipes according to the label instructions.          Seek Further Care     If you are awaiting test results results will be called to you from an [facility] representative. If you have not received a call within 2 business days, please call your primary care provider or check [facility] for results.     Post-Discharge Follow-up  If you are diagnosed with COVID, Centers for Disease Control & Prevention (CDC)  What to do if you are sick with coronavirus disease 2019, [employer]pt. pdf  Centers for Disease Control & Prevention (CDC)  8 things yo public  • Avoid large gatherings  • Wash your hands frequently  • Stay at least 6 feet away from other people    References:  Long haulers: Why some people experience long-term coronavirus symptoms. (2021, February 08).  Retrieved March 17, 2021, from https

## 2021-08-16 NOTE — PROGRESS NOTES
CHIEF COMPLAINT:   Patient presents with:  UTI: frequent uti's. Dysuria,       HPI:   Kenisha Ellis is a 52year old male who presents with concerns of a uti. Pt reports dysuria, urgency, increased frequency and body aches since this morning.   He notes cough or shortness of breath  CARDIAC: denies chest pain or palpitations. GI: Denies abdominal pain. No N/V/D. : Denies any dysuria, hematuria, flank pain, increased urinary frequency or any scrotal/testicular pain or swelling.   NEURO: Denies headache Covid-19 test sent to lab. Discussed HPI, urine dip and, and physical exam with pt. We discussed uti vs other etiologies. No obvious signs of pyelonephritis or kidney stone.  Pt notes he does get body aches with past uti's but aggreeable to send covid- results. (If positive self quarantine should extend until at least 10 days from onset of symptoms, AND no fever for at least 24hrs, AND and improvement in symptoms). 4. Follow up with PMD as needed.  Go to the emergency department immediately if symptoms wo infection. Take antibiotics exactly as directed until all of the medicine is gone. If you don't, the infection may not go away and may become harder to treat. For certain types of UTIs, you may be given other medicine to help treat your symptoms.    Lucretia Salazar results, aftercare, and plasma donation.       Quarantine (for anyone in close contact with someone who has COVID-19)  Anyone who has been in close contact with someone who has COVID-19 should quarantine at home for 14 days from the time of exposure and fol to endorse quarantine for 14 days and recognizes that any quarantine shorter than 14 days balances reduced burden against a small possibility of spreading the virus. 10 Ways to Manage Your Health at Home      1.  Stay home from work, school, and away fro been exposed or are not aware of an exposure to COVID-19 and are concerned about your symptoms, please contact your health care provider with any questions.     Home Isolation  If you have tested positive for COVID-19, you should remain under home isolation Convalescent plasma is a component of blood that, in people who have recovered from COVID-19, contains antibodies against the virus.  The antibodies in plasma can be used as a treatment for patients in our community who are most severely affected by the vir symptoms. Post COVID conditions are a wide range of new, returning, or ongoing health problems. Talk to your provider if you are not feeling well 4 or more weeks after being diagnosed with COVID-19.   Patients with Post-COVID conditions may experience one of these issues and agrees to the plan.

## 2021-08-16 NOTE — TELEPHONE ENCOUNTER
Reports hx of frequent UTI's. Hx of urethral stricture. Would like to be see now or have something called in b/c he's having dysuria and feels very uncomfortable. Advised Dr. Millicent Hermosillo is out of the office.  Offered an appt w/Dr. Arguelles at 3:15pm. Also

## 2021-08-17 ENCOUNTER — TELEPHONE (OUTPATIENT)
Dept: FAMILY MEDICINE CLINIC | Facility: CLINIC | Age: 51
End: 2021-08-17

## 2021-08-17 RX ORDER — AMOXICILLIN 875 MG/1
875 TABLET, COATED ORAL 2 TIMES DAILY
Qty: 14 TABLET | Refills: 0 | Status: SHIPPED | OUTPATIENT
Start: 2021-08-17 | End: 2021-08-24

## 2021-08-17 NOTE — TELEPHONE ENCOUNTER
Urine culture shows Group B strep and aeroccus urinae. Both susceptible to ampicillin's. Pt notes symptoms improving but not completely resolved. Treatment options discussed with patient and explained in detail.  Prefers to switch to amoxicillin tod

## 2021-08-18 LAB — SARS-COV-2 RNA RESP QL NAA+PROBE: NOT DETECTED

## 2021-10-27 ENCOUNTER — OFFICE VISIT (OUTPATIENT)
Dept: FAMILY MEDICINE CLINIC | Facility: CLINIC | Age: 51
End: 2021-10-27
Payer: COMMERCIAL

## 2021-10-27 VITALS
HEART RATE: 96 BPM | WEIGHT: 302 LBS | BODY MASS INDEX: 38.76 KG/M2 | TEMPERATURE: 98 F | HEIGHT: 74 IN | DIASTOLIC BLOOD PRESSURE: 81 MMHG | SYSTOLIC BLOOD PRESSURE: 135 MMHG | OXYGEN SATURATION: 97 %

## 2021-10-27 DIAGNOSIS — Z20.822 ENCOUNTER FOR LABORATORY TESTING FOR COVID-19 VIRUS: ICD-10-CM

## 2021-10-27 DIAGNOSIS — R06.2 WHEEZING: ICD-10-CM

## 2021-10-27 DIAGNOSIS — R05.9 COUGH: ICD-10-CM

## 2021-10-27 DIAGNOSIS — J20.9 ACUTE BRONCHITIS, UNSPECIFIED ORGANISM: Primary | ICD-10-CM

## 2021-10-27 PROCEDURE — 99213 OFFICE O/P EST LOW 20 MIN: CPT | Performed by: FAMILY MEDICINE

## 2021-10-27 PROCEDURE — 3008F BODY MASS INDEX DOCD: CPT | Performed by: FAMILY MEDICINE

## 2021-10-27 PROCEDURE — U0002 COVID-19 LAB TEST NON-CDC: HCPCS | Performed by: FAMILY MEDICINE

## 2021-10-27 PROCEDURE — 3079F DIAST BP 80-89 MM HG: CPT | Performed by: FAMILY MEDICINE

## 2021-10-27 PROCEDURE — 3075F SYST BP GE 130 - 139MM HG: CPT | Performed by: FAMILY MEDICINE

## 2021-10-27 RX ORDER — BENZONATATE 200 MG/1
200 CAPSULE ORAL 3 TIMES DAILY PRN
Qty: 30 CAPSULE | Refills: 0 | Status: SHIPPED | OUTPATIENT
Start: 2021-10-27

## 2021-10-27 RX ORDER — PREDNISONE 20 MG/1
40 TABLET ORAL DAILY
Qty: 10 TABLET | Refills: 0 | Status: SHIPPED | OUTPATIENT
Start: 2021-10-27 | End: 2021-11-01

## 2021-10-27 NOTE — PROGRESS NOTES
Tanja Fonseca is a 54year old male. S:  Patient presents today with the following concerns:  · Cold symptoms-cough. Unable to sleep at night. Maybe some wheezing with cough. No dyspnea. Symptoms began 3 days ago. No N/V/D.   No loss of taste or sm crackles. CARDIO: RRR without murmur  EXTREMITIES: no edema  NEURO: Oriented times three,cranial nerves are intact,motor and sensory are grossly intact    Rapid Covid-19 test is NEGATIVE    ASSESSMENT AND PLAN:  Kit Becker is a 54year old male.   Ac

## 2022-04-24 ENCOUNTER — OFFICE VISIT (OUTPATIENT)
Dept: FAMILY MEDICINE CLINIC | Facility: CLINIC | Age: 52
End: 2022-04-24
Payer: COMMERCIAL

## 2022-04-24 VITALS
OXYGEN SATURATION: 98 % | DIASTOLIC BLOOD PRESSURE: 82 MMHG | TEMPERATURE: 98 F | SYSTOLIC BLOOD PRESSURE: 142 MMHG | HEART RATE: 80 BPM

## 2022-04-24 DIAGNOSIS — Z87.440 HISTORY OF RECURRENT UTIS: ICD-10-CM

## 2022-04-24 DIAGNOSIS — R50.9 FEVER IN ADULT: ICD-10-CM

## 2022-04-24 DIAGNOSIS — Z87.448 HX OF URETHRAL STRICTURE: ICD-10-CM

## 2022-04-24 DIAGNOSIS — R03.0 ELEVATED BLOOD PRESSURE READING IN OFFICE WITHOUT DIAGNOSIS OF HYPERTENSION: ICD-10-CM

## 2022-04-24 DIAGNOSIS — N30.00 ACUTE CYSTITIS WITHOUT HEMATURIA: Primary | ICD-10-CM

## 2022-04-24 DIAGNOSIS — M79.10 MYALGIA: ICD-10-CM

## 2022-04-24 DIAGNOSIS — R30.0 DYSURIA: ICD-10-CM

## 2022-04-24 LAB
APPEARANCE: CLEAR
GLUCOSE (URINE DIPSTICK): NEGATIVE MG/DL
MULTISTIX LOT#: ABNORMAL NUMERIC
NITRITE, URINE: NEGATIVE
OCCULT BLOOD: NEGATIVE
PH, URINE: 5.5 (ref 4.5–8)
PROTEIN (URINE DIPSTICK): 30 MG/DL
SPECIFIC GRAVITY: >=1.03 (ref 1–1.03)
UROBILINOGEN,SEMI-QN: 0.2 MG/DL (ref 0–1.9)

## 2022-04-24 PROCEDURE — 3079F DIAST BP 80-89 MM HG: CPT | Performed by: PHYSICIAN ASSISTANT

## 2022-04-24 PROCEDURE — 81003 URINALYSIS AUTO W/O SCOPE: CPT | Performed by: PHYSICIAN ASSISTANT

## 2022-04-24 PROCEDURE — 87086 URINE CULTURE/COLONY COUNT: CPT | Performed by: PHYSICIAN ASSISTANT

## 2022-04-24 PROCEDURE — 3077F SYST BP >= 140 MM HG: CPT | Performed by: PHYSICIAN ASSISTANT

## 2022-04-24 PROCEDURE — 99213 OFFICE O/P EST LOW 20 MIN: CPT | Performed by: PHYSICIAN ASSISTANT

## 2022-04-24 RX ORDER — AMOXICILLIN 875 MG/1
875 TABLET, COATED ORAL 2 TIMES DAILY
COMMUNITY

## 2022-04-24 RX ORDER — OMEGA-3 FATTY ACIDS/FISH OIL 300-1000MG
400 CAPSULE ORAL
COMMUNITY

## 2022-04-24 RX ORDER — AMOXICILLIN 875 MG/1
875 TABLET, COATED ORAL 2 TIMES DAILY
Qty: 20 TABLET | Refills: 0 | Status: SHIPPED | OUTPATIENT
Start: 2022-04-24 | End: 2022-05-04

## 2022-05-02 ENCOUNTER — OFFICE VISIT (OUTPATIENT)
Dept: FAMILY MEDICINE CLINIC | Facility: CLINIC | Age: 52
End: 2022-05-02
Payer: COMMERCIAL

## 2022-05-02 VITALS
DIASTOLIC BLOOD PRESSURE: 88 MMHG | RESPIRATION RATE: 16 BRPM | TEMPERATURE: 98 F | HEIGHT: 74 IN | WEIGHT: 315 LBS | BODY MASS INDEX: 40.43 KG/M2 | HEART RATE: 95 BPM | SYSTOLIC BLOOD PRESSURE: 130 MMHG | OXYGEN SATURATION: 97 %

## 2022-05-02 DIAGNOSIS — N30.00 ACUTE CYSTITIS WITHOUT HEMATURIA: Primary | ICD-10-CM

## 2022-05-02 PROCEDURE — 3075F SYST BP GE 130 - 139MM HG: CPT | Performed by: FAMILY MEDICINE

## 2022-05-02 PROCEDURE — 3008F BODY MASS INDEX DOCD: CPT | Performed by: FAMILY MEDICINE

## 2022-05-02 PROCEDURE — 3079F DIAST BP 80-89 MM HG: CPT | Performed by: FAMILY MEDICINE

## 2022-05-02 PROCEDURE — 99213 OFFICE O/P EST LOW 20 MIN: CPT | Performed by: FAMILY MEDICINE

## 2022-05-02 RX ORDER — CIPROFLOXACIN 250 MG/1
250 TABLET, FILM COATED ORAL 2 TIMES DAILY
Qty: 14 TABLET | Refills: 0 | Status: SHIPPED | OUTPATIENT
Start: 2022-05-02 | End: 2022-05-09

## 2022-06-16 ENCOUNTER — OFFICE VISIT (OUTPATIENT)
Dept: FAMILY MEDICINE CLINIC | Facility: CLINIC | Age: 52
End: 2022-06-16
Payer: COMMERCIAL

## 2022-06-16 VITALS
OXYGEN SATURATION: 96 % | WEIGHT: 314.38 LBS | BODY MASS INDEX: 40.35 KG/M2 | TEMPERATURE: 98 F | HEIGHT: 74 IN | DIASTOLIC BLOOD PRESSURE: 80 MMHG | RESPIRATION RATE: 16 BRPM | HEART RATE: 74 BPM | SYSTOLIC BLOOD PRESSURE: 126 MMHG

## 2022-06-16 DIAGNOSIS — R42 VERTIGO: Primary | ICD-10-CM

## 2022-06-16 DIAGNOSIS — Z12.11 COLON CANCER SCREENING: ICD-10-CM

## 2022-06-16 PROCEDURE — 99214 OFFICE O/P EST MOD 30 MIN: CPT | Performed by: FAMILY MEDICINE

## 2022-06-16 PROCEDURE — 3008F BODY MASS INDEX DOCD: CPT | Performed by: FAMILY MEDICINE

## 2022-06-16 PROCEDURE — 3074F SYST BP LT 130 MM HG: CPT | Performed by: FAMILY MEDICINE

## 2022-06-16 PROCEDURE — 3079F DIAST BP 80-89 MM HG: CPT | Performed by: FAMILY MEDICINE

## 2022-06-16 RX ORDER — ALPRAZOLAM 0.5 MG/1
0.5 TABLET ORAL NIGHTLY PRN
Qty: 15 TABLET | Refills: 0 | Status: SHIPPED | OUTPATIENT
Start: 2022-06-16

## 2022-07-05 RX ORDER — ALPRAZOLAM 0.5 MG/1
0.5 TABLET ORAL NIGHTLY PRN
Qty: 15 TABLET | Refills: 1 | Status: SHIPPED | OUTPATIENT
Start: 2022-07-05

## 2022-07-05 NOTE — TELEPHONE ENCOUNTER
Tati Macario     THIS IS WORKING    MIKE IN [place]     Formerly [facility], [facility] JUST PUT HIM ON THIS

## 2022-08-25 RX ORDER — ALPRAZOLAM 0.5 MG/1
0.5 TABLET ORAL NIGHTLY PRN
Qty: 15 TABLET | Refills: 1 | Status: SHIPPED | OUTPATIENT
Start: 2022-08-25

## 2022-09-21 RX ORDER — ALPRAZOLAM 0.5 MG/1
0.5 TABLET ORAL NIGHTLY PRN
Qty: 15 TABLET | Refills: 1 | Status: SHIPPED | OUTPATIENT
Start: 2022-09-21

## 2022-10-20 ENCOUNTER — TELEPHONE (OUTPATIENT)
Dept: FAMILY MEDICINE CLINIC | Facility: CLINIC | Age: 52
End: 2022-10-20

## 2022-10-20 DIAGNOSIS — R30.0 DYSURIA: ICD-10-CM

## 2022-10-20 RX ORDER — NITROFURANTOIN 25; 75 MG/1; MG/1
100 CAPSULE ORAL 2 TIMES DAILY
Qty: 14 CAPSULE | Refills: 1 | Status: SHIPPED | OUTPATIENT
Start: 2022-10-20 | End: 2022-10-27

## 2022-10-20 NOTE — TELEPHONE ENCOUNTER
PT. THINKS HE HAS A UTI/SYMPTOMS: FEVER, CHILLS, PAIN WITH URINATION/FREQUENCY  ASKING FOR ANTIBIOTIC SINCE NO OPENINGS.

## 2022-10-20 NOTE — TELEPHONE ENCOUNTER
Spoke with Dr. Justine Marques who states to refill Nitrofurantoin with 1 refill. Patient should schedule a follow up with Dr. Justine Marques 3 days after completion of antibiotic. Patient notified and verbalized understanding. Script sent and follow up scheduled.   Future Appointments   Date Time Provider Shabbir Aldana   11/1/2022 10:00 AM Ashish Arguelles MD [facility] [place]

## 2022-10-20 NOTE — TELEPHONE ENCOUNTER
Spoke with patient who states his symptoms started last night. Has fever, chills, increased urination and burning. He has a history of bladder surgeries due to small urethra. He states he usually gets about 3 uti's a year. He has 2 nitrofurantoin at home, so he has taken 2 doses. He is wanting to know if he can get a script called in to pharmacy and then schedule a follow up upon completion. Please advise.

## 2022-11-01 ENCOUNTER — OFFICE VISIT (OUTPATIENT)
Dept: FAMILY MEDICINE CLINIC | Facility: CLINIC | Age: 52
End: 2022-11-01
Payer: COMMERCIAL

## 2022-11-01 VITALS
BODY MASS INDEX: 40.43 KG/M2 | OXYGEN SATURATION: 99 % | SYSTOLIC BLOOD PRESSURE: 134 MMHG | HEART RATE: 78 BPM | WEIGHT: 315 LBS | DIASTOLIC BLOOD PRESSURE: 88 MMHG | TEMPERATURE: 98 F | HEIGHT: 74 IN | RESPIRATION RATE: 12 BRPM

## 2022-11-01 DIAGNOSIS — N30.00 ACUTE CYSTITIS WITHOUT HEMATURIA: Primary | ICD-10-CM

## 2022-11-01 DIAGNOSIS — Z87.440 HISTORY OF RECURRENT UTIS: ICD-10-CM

## 2022-11-01 DIAGNOSIS — Z87.448 HX OF URETHRAL STRICTURE: ICD-10-CM

## 2022-11-01 DIAGNOSIS — Z23 NEED FOR VACCINATION: ICD-10-CM

## 2022-11-01 PROBLEM — G06.2 EPIDURAL ABSCESS (HCC): Status: RESOLVED | Noted: 2021-01-04 | Resolved: 2022-11-01

## 2022-11-01 PROBLEM — G06.2 EPIDURAL ABSCESS: Status: RESOLVED | Noted: 2021-01-04 | Resolved: 2022-11-01

## 2022-11-01 PROBLEM — M46.46 DISCITIS OF LUMBAR REGION: Status: RESOLVED | Noted: 2021-01-04 | Resolved: 2022-11-01

## 2022-11-01 PROCEDURE — 90686 IIV4 VACC NO PRSV 0.5 ML IM: CPT | Performed by: FAMILY MEDICINE

## 2022-11-01 PROCEDURE — 90471 IMMUNIZATION ADMIN: CPT | Performed by: FAMILY MEDICINE

## 2022-11-01 PROCEDURE — 3008F BODY MASS INDEX DOCD: CPT | Performed by: FAMILY MEDICINE

## 2022-11-01 PROCEDURE — 99213 OFFICE O/P EST LOW 20 MIN: CPT | Performed by: FAMILY MEDICINE

## 2022-11-01 PROCEDURE — 3075F SYST BP GE 130 - 139MM HG: CPT | Performed by: FAMILY MEDICINE

## 2022-11-01 PROCEDURE — 3079F DIAST BP 80-89 MM HG: CPT | Performed by: FAMILY MEDICINE

## 2022-11-14 RX ORDER — ALPRAZOLAM 0.5 MG/1
0.5 TABLET ORAL NIGHTLY PRN
Qty: 15 TABLET | Refills: 1 | Status: SHIPPED | OUTPATIENT
Start: 2022-11-14

## 2022-12-13 ENCOUNTER — OFFICE VISIT (OUTPATIENT)
Dept: FAMILY MEDICINE CLINIC | Facility: CLINIC | Age: 52
End: 2022-12-13
Payer: COMMERCIAL

## 2022-12-13 VITALS
TEMPERATURE: 97 F | SYSTOLIC BLOOD PRESSURE: 138 MMHG | HEART RATE: 95 BPM | DIASTOLIC BLOOD PRESSURE: 80 MMHG | RESPIRATION RATE: 12 BRPM | WEIGHT: 315 LBS | OXYGEN SATURATION: 96 % | BODY MASS INDEX: 41 KG/M2

## 2022-12-13 DIAGNOSIS — J32.9 SINOBRONCHITIS: Primary | ICD-10-CM

## 2022-12-13 DIAGNOSIS — R05.1 ACUTE COUGH: ICD-10-CM

## 2022-12-13 DIAGNOSIS — J40 SINOBRONCHITIS: Primary | ICD-10-CM

## 2022-12-13 PROCEDURE — 99213 OFFICE O/P EST LOW 20 MIN: CPT | Performed by: FAMILY MEDICINE

## 2022-12-13 PROCEDURE — 3075F SYST BP GE 130 - 139MM HG: CPT | Performed by: FAMILY MEDICINE

## 2022-12-13 PROCEDURE — 3079F DIAST BP 80-89 MM HG: CPT | Performed by: FAMILY MEDICINE

## 2022-12-13 RX ORDER — ALBUTEROL SULFATE 2.5 MG/3ML
2.5 SOLUTION RESPIRATORY (INHALATION) EVERY 4 HOURS PRN
Qty: 75 ML | Refills: 0 | Status: SHIPPED | OUTPATIENT
Start: 2022-12-13

## 2022-12-13 RX ORDER — AMOXICILLIN AND CLAVULANATE POTASSIUM 875; 125 MG/1; MG/1
1 TABLET, FILM COATED ORAL 2 TIMES DAILY
Qty: 20 TABLET | Refills: 0 | Status: SHIPPED | OUTPATIENT
Start: 2022-12-13 | End: 2022-12-23

## 2022-12-13 RX ORDER — PREDNISONE 20 MG/1
TABLET ORAL
Qty: 15 TABLET | Refills: 0 | Status: SHIPPED | OUTPATIENT
Start: 2022-12-13 | End: 2022-12-23

## 2023-01-25 RX ORDER — ALPRAZOLAM 0.5 MG/1
0.5 TABLET ORAL NIGHTLY PRN
Qty: 15 TABLET | Refills: 1 | Status: SHIPPED | OUTPATIENT
Start: 2023-01-25

## 2023-02-27 NOTE — PROGRESS NOTES
HPI: HPI   Patient is here for possible UTI. He has recurrent UTIs due to history of urethral stricture. Sees urologist at [facility] in [facility]. Last visit fall 2020. Had suspicious symptoms in May and was given Macrobid.  Took a couple of doses but did not co Patience Mom is calling to request a refill on the following medication(s):    Medication Request:  Requested Prescriptions     Pending Prescriptions Disp Refills    hydrOXYzine HCl (ATARAX) 10 MG tablet [Pharmacy Med Name: hydrOXYzine HCl 10 MG Oral Tablet] 30 tablet      Sig: TAKE 1 TABLET BY MOUTH THREE TIMES DAILY AS NEEDED FOR ANXIETY       Last Visit Date (If Applicable):  2/65/2559    Next Visit Date:    4/24/2023 frequency, hematuria and urgency. Musculoskeletal: Positive for back pain (chronic, lower back, mild. Nothing compared to his previous MRSA infection).        EXAM:   /80   Pulse 98   Temp 98.2 °F (36.8 °C) (Temporal)   Ht 6' (1.829 m)   Wt (!) 303

## 2023-03-15 RX ORDER — ALPRAZOLAM 0.5 MG/1
0.5 TABLET ORAL NIGHTLY PRN
Qty: 15 TABLET | Refills: 1 | Status: SHIPPED | OUTPATIENT
Start: 2023-03-15

## 2023-03-15 NOTE — TELEPHONE ENCOUNTER
LOV  12-13-22     LAST LAB  3-29-21 CBC/CRP    LAST RX  2-15-23 #15    Next OV No future appointments.

## 2023-03-17 ENCOUNTER — TELEPHONE (OUTPATIENT)
Dept: FAMILY MEDICINE CLINIC | Facility: CLINIC | Age: 53
End: 2023-03-17

## 2023-03-17 DIAGNOSIS — R30.0 DYSURIA: ICD-10-CM

## 2023-03-17 RX ORDER — NITROFURANTOIN 25; 75 MG/1; MG/1
100 CAPSULE ORAL 2 TIMES DAILY
Qty: 14 CAPSULE | Refills: 1 | Status: SHIPPED | OUTPATIENT
Start: 2023-03-17 | End: 2023-03-24

## 2023-03-17 NOTE — TELEPHONE ENCOUNTER
Spoke with patient who states he has urinary frequency and burning. He had a couple of the nitrofurantoin capsules left and took those. Can he get a new script?

## 2023-03-17 NOTE — TELEPHONE ENCOUNTER
Dr. Mary Correa, he does have a history of UTI's. NKDA. Do you want to call something in, or do you want him to drop off a urine sample with urine dip and culture, or do you want him evaluated? Please advise.

## 2023-05-03 ENCOUNTER — TELEPHONE (OUTPATIENT)
Facility: LOCATION | Age: 53
End: 2023-05-03

## 2023-05-22 ENCOUNTER — OFFICE VISIT (OUTPATIENT)
Dept: FAMILY MEDICINE CLINIC | Facility: CLINIC | Age: 53
End: 2023-05-22
Payer: COMMERCIAL

## 2023-05-22 VITALS
TEMPERATURE: 97 F | RESPIRATION RATE: 12 BRPM | OXYGEN SATURATION: 97 % | DIASTOLIC BLOOD PRESSURE: 80 MMHG | WEIGHT: 310 LBS | BODY MASS INDEX: 39.78 KG/M2 | HEART RATE: 104 BPM | SYSTOLIC BLOOD PRESSURE: 102 MMHG | HEIGHT: 74 IN

## 2023-05-22 DIAGNOSIS — N30.00 ACUTE CYSTITIS WITHOUT HEMATURIA: Primary | ICD-10-CM

## 2023-05-22 DIAGNOSIS — E27.8 ADRENAL NODULE (HCC): ICD-10-CM

## 2023-05-22 DIAGNOSIS — K76.0 HEPATIC STEATOSIS: ICD-10-CM

## 2023-05-22 DIAGNOSIS — R93.5 ABNORMAL FINDINGS ON DIAGNOSTIC IMAGING OF OTHER ABDOMINAL REGIONS, INCLUDING RETROPERITONEUM: ICD-10-CM

## 2023-05-22 PROCEDURE — 99214 OFFICE O/P EST MOD 30 MIN: CPT | Performed by: FAMILY MEDICINE

## 2023-05-22 PROCEDURE — 3079F DIAST BP 80-89 MM HG: CPT | Performed by: FAMILY MEDICINE

## 2023-05-22 PROCEDURE — 3008F BODY MASS INDEX DOCD: CPT | Performed by: FAMILY MEDICINE

## 2023-05-22 PROCEDURE — 3074F SYST BP LT 130 MM HG: CPT | Performed by: FAMILY MEDICINE

## 2023-05-22 RX ORDER — PHENAZOPYRIDINE HYDROCHLORIDE 200 MG/1
200 TABLET, FILM COATED ORAL 3 TIMES DAILY PRN
COMMUNITY
Start: 2023-05-19 | End: 2023-05-27

## 2023-05-22 RX ORDER — ALPRAZOLAM 0.5 MG/1
TABLET ORAL
Qty: 15 TABLET | Refills: 0 | Status: SHIPPED | OUTPATIENT
Start: 2023-05-22

## 2023-05-22 RX ORDER — CIPROFLOXACIN 500 MG/1
500 TABLET, FILM COATED ORAL 2 TIMES DAILY
COMMUNITY
Start: 2023-05-19 | End: 2023-05-22

## 2023-05-22 RX ORDER — AMOXICILLIN AND CLAVULANATE POTASSIUM 875; 125 MG/1; MG/1
1 TABLET, FILM COATED ORAL 2 TIMES DAILY
Qty: 20 TABLET | Refills: 0 | Status: SHIPPED | OUTPATIENT
Start: 2023-05-22 | End: 2023-06-01

## 2023-05-28 PROBLEM — E27.8 ADRENAL NODULE (HCC): Status: ACTIVE | Noted: 2023-05-28

## 2023-05-28 PROBLEM — E27.9 ADRENAL NODULE (HCC): Status: ACTIVE | Noted: 2023-05-28

## 2023-05-29 RX ORDER — PHENAZOPYRIDINE HYDROCHLORIDE 200 MG/1
200 TABLET, FILM COATED ORAL 3 TIMES DAILY PRN
Qty: 45 TABLET | Refills: 1 | Status: SHIPPED | OUTPATIENT
Start: 2023-05-29 | End: 2023-06-13

## 2023-05-30 ENCOUNTER — OFFICE VISIT (OUTPATIENT)
Dept: FAMILY MEDICINE CLINIC | Facility: CLINIC | Age: 53
End: 2023-05-30
Payer: COMMERCIAL

## 2023-05-30 VITALS
TEMPERATURE: 98 F | OXYGEN SATURATION: 99 % | HEIGHT: 74 IN | BODY MASS INDEX: 39.27 KG/M2 | HEART RATE: 87 BPM | WEIGHT: 306 LBS | DIASTOLIC BLOOD PRESSURE: 88 MMHG | RESPIRATION RATE: 12 BRPM | SYSTOLIC BLOOD PRESSURE: 130 MMHG

## 2023-05-30 DIAGNOSIS — N30.00 ACUTE CYSTITIS WITHOUT HEMATURIA: Primary | ICD-10-CM

## 2023-05-30 LAB
APPEARANCE: CLEAR
BILIRUBIN: NEGATIVE
GLUCOSE (URINE DIPSTICK): NEGATIVE MG/DL
KETONES (URINE DIPSTICK): NEGATIVE MG/DL
MULTISTIX LOT#: ABNORMAL NUMERIC
NITRITE, URINE: POSITIVE
OCCULT BLOOD: NEGATIVE
PH, URINE: 5.5 (ref 4.5–8)
PROTEIN (URINE DIPSTICK): NEGATIVE MG/DL
SPECIFIC GRAVITY: 1.01 (ref 1–1.03)
URINE-COLOR: YELLOW
UROBILINOGEN,SEMI-QN: 0.2 MG/DL (ref 0–1.9)

## 2023-05-30 PROCEDURE — 3075F SYST BP GE 130 - 139MM HG: CPT | Performed by: FAMILY MEDICINE

## 2023-05-30 PROCEDURE — 87086 URINE CULTURE/COLONY COUNT: CPT | Performed by: FAMILY MEDICINE

## 2023-05-30 PROCEDURE — 81003 URINALYSIS AUTO W/O SCOPE: CPT | Performed by: FAMILY MEDICINE

## 2023-05-30 PROCEDURE — 3008F BODY MASS INDEX DOCD: CPT | Performed by: FAMILY MEDICINE

## 2023-05-30 PROCEDURE — 3079F DIAST BP 80-89 MM HG: CPT | Performed by: FAMILY MEDICINE

## 2023-05-30 PROCEDURE — 99214 OFFICE O/P EST MOD 30 MIN: CPT | Performed by: FAMILY MEDICINE

## 2023-06-04 ENCOUNTER — PATIENT MESSAGE (OUTPATIENT)
Dept: FAMILY MEDICINE CLINIC | Facility: CLINIC | Age: 53
End: 2023-06-04

## 2023-06-05 ENCOUNTER — OFFICE VISIT (OUTPATIENT)
Dept: FAMILY MEDICINE CLINIC | Facility: CLINIC | Age: 53
End: 2023-06-05
Payer: COMMERCIAL

## 2023-06-05 ENCOUNTER — TELEPHONE (OUTPATIENT)
Dept: FAMILY MEDICINE CLINIC | Facility: CLINIC | Age: 53
End: 2023-06-05

## 2023-06-05 VITALS
RESPIRATION RATE: 12 BRPM | DIASTOLIC BLOOD PRESSURE: 80 MMHG | SYSTOLIC BLOOD PRESSURE: 120 MMHG | HEART RATE: 89 BPM | OXYGEN SATURATION: 96 % | TEMPERATURE: 97 F

## 2023-06-05 DIAGNOSIS — N41.1 CHRONIC PROSTATITIS: Primary | ICD-10-CM

## 2023-06-05 PROCEDURE — 99213 OFFICE O/P EST LOW 20 MIN: CPT | Performed by: FAMILY MEDICINE

## 2023-06-05 PROCEDURE — 3079F DIAST BP 80-89 MM HG: CPT | Performed by: FAMILY MEDICINE

## 2023-06-05 PROCEDURE — 3074F SYST BP LT 130 MM HG: CPT | Performed by: FAMILY MEDICINE

## 2023-06-05 RX ORDER — AMOXICILLIN AND CLAVULANATE POTASSIUM 875; 125 MG/1; MG/1
1 TABLET, FILM COATED ORAL 2 TIMES DAILY
Qty: 28 TABLET | Refills: 0 | Status: SHIPPED | OUTPATIENT
Start: 2023-06-05 | End: 2023-06-19

## 2023-06-05 RX ORDER — PREDNISONE 20 MG/1
TABLET ORAL
Qty: 15 TABLET | Refills: 0 | Status: SHIPPED | OUTPATIENT
Start: 2023-06-05 | End: 2023-06-15

## 2023-06-05 RX ORDER — HYDROCODONE BITARTRATE AND ACETAMINOPHEN 5; 325 MG/1; MG/1
1 TABLET ORAL 2 TIMES DAILY PRN
Qty: 14 TABLET | Refills: 0 | Status: SHIPPED | OUTPATIENT
Start: 2023-06-05 | End: 2023-06-12

## 2023-06-05 NOTE — TELEPHONE ENCOUNTER
Pt states pharmacy did not received order for prednisone. Advised that we have confirmation info but he is asking to call the medication in again.

## 2023-06-05 NOTE — TELEPHONE ENCOUNTER
Phone call to [facility]. States that they do have script for Prednisone. They are unsure why patient was told that they did not. Patient aware.

## 2023-06-20 RX ORDER — ALPRAZOLAM 0.5 MG/1
0.5 TABLET ORAL NIGHTLY PRN
Qty: 15 TABLET | Refills: 0 | Status: SHIPPED | OUTPATIENT
Start: 2023-06-20

## 2023-06-20 NOTE — TELEPHONE ENCOUNTER
LOV: 6/5/23  LAST LAB: 5/19/23  LAST RX:  ALPRAZOLAM 0.5 MG Oral Tab 15 tablet 0 5/22/2023    Sig: Luann Mendoza 1 TABLET(0.5 MG) BY MOUTH EVERY NIGHT AS NEEDED FOR SLEEP     Next OV:No future appointments.    Dillon Montero

## 2023-06-30 ENCOUNTER — TELEPHONE (OUTPATIENT)
Dept: FAMILY MEDICINE CLINIC | Facility: CLINIC | Age: 53
End: 2023-06-30

## 2023-07-28 RX ORDER — ALPRAZOLAM 0.5 MG/1
0.5 TABLET ORAL NIGHTLY PRN
Qty: 15 TABLET | Refills: 0 | Status: SHIPPED | OUTPATIENT
Start: 2023-07-28

## 2023-07-28 NOTE — TELEPHONE ENCOUNTER
Body Location Override (Optional - Billing Will Still Be Based On Selected Body Map Location If Applicable): Right forearm Electronically submitted to pharmacy Detail Level: Simple Add 18750 Cpt? (Important Note: In 2017 The Use Of 19979 Is Being Tracked By Cms To Determine Future Global Period Reimbursement For Global Periods): yes

## 2023-07-28 NOTE — TELEPHONE ENCOUNTER
ALPRAZolam 0.5 MG Oral Tab     Last office visit:  6/5/23    No future appointments.   Last filled:  6/20/23  #15 with 0   Last labs:

## 2023-08-28 RX ORDER — ALPRAZOLAM 0.5 MG/1
0.5 TABLET ORAL NIGHTLY PRN
Qty: 15 TABLET | Refills: 0 | Status: SHIPPED | OUTPATIENT
Start: 2023-08-28

## 2023-08-28 NOTE — TELEPHONE ENCOUNTER
Requested Prescriptions     Pending Prescriptions Disp Refills    ALPRAZolam 0.5 MG Oral Tab 15 tablet 0     Sig: Take 1 tablet (0.5 mg total) by mouth nightly as needed. Last refill 7/28/23 #15  LOV 6/5/23  No future appointments.

## 2023-09-06 ENCOUNTER — OFFICE VISIT (OUTPATIENT)
Dept: FAMILY MEDICINE CLINIC | Facility: CLINIC | Age: 53
End: 2023-09-06
Payer: COMMERCIAL

## 2023-09-06 ENCOUNTER — LABORATORY ENCOUNTER (OUTPATIENT)
Dept: LAB | Age: 53
End: 2023-09-06
Payer: COMMERCIAL

## 2023-09-06 VITALS
OXYGEN SATURATION: 98 % | HEIGHT: 74 IN | BODY MASS INDEX: 40.43 KG/M2 | DIASTOLIC BLOOD PRESSURE: 100 MMHG | HEART RATE: 70 BPM | SYSTOLIC BLOOD PRESSURE: 140 MMHG | TEMPERATURE: 97 F | WEIGHT: 315 LBS

## 2023-09-06 DIAGNOSIS — Z01.818 PRE-OP EXAM: Primary | ICD-10-CM

## 2023-09-06 DIAGNOSIS — Z01.818 PRE-OP EXAM: ICD-10-CM

## 2023-09-06 DIAGNOSIS — I10 PRIMARY HYPERTENSION: ICD-10-CM

## 2023-09-06 PROBLEM — N39.0 RECURRENT UTI: Status: ACTIVE | Noted: 2023-06-30

## 2023-09-06 PROBLEM — N35.919 URETHRAL STRICTURE: Status: ACTIVE | Noted: 2023-06-30

## 2023-09-06 LAB
ALBUMIN SERPL-MCNC: 3.9 G/DL (ref 3.4–5)
ALBUMIN/GLOB SERPL: 1.1 {RATIO} (ref 1–2)
ALP LIVER SERPL-CCNC: 81 U/L
ALT SERPL-CCNC: 36 U/L
ANION GAP SERPL CALC-SCNC: 5 MMOL/L (ref 0–18)
AST SERPL-CCNC: 15 U/L (ref 15–37)
ATRIAL RATE: 64 BPM
BASOPHILS # BLD AUTO: 0.07 X10(3) UL (ref 0–0.2)
BASOPHILS NFR BLD AUTO: 1.3 %
BILIRUB SERPL-MCNC: 0.5 MG/DL (ref 0.1–2)
BUN BLD-MCNC: 17 MG/DL (ref 7–18)
CALCIUM BLD-MCNC: 8.9 MG/DL (ref 8.5–10.1)
CHLORIDE SERPL-SCNC: 105 MMOL/L (ref 98–112)
CO2 SERPL-SCNC: 28 MMOL/L (ref 21–32)
CREAT BLD-MCNC: 1.11 MG/DL
EGFRCR SERPLBLD CKD-EPI 2021: 80 ML/MIN/1.73M2 (ref 60–?)
EOSINOPHIL # BLD AUTO: 0.28 X10(3) UL (ref 0–0.7)
EOSINOPHIL NFR BLD AUTO: 5.1 %
ERYTHROCYTE [DISTWIDTH] IN BLOOD BY AUTOMATED COUNT: 14.6 %
FASTING STATUS PATIENT QL REPORTED: NO
GLOBULIN PLAS-MCNC: 3.7 G/DL (ref 2.8–4.4)
GLUCOSE BLD-MCNC: 94 MG/DL (ref 70–99)
HCT VFR BLD AUTO: 46.9 %
HGB BLD-MCNC: 14.4 G/DL
IMM GRANULOCYTES # BLD AUTO: 0.02 X10(3) UL (ref 0–1)
IMM GRANULOCYTES NFR BLD: 0.4 %
LYMPHOCYTES # BLD AUTO: 1.84 X10(3) UL (ref 1–4)
LYMPHOCYTES NFR BLD AUTO: 33.3 %
MCH RBC QN AUTO: 28.6 PG (ref 26–34)
MCHC RBC AUTO-ENTMCNC: 30.7 G/DL (ref 31–37)
MCV RBC AUTO: 93.1 FL
MONOCYTES # BLD AUTO: 0.47 X10(3) UL (ref 0.1–1)
MONOCYTES NFR BLD AUTO: 8.5 %
NEUTROPHILS # BLD AUTO: 2.85 X10 (3) UL (ref 1.5–7.7)
NEUTROPHILS # BLD AUTO: 2.85 X10(3) UL (ref 1.5–7.7)
NEUTROPHILS NFR BLD AUTO: 51.4 %
OSMOLALITY SERPL CALC.SUM OF ELEC: 287 MOSM/KG (ref 275–295)
P AXIS: 25 DEGREES
P-R INTERVAL: 172 MS
PLATELET # BLD AUTO: 238 10(3)UL (ref 150–450)
POTASSIUM SERPL-SCNC: 4.2 MMOL/L (ref 3.5–5.1)
PROT SERPL-MCNC: 7.6 G/DL (ref 6.4–8.2)
Q-T INTERVAL: 404 MS
QRS DURATION: 84 MS
QTC CALCULATION (BEZET): 416 MS
R AXIS: -11 DEGREES
RBC # BLD AUTO: 5.04 X10(6)UL
SODIUM SERPL-SCNC: 138 MMOL/L (ref 136–145)
T AXIS: 44 DEGREES
VENTRICULAR RATE: 64 BPM
WBC # BLD AUTO: 5.5 X10(3) UL (ref 4–11)

## 2023-09-06 PROCEDURE — 93000 ELECTROCARDIOGRAM COMPLETE: CPT

## 2023-09-06 PROCEDURE — 85025 COMPLETE CBC W/AUTO DIFF WBC: CPT

## 2023-09-06 PROCEDURE — 80053 COMPREHEN METABOLIC PANEL: CPT

## 2023-09-06 PROCEDURE — 3008F BODY MASS INDEX DOCD: CPT

## 2023-09-06 PROCEDURE — 99214 OFFICE O/P EST MOD 30 MIN: CPT

## 2023-09-06 PROCEDURE — 36415 COLL VENOUS BLD VENIPUNCTURE: CPT

## 2023-09-06 PROCEDURE — 3077F SYST BP >= 140 MM HG: CPT

## 2023-09-06 PROCEDURE — 3080F DIAST BP >= 90 MM HG: CPT

## 2023-09-06 RX ORDER — BETAMETHASONE DIPROPIONATE 0.05 %
OINTMENT (GRAM) TOPICAL
COMMUNITY
Start: 2023-07-24

## 2023-09-06 RX ORDER — CEPHALEXIN 500 MG/1
CAPSULE ORAL
COMMUNITY
Start: 2023-07-28 | End: 2023-09-06 | Stop reason: ALTCHOICE

## 2023-09-07 ENCOUNTER — TELEPHONE (OUTPATIENT)
Dept: FAMILY MEDICINE CLINIC | Facility: CLINIC | Age: 53
End: 2023-09-07

## 2023-09-07 NOTE — TELEPHONE ENCOUNTER
Sent pre-op notes, EKG and labs to Dr Eliza Wolff at [phone] for Luis Alberto's upcoming surgery on 9/12/23

## 2023-10-03 DIAGNOSIS — I10 PRIMARY HYPERTENSION: ICD-10-CM

## 2023-10-03 RX ORDER — ALPRAZOLAM 0.5 MG/1
0.5 TABLET ORAL NIGHTLY PRN
Qty: 15 TABLET | Refills: 0 | Status: SHIPPED | OUTPATIENT
Start: 2023-10-03

## 2023-10-03 NOTE — TELEPHONE ENCOUNTER
Last refill: 08/28/23  qtY: 15 tabs  W/ 0 refills  Last ov 06/05/23    Requested Prescriptions     Pending Prescriptions Disp Refills    ALPRAZolam 0.5 MG Oral Tab 15 tablet 0     Sig: Take 1 tablet (0.5 mg total) by mouth nightly as needed. No future appointments.

## 2023-10-03 NOTE — TELEPHONE ENCOUNTER
Metoprolol Succinate 25 MG oral capsule ER 24 Hr    Hypertension Medications Protocol Dftlzo19/03/2023 08:17 AM   Protocol Details CMP or BMP in past 12 months    Last serum creatinine< 2.0    Appointment in past 6 or next 3 months        Last office visit:  6/16/22  No future appointments. Last filled:  9/6/23  #30 with 0 refills   Last labs:  9/6/23  Crea:  1.11      Called Luis Alberto and JULIÁN for him to call back due for a physical, last physical was 6/16/22, will send 30 days to get him enough time to get in to see pcp.

## 2023-10-31 RX ORDER — ALPRAZOLAM 0.5 MG/1
0.5 TABLET ORAL NIGHTLY PRN
Qty: 15 TABLET | Refills: 0 | Status: SHIPPED | OUTPATIENT
Start: 2023-10-31

## 2023-10-31 NOTE — TELEPHONE ENCOUNTER
Last refill: 10/03/23  Qty: 15 tabs  W/ 0 refills  Last ov: 06/05/23    Requested Prescriptions     Pending Prescriptions Disp Refills    ALPRAZolam 0.5 MG Oral Tab 15 tablet 0     Sig: Take 1 tablet (0.5 mg total) by mouth nightly as needed. No future appointments.

## 2023-11-11 DIAGNOSIS — I10 PRIMARY HYPERTENSION: ICD-10-CM

## 2023-11-13 RX ORDER — METOPROLOL SUCCINATE 25 MG/1
1 CAPSULE, EXTENDED RELEASE ORAL DAILY
Qty: 30 CAPSULE | Refills: 1 | Status: SHIPPED | OUTPATIENT
Start: 2023-11-13

## 2023-11-13 NOTE — TELEPHONE ENCOUNTER
Requested Prescriptions     Pending Prescriptions Disp Refills    KAPSPARGO SPRINKLE 25 MG Oral Capsule ER 24 Hour Sprinkle [Pharmacy Med Name: Tyler Number SPRINKLE ER 25MG CAPSULES] 30 capsule 0     Sig: TAKE 1 CAPSULE BY MOUTH DAILY     Last refill 10/3/23 #30  LOV 9/6/23  No future appointments.   Last labs 9/6/23

## 2023-12-01 ENCOUNTER — LABORATORY ENCOUNTER (OUTPATIENT)
Dept: LAB | Age: 53
End: 2023-12-01
Payer: COMMERCIAL

## 2023-12-01 ENCOUNTER — OFFICE VISIT (OUTPATIENT)
Dept: FAMILY MEDICINE CLINIC | Facility: CLINIC | Age: 53
End: 2023-12-01
Payer: COMMERCIAL

## 2023-12-01 VITALS
OXYGEN SATURATION: 96 % | HEIGHT: 74 IN | HEART RATE: 77 BPM | WEIGHT: 311 LBS | TEMPERATURE: 98 F | RESPIRATION RATE: 18 BRPM | BODY MASS INDEX: 39.91 KG/M2 | SYSTOLIC BLOOD PRESSURE: 132 MMHG | DIASTOLIC BLOOD PRESSURE: 84 MMHG

## 2023-12-01 DIAGNOSIS — Z01.818 PREOP EXAMINATION: ICD-10-CM

## 2023-12-01 DIAGNOSIS — N35.914 STRICTURE OF ANTERIOR URETHRA IN MALE, UNSPECIFIED STRICTURE TYPE: ICD-10-CM

## 2023-12-01 DIAGNOSIS — Z01.818 PREOP EXAMINATION: Primary | ICD-10-CM

## 2023-12-01 LAB
ALBUMIN SERPL-MCNC: 4.1 G/DL (ref 3.4–5)
ALBUMIN/GLOB SERPL: 1.2 {RATIO} (ref 1–2)
ALP LIVER SERPL-CCNC: 81 U/L
ALT SERPL-CCNC: 41 U/L
ANION GAP SERPL CALC-SCNC: 4 MMOL/L (ref 0–18)
AST SERPL-CCNC: 20 U/L (ref 15–37)
BASOPHILS # BLD AUTO: 0.07 X10(3) UL (ref 0–0.2)
BASOPHILS NFR BLD AUTO: 1.2 %
BILIRUB SERPL-MCNC: 0.6 MG/DL (ref 0.1–2)
BILIRUB UR QL STRIP.AUTO: NEGATIVE
BUN BLD-MCNC: 17 MG/DL (ref 9–23)
CALCIUM BLD-MCNC: 9.2 MG/DL (ref 8.5–10.1)
CHLORIDE SERPL-SCNC: 109 MMOL/L (ref 98–112)
CO2 SERPL-SCNC: 27 MMOL/L (ref 21–32)
CREAT BLD-MCNC: 1.1 MG/DL
EGFRCR SERPLBLD CKD-EPI 2021: 81 ML/MIN/1.73M2 (ref 60–?)
EOSINOPHIL # BLD AUTO: 0.28 X10(3) UL (ref 0–0.7)
EOSINOPHIL NFR BLD AUTO: 4.7 %
ERYTHROCYTE [DISTWIDTH] IN BLOOD BY AUTOMATED COUNT: 13.7 %
FASTING STATUS PATIENT QL REPORTED: NO
GLOBULIN PLAS-MCNC: 3.5 G/DL (ref 2.8–4.4)
GLUCOSE BLD-MCNC: 121 MG/DL (ref 70–99)
GLUCOSE UR STRIP.AUTO-MCNC: NORMAL MG/DL
HCT VFR BLD AUTO: 47.4 %
HGB BLD-MCNC: 15 G/DL
HYALINE CASTS #/AREA URNS AUTO: PRESENT /LPF
IMM GRANULOCYTES # BLD AUTO: 0.02 X10(3) UL (ref 0–1)
IMM GRANULOCYTES NFR BLD: 0.3 %
KETONES UR STRIP.AUTO-MCNC: NEGATIVE MG/DL
LEUKOCYTE ESTERASE UR QL STRIP.AUTO: 500
LYMPHOCYTES # BLD AUTO: 1.51 X10(3) UL (ref 1–4)
LYMPHOCYTES NFR BLD AUTO: 25.5 %
MCH RBC QN AUTO: 29.4 PG (ref 26–34)
MCHC RBC AUTO-ENTMCNC: 31.6 G/DL (ref 31–37)
MCV RBC AUTO: 92.9 FL
MONOCYTES # BLD AUTO: 0.52 X10(3) UL (ref 0.1–1)
MONOCYTES NFR BLD AUTO: 8.8 %
NEUTROPHILS # BLD AUTO: 3.51 X10 (3) UL (ref 1.5–7.7)
NEUTROPHILS # BLD AUTO: 3.51 X10(3) UL (ref 1.5–7.7)
NEUTROPHILS NFR BLD AUTO: 59.5 %
NITRITE UR QL STRIP.AUTO: NEGATIVE
OSMOLALITY SERPL CALC.SUM OF ELEC: 293 MOSM/KG (ref 275–295)
PH UR STRIP.AUTO: 5.5 [PH] (ref 5–8)
PLATELET # BLD AUTO: 245 10(3)UL (ref 150–450)
POTASSIUM SERPL-SCNC: 4.6 MMOL/L (ref 3.5–5.1)
PROT SERPL-MCNC: 7.6 G/DL (ref 6.4–8.2)
PROT UR STRIP.AUTO-MCNC: NEGATIVE MG/DL
RBC # BLD AUTO: 5.1 X10(6)UL
RBC UR QL AUTO: NEGATIVE
SODIUM SERPL-SCNC: 140 MMOL/L (ref 136–145)
SP GR UR STRIP.AUTO: 1.02 (ref 1–1.03)
UROBILINOGEN UR STRIP.AUTO-MCNC: NORMAL MG/DL
WBC # BLD AUTO: 5.9 X10(3) UL (ref 4–11)
WBC #/AREA URNS AUTO: >50 /HPF

## 2023-12-01 PROCEDURE — 99214 OFFICE O/P EST MOD 30 MIN: CPT

## 2023-12-01 PROCEDURE — 3079F DIAST BP 80-89 MM HG: CPT

## 2023-12-01 PROCEDURE — 87086 URINE CULTURE/COLONY COUNT: CPT

## 2023-12-01 PROCEDURE — 87077 CULTURE AEROBIC IDENTIFY: CPT

## 2023-12-01 PROCEDURE — 3008F BODY MASS INDEX DOCD: CPT

## 2023-12-01 PROCEDURE — 87186 SC STD MICRODIL/AGAR DIL: CPT

## 2023-12-01 PROCEDURE — 36415 COLL VENOUS BLD VENIPUNCTURE: CPT

## 2023-12-01 PROCEDURE — 80053 COMPREHEN METABOLIC PANEL: CPT

## 2023-12-01 PROCEDURE — 3075F SYST BP GE 130 - 139MM HG: CPT

## 2023-12-01 PROCEDURE — 81001 URINALYSIS AUTO W/SCOPE: CPT

## 2023-12-01 PROCEDURE — 85025 COMPLETE CBC W/AUTO DIFF WBC: CPT

## 2023-12-01 PROCEDURE — 93000 ELECTROCARDIOGRAM COMPLETE: CPT

## 2023-12-04 DIAGNOSIS — N39.0 URINARY TRACT INFECTION WITHOUT HEMATURIA, SITE UNSPECIFIED: Primary | ICD-10-CM

## 2023-12-04 RX ORDER — ALPRAZOLAM 0.5 MG/1
0.5 TABLET ORAL NIGHTLY PRN
Qty: 15 TABLET | Refills: 1 | Status: SHIPPED | OUTPATIENT
Start: 2023-12-04

## 2023-12-04 RX ORDER — SULFAMETHOXAZOLE AND TRIMETHOPRIM 800; 160 MG/1; MG/1
1 TABLET ORAL 2 TIMES DAILY
Qty: 20 TABLET | Refills: 0 | Status: SHIPPED | OUTPATIENT
Start: 2023-12-04 | End: 2023-12-14

## 2023-12-04 NOTE — TELEPHONE ENCOUNTER
OV 06/05/23  REFILL 10/31 #15    Requested Prescriptions     Pending Prescriptions Disp Refills    ALPRAZolam 0.5 MG Oral Tab 15 tablet 0     Sig: Take 1 tablet (0.5 mg total) by mouth nightly as needed. No future appointments.

## 2023-12-05 LAB
ATRIAL RATE: 73 BPM
P AXIS: 43 DEGREES
P-R INTERVAL: 168 MS
Q-T INTERVAL: 382 MS
QRS DURATION: 82 MS
QTC CALCULATION (BEZET): 420 MS
R AXIS: 2 DEGREES
T AXIS: 70 DEGREES
VENTRICULAR RATE: 73 BPM

## 2023-12-30 RX ORDER — ALPRAZOLAM 0.5 MG/1
0.5 TABLET ORAL NIGHTLY PRN
Qty: 15 TABLET | Refills: 1 | Status: SHIPPED | OUTPATIENT
Start: 2023-12-30

## 2023-12-30 NOTE — TELEPHONE ENCOUNTER
Last refill: 12/04/23  Qty: 15 tabs  W/ 1 refills  Last ov 06/05/23    Requested Prescriptions     Pending Prescriptions Disp Refills    ALPRAZolam 0.5 MG Oral Tab 15 tablet 1     Sig: Take 1 tablet (0.5 mg total) by mouth nightly as needed. No future appointments.

## 2024-01-15 ENCOUNTER — LABORATORY ENCOUNTER (OUTPATIENT)
Dept: LAB | Age: 54
End: 2024-01-15
Payer: COMMERCIAL

## 2024-01-15 ENCOUNTER — OFFICE VISIT (OUTPATIENT)
Dept: FAMILY MEDICINE CLINIC | Facility: CLINIC | Age: 54
End: 2024-01-15
Payer: COMMERCIAL

## 2024-01-15 VITALS
BODY MASS INDEX: 40.43 KG/M2 | HEIGHT: 74 IN | WEIGHT: 315 LBS | OXYGEN SATURATION: 99 % | DIASTOLIC BLOOD PRESSURE: 100 MMHG | HEART RATE: 88 BPM | TEMPERATURE: 99 F | SYSTOLIC BLOOD PRESSURE: 150 MMHG

## 2024-01-15 DIAGNOSIS — E66.01 MORBID OBESITY (HCC): ICD-10-CM

## 2024-01-15 DIAGNOSIS — Z13.220 LIPID SCREENING: ICD-10-CM

## 2024-01-15 DIAGNOSIS — Z00.00 WELLNESS EXAMINATION: ICD-10-CM

## 2024-01-15 DIAGNOSIS — Z12.5 PROSTATE CANCER SCREENING: ICD-10-CM

## 2024-01-15 DIAGNOSIS — L30.9 ECZEMA, UNSPECIFIED TYPE: ICD-10-CM

## 2024-01-15 DIAGNOSIS — E78.00 HYPERCHOLESTEROLEMIA: ICD-10-CM

## 2024-01-15 DIAGNOSIS — Z00.00 WELLNESS EXAMINATION: Primary | ICD-10-CM

## 2024-01-15 DIAGNOSIS — Z12.11 SCREENING FOR COLON CANCER: ICD-10-CM

## 2024-01-15 LAB
CHOLEST SERPL-MCNC: 217 MG/DL (ref ?–200)
COMPLEXED PSA SERPL-MCNC: 1.24 NG/ML (ref ?–4)
FASTING PATIENT LIPID ANSWER: NO
HDLC SERPL-MCNC: 37 MG/DL (ref 40–59)
LDLC SERPL CALC-MCNC: 140 MG/DL (ref ?–100)
NONHDLC SERPL-MCNC: 180 MG/DL (ref ?–130)
TRIGL SERPL-MCNC: 219 MG/DL (ref 30–149)
VLDLC SERPL CALC-MCNC: 41 MG/DL (ref 0–30)

## 2024-01-15 PROCEDURE — 3008F BODY MASS INDEX DOCD: CPT

## 2024-01-15 PROCEDURE — 36415 COLL VENOUS BLD VENIPUNCTURE: CPT

## 2024-01-15 PROCEDURE — 99396 PREV VISIT EST AGE 40-64: CPT

## 2024-01-15 PROCEDURE — 80061 LIPID PANEL: CPT

## 2024-01-15 PROCEDURE — 3080F DIAST BP >= 90 MM HG: CPT

## 2024-01-15 PROCEDURE — 83036 HEMOGLOBIN GLYCOSYLATED A1C: CPT

## 2024-01-15 PROCEDURE — 3077F SYST BP >= 140 MM HG: CPT

## 2024-01-15 RX ORDER — AMOXICILLIN 500 MG/1
1 CAPSULE ORAL 3 TIMES DAILY
COMMUNITY
Start: 2024-01-15 | End: 2024-01-22

## 2024-01-15 RX ORDER — TIRZEPATIDE 2.5 MG/.5ML
1 INJECTION, SOLUTION SUBCUTANEOUS WEEKLY
Qty: 3 ML | Refills: 0 | Status: SHIPPED | OUTPATIENT
Start: 2024-01-15 | End: 2024-02-14

## 2024-01-15 RX ORDER — OXYCODONE HYDROCHLORIDE 5 MG/1
1 TABLET ORAL EVERY 4 HOURS PRN
COMMUNITY
Start: 2023-12-13 | End: 2024-01-15 | Stop reason: ALTCHOICE

## 2024-01-15 RX ORDER — OXYBUTYNIN CHLORIDE 5 MG/1
5 TABLET, EXTENDED RELEASE ORAL DAILY
COMMUNITY
Start: 2023-12-12 | End: 2024-01-15 | Stop reason: ALTCHOICE

## 2024-01-15 RX ORDER — CIPROFLOXACIN 500 MG/1
TABLET, FILM COATED ORAL
COMMUNITY
Start: 2023-12-17 | End: 2024-01-15 | Stop reason: ALTCHOICE

## 2024-01-15 NOTE — PROGRESS NOTES
HPI:   Luis Alberto Craig is a 53 year old male who presents for an Annual Health Visit.   He needs a physical for fostering.  He will be fostering a 5 year old family member with his wife.  He also has questions regarding weight loss and a red itchy patch of skin to the inner corner of his right eye.        Allergies:   No Known Allergies    CURRENT MEDICATIONS   Current Outpatient Medications   Medication Sig Dispense Refill    ciprofloxacin 500 MG Oral Tab TAKE 1 TABLET BY MOUTH TWICE DAILY. BEGIN EVENING BEFORE CATHETER REMOVAL AND. CONTINUE TO USE AS DIRECTED      oxybutynin ER 5 MG Oral Tablet 24 Hr Take 1 tablet (5 mg total) by mouth daily.      ALPRAZolam 0.5 MG Oral Tab Take 1 tablet (0.5 mg total) by mouth nightly as needed. 15 tablet 1    Metoprolol Succinate (KAPSPARGO SPRINKLE) 25 MG Oral Capsule ER 24 Hour Sprinkle Take 1 capsule by mouth daily. 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 Leonardo Zhang at [facility] following multiple episodes of urinary retention and UTIs due to persistent urethral strictures    Lumbar discitis 12/2020    Developed osteomyelitis, had to have a abscess drained by Dr. Blanco, treated with IV antibiotics under the direction of Dr. Al      Past Surgical History:   Procedure Laterality Date    CYSTOSCOPY,DIL URETHRAL STRICTURE        Family History   Problem Relation Age of Onset    Diabetes Father     Heart Disorder Father     Hypertension Father     Other (Other) Father       SOCIAL HISTORY   Social History     Socioeconomic History    Marital status:    Occupational History     Employer: [employer]   Tobacco Use     Smoking status: Never    Smokeless tobacco: Former     Quit date: 6/30/2014   Vaping Use    Vaping Use: Never used   Substance and Sexual Activity    Alcohol use: Not Currently     Comment: OCCASIONAL-WEEKEND    Drug use: No     Social History     Social History Narrative    Not on file        REVIEW OF SYSTEMS:     Review of Systems   Constitutional:  Negative for activity change, appetite change and fatigue.   HENT:  Negative for congestion, hearing loss and sore throat.    Eyes:  Negative for discharge and redness.   Respiratory:  Negative for shortness of breath and wheezing.    Cardiovascular:  Negative for chest pain.   Gastrointestinal:  Negative for abdominal distention and abdominal pain.   Endocrine: Negative for cold intolerance and heat intolerance.   Genitourinary:  Negative for difficulty urinating and urgency.   Musculoskeletal:  Negative for arthralgias and back pain.   Skin:  Positive for color change (to right inner eye).   Allergic/Immunologic: Negative for environmental allergies.   Neurological:  Negative for dizziness, syncope and headaches.   Hematological:  Negative for adenopathy. Does not bruise/bleed easily.   Psychiatric/Behavioral:  Negative for agitation, behavioral problems and confusion.        EXAM:   There were no vitals taken for this visit.   Wt Readings from Last 6 Encounters:   12/01/23 (!) 311 lb (141.1 kg)   09/06/23 (!) 317 lb 3.2 oz (143.9 kg)   05/30/23 (!) 306 lb (138.8 kg)   05/22/23 (!) 310 lb (140.6 kg)   12/13/22 (!) 323 lb (146.5 kg)   11/01/22 (!) 323 lb (146.5 kg)     There is no height or weight on file to calculate BMI.    Physical Exam  Constitutional:       Appearance: Normal appearance. He is obese.   HENT:      Head: Normocephalic.      Right Ear: Tympanic membrane normal.      Left Ear: Tympanic membrane normal.      Nose: Nose normal.      Mouth/Throat:      Mouth: Mucous membranes are moist.      Pharynx: Posterior oropharyngeal erythema present.   Eyes:       Pupils: Pupils are equal, round, and reactive to light.   Cardiovascular:      Rate and Rhythm: Normal rate and regular rhythm.      Pulses: Normal pulses.      Heart sounds: Normal heart sounds.   Pulmonary:      Effort: Pulmonary effort is normal.      Breath sounds: Normal breath sounds.   Abdominal:      General: Bowel sounds are normal.      Palpations: Abdomen is soft.   Musculoskeletal:         General: Normal range of motion.      Cervical back: Normal range of motion.   Skin:     General: Skin is dry.      Capillary Refill: Capillary refill takes less than 2 seconds.   Neurological:      Mental Status: He is alert and oriented to person, place, and time.   Psychiatric:         Mood and Affect: Mood normal.         Behavior: Behavior normal.          ASSESSMENT AND PLAN:   Luis Alberto was seen today for physical, eye problem and weight loss.    Diagnoses and all orders for this visit:    Wellness examination  -     Lipid Panel; Future  -     Hemoglobin A1C [E]; Future    Eczema, unspecified type  Comments:  recommended Aquaphor or vasaline use    Morbid obesity (HCC)  -     Cancel: [employer] [facility]  -     Tirzepatide (MOUNJARO) 2.5 MG/0.5ML Subcutaneous Solution Pen-injector; Inject 1 each into the skin once a week.  -     [employer] [facility]    Lipid screening  -     Lipid Panel; Future    Prostate cancer screening  -     PSA Screen; Future    Screening for colon cancer  -     Referral to [facility] for Colonoscopy **Select a Doc to add to AVS**        The patient indicates understanding of these issues and agrees to the plan.    Problem List:  Patient Active Problem List   Diagnosis    Cervical radiculopathy    Adrenal nodule (HCC)    Recurrent UTI    Urethral stricture       Halle Bowen, NEERU  1/15/2024  2:20 PM

## 2024-01-16 DIAGNOSIS — E78.00 HYPERCHOLESTEROLEMIA: Primary | ICD-10-CM

## 2024-01-16 LAB
EST. AVERAGE GLUCOSE BLD GHB EST-MCNC: 117 MG/DL (ref 68–126)
HBA1C MFR BLD: 5.7 % (ref ?–5.7)

## 2024-01-16 RX ORDER — ATORVASTATIN CALCIUM 10 MG/1
10 TABLET, FILM COATED ORAL DAILY
Qty: 90 TABLET | Refills: 0 | Status: SHIPPED | OUTPATIENT
Start: 2024-01-16 | End: 2024-04-15

## 2024-01-23 ENCOUNTER — TELEPHONE (OUTPATIENT)
Dept: FAMILY MEDICINE CLINIC | Facility: CLINIC | Age: 54
End: 2024-01-23

## 2024-01-23 NOTE — TELEPHONE ENCOUNTER
Sent Halle SIDDIQI a message and she said that he will need to go to the [facility] she already has a referral in for them.   Will send Sanchez and let him know that the medication was denied and that she would recommend that he follow up with the [facility] Loss Clinic.

## 2024-01-23 NOTE — TELEPHONE ENCOUNTER
Called to start the appeal for the Monjaro denied due to patient not being Type 2 DM.  Spoke with Jluio and we went over the medications to see if Wegovy, Zepbound or Ozempic would be covered.  Julio said that Wegovy and Zepboud are plan excluded and that Ozempic would need a PA and they would have to be on Metformin and it not work.  Speaking to Halle SIDDIQI to see how she would like to proceed.

## 2024-01-23 NOTE — TELEPHONE ENCOUNTER
I saw Kwesi's [employer] message and called him back.  He set up an appt with Mel Guillen with the weight loss clinic on 7/3/24 at 8:20 am.  Kwesi also said as of February his insurance will be changing to BCBS and that if he looked correctly Wegovy and Zepbound should be covered.  I told him when he gets the new card to send a [employer] note and we could try again but to keep the appt with the weight loss clinic.

## 2024-01-25 ENCOUNTER — PATIENT MESSAGE (OUTPATIENT)
Dept: FAMILY MEDICINE CLINIC | Facility: CLINIC | Age: 54
End: 2024-01-25

## 2024-01-25 DIAGNOSIS — I10 PRIMARY HYPERTENSION: Primary | ICD-10-CM

## 2024-01-25 DIAGNOSIS — E66.01 MORBID OBESITY (HCC): ICD-10-CM

## 2024-01-26 DIAGNOSIS — I10 PRIMARY HYPERTENSION: ICD-10-CM

## 2024-01-26 RX ORDER — LISINOPRIL 10 MG/1
10 TABLET ORAL DAILY
Qty: 30 TABLET | Refills: 0 | Status: SHIPPED | OUTPATIENT
Start: 2024-01-26

## 2024-01-26 RX ORDER — LISINOPRIL 10 MG/1
10 TABLET ORAL DAILY
Qty: 90 TABLET | Refills: 0 | OUTPATIENT
Start: 2024-01-26

## 2024-01-26 RX ORDER — LISINOPRIL 10 MG/1
10 TABLET ORAL DAILY
Qty: 30 TABLET | Refills: 0 | Status: SHIPPED | OUTPATIENT
Start: 2024-01-26 | End: 2024-01-26

## 2024-01-26 NOTE — TELEPHONE ENCOUNTER
LISINOPRIL 10 MG Oral Tab     Hypertension Medications Protocol Lvuioz5901/26/2024 07:55 AM   Protocol Details CMP or BMP in past 12 months    Last serum creatinine< 2.0    Appointment in past 6 or next 3 months      Last office visit:  1/15/24  Future Appointments   Date Time Provider Department Center   7/3/2024  8:20 AM Mel Guillen APRN [facility]   Last filled:  1/25/23  #30 with 0 refills   Last labs:  12/13/23  Crea:1.05

## 2024-01-26 NOTE — TELEPHONE ENCOUNTER
Dustin Sahu.    Thank you for sending me your blood pressure log.  Your blood pressure readings are on the higher end.  I would to start you on a low dose medication for this and have you follow up in 4 weeks.  This medication you would take once a day.  Please let me know if you have any questions.

## 2024-01-26 NOTE — TELEPHONE ENCOUNTER
From: Luis Alberto Craig  To: Halle Bowen  Sent: 1/25/2024 6:46 PM CST  Subject: blood pressure     Here’s the blood pressure readings you wanted me to record

## 2024-02-05 DIAGNOSIS — E66.01 MORBID OBESITY (HCC): Primary | ICD-10-CM

## 2024-02-09 RX ORDER — TIRZEPATIDE 2.5 MG/.5ML
2.5 INJECTION, SOLUTION SUBCUTANEOUS WEEKLY
Qty: 3 ML | Refills: 0 | Status: SHIPPED | OUTPATIENT
Start: 2024-02-09 | End: 2024-03-08

## 2024-02-20 DIAGNOSIS — I10 PRIMARY HYPERTENSION: ICD-10-CM

## 2024-02-20 RX ORDER — LISINOPRIL 10 MG/1
10 TABLET ORAL DAILY
Qty: 30 TABLET | Refills: 0 | Status: SHIPPED | OUTPATIENT
Start: 2024-02-20 | End: 2024-02-20

## 2024-02-20 NOTE — TELEPHONE ENCOUNTER
Last refill: 01/26/24  qtY: 30  W/  0 refills  Last ov: 06/05/23    Requested Prescriptions     Pending Prescriptions Disp Refills    LISINOPRIL 10 MG Oral Tab [Pharmacy Med Name: LISINOPRIL 10MG TABLETS] 30 tablet 0     Sig: TAKE 1 TABLET(10 MG) BY MOUTH DAILY     Future Appointments   Date Time Provider Department Center   7/3/2024  8:20 AM Mel Guillen APRN [facility]

## 2024-02-21 RX ORDER — LISINOPRIL 10 MG/1
10 TABLET ORAL DAILY
Qty: 30 TABLET | Refills: 1 | Status: SHIPPED | OUTPATIENT
Start: 2024-02-21

## 2024-02-21 NOTE — TELEPHONE ENCOUNTER
LOV:1/15/24      LAST LAB:1/15/24    LAST   lisinopril 10 MG Oral Tab (Discontinued) 30 tablet 0 1/26/2024 2/20/2024   Sig:   TAKE 1 TABLET(10 MG) BY MOUTH DA     RX:    Next OV:   Future Appointments   Date Time Provider Department Center   7/3/2024  8:20 AM Mel Guillen APRN [facility]          PROTOCOL  Hypertension Medications Protocol Obkoyq7602/20/2024 03:45 PM   Protocol Details Last BP reading less than 140/90    CMP or BMP in past 12 months    In person appointment or virtual visit in the past 12 mos or appointment in next 3 mos    EGFRCR or GFRNAA > 50

## 2024-02-22 DIAGNOSIS — I10 PRIMARY HYPERTENSION: ICD-10-CM

## 2024-02-22 NOTE — TELEPHONE ENCOUNTER
Received a fax from pharmacy that a prior auth is needed for patient's Kapspargo Sprinkle 25 mg ER medication.     I spoke with patient and he states there is no reason why he does the brand name Ok to send in for generic metoprolol ER 25? I see that is what he was getting in the past. Patient is ok with this. I just wanted to make sure medically there wasn't a reason.

## 2024-02-23 RX ORDER — TIRZEPATIDE 2.5 MG/.5ML
2.5 INJECTION, SOLUTION SUBCUTANEOUS WEEKLY
Qty: 3 ML | Refills: 0 | Status: SHIPPED | OUTPATIENT
Start: 2024-02-23 | End: 2024-03-22

## 2024-02-23 RX ORDER — METOPROLOL SUCCINATE 25 MG/1
25 TABLET, EXTENDED RELEASE ORAL DAILY
Refills: 0 | Status: CANCELLED | OUTPATIENT
Start: 2024-02-23

## 2024-02-23 NOTE — TELEPHONE ENCOUNTER
Please verify that script can be changed to Metoprolol ER 25 mg tablet form from Kapspargo Capsule ER form?

## 2024-02-24 RX ORDER — METOPROLOL SUCCINATE 25 MG/1
25 TABLET, EXTENDED RELEASE ORAL DAILY
Qty: 90 TABLET | Refills: 1 | Status: SHIPPED | OUTPATIENT
Start: 2024-02-24 | End: 2024-08-22

## 2024-03-12 DIAGNOSIS — G47.00 INSOMNIA, UNSPECIFIED TYPE: Primary | ICD-10-CM

## 2024-03-12 RX ORDER — ALPRAZOLAM 0.5 MG/1
0.5 TABLET ORAL NIGHTLY PRN
Qty: 15 TABLET | Refills: 1 | Status: SHIPPED | OUTPATIENT
Start: 2024-03-12

## 2024-03-12 NOTE — TELEPHONE ENCOUNTER
Controlled Substance Medication Lknqiq1203/12/2024 09:40 AM    This medication is a controlled substance - forward to provider to refill          Last office visit:  01/15/24  Last refill:  12/30/23  #15, 1 refill  Last lab:  01/15/24    Future Appointments   Date Time Provider Department Center   3/18/2024  8:00 AM Halle Bowen APRN [facility] [place]   7/3/2024  8:20 AM Mel Guillen APRN [facility]

## 2024-03-18 ENCOUNTER — OFFICE VISIT (OUTPATIENT)
Dept: FAMILY MEDICINE CLINIC | Facility: CLINIC | Age: 54
End: 2024-03-18
Payer: COMMERCIAL

## 2024-03-18 VITALS
WEIGHT: 308 LBS | OXYGEN SATURATION: 96 % | TEMPERATURE: 98 F | HEIGHT: 74 IN | DIASTOLIC BLOOD PRESSURE: 80 MMHG | HEART RATE: 83 BPM | BODY MASS INDEX: 39.53 KG/M2 | SYSTOLIC BLOOD PRESSURE: 120 MMHG

## 2024-03-18 DIAGNOSIS — E66.01 MORBID OBESITY (HCC): Primary | ICD-10-CM

## 2024-03-18 PROCEDURE — 99213 OFFICE O/P EST LOW 20 MIN: CPT

## 2024-03-18 PROCEDURE — 3079F DIAST BP 80-89 MM HG: CPT

## 2024-03-18 PROCEDURE — 3008F BODY MASS INDEX DOCD: CPT

## 2024-03-18 PROCEDURE — 3074F SYST BP LT 130 MM HG: CPT

## 2024-03-18 RX ORDER — TIRZEPATIDE 5 MG/.5ML
5 INJECTION, SOLUTION SUBCUTANEOUS WEEKLY
Qty: 3 ML | Refills: 0 | Status: SHIPPED | OUTPATIENT
Start: 2024-03-18

## 2024-03-18 NOTE — PROGRESS NOTES
Luis Alberto Craig is a 53 year old male.  HPI:     Patient in office for weight loss follow up.  He started on zepbound 4 weeks ago.  He reports having abdominal cramps after the first dose but has not had any side effects since.  Appetite is lower and he has experienced some weight loss.      Current Outpatient Medications   Medication Sig Dispense Refill    ALPRAZolam 0.5 MG Oral Tab Take 1 tablet (0.5 mg total) by mouth nightly as needed. 15 tablet 1    metoprolol succinate ER 25 MG Oral Tablet 24 Hr Take 1 tablet (25 mg total) by mouth daily. 90 tablet 1    Tirzepatide-Weight Management (ZEPBOUND) 2.5 MG/0.5ML Subcutaneous Solution Auto-injector Inject 2.5 mg into the skin once a week for 28 days. 3 mL 0    lisinopril 10 MG Oral Tab Take 1 tablet (10 mg total) by mouth daily. 30 tablet 1    atorvastatin (LIPITOR) 10 MG Oral Tab Take 1 tablet (10 mg total) by mouth daily. 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 Leonardo Zhang at [facility] following multiple episodes of urinary retention and UTIs due to persistent urethral strictures    Lumbar discitis 12/2020    Developed osteomyelitis, had to have a abscess drained by Dr. Blanco, treated with IV antibiotics under the direction of Dr. Al      Social History:  Social History     Socioeconomic History    Marital status:    Occupational History     Employer: [employer]   Tobacco Use    Smoking status: Never    Smokeless tobacco: Former     Quit date: 6/30/2014   Vaping Use    Vaping Use: Never used   Substance and Sexual Activity    Alcohol use: Not Currently     Comment: OCCASIONAL-WEEKEND    Drug use: No          REVIEW OF SYSTEMS:   GENERAL HEALTH: feels well otherwise  SKIN: denies any unusual skin lesions  or rashes  RESPIRATORY: denies shortness of breath with exertion  CARDIOVASCULAR: denies chest pain on exertion  GI: denies abdominal pain and denies heartburn  NEURO: denies headaches    EXAM:   Ht 6' 2\" (1.88 m)   Wt (!) 308 lb (139.7 kg)   BMI 39.54 kg/m²   GENERAL: well developed, well nourished,in no apparent distress  HEENT: atraumatic, normocephalic,ears and throat are clear  NECK: supple,no adenopathy,no bruits  LUNGS: clear to auscultation  CARDIO: RRR without murmur  GI: good BS's,no masses, HSM or tenderness  EXTREMITIES: no cyanosis, clubbing or edema    ASSESSMENT AND PLAN:     1. Morbid obesity (HCC)  Titrated from 2.5 to 5mg dose.   - Tirzepatide-Weight Management (ZEPBOUND) 5 MG/0.5ML Subcutaneous Solution Auto-injector; Inject 5 mg into the skin once a week.  Dispense: 3 mL; Refill: 0     The patient indicates understanding of these issues and agrees to the plan.  The patient is asked to return in 1 month.    Halle Bowen, APRN  3/18/2024

## 2024-04-15 ENCOUNTER — OFFICE VISIT (OUTPATIENT)
Dept: FAMILY MEDICINE CLINIC | Facility: CLINIC | Age: 54
End: 2024-04-15
Payer: COMMERCIAL

## 2024-04-15 VITALS
DIASTOLIC BLOOD PRESSURE: 60 MMHG | WEIGHT: 293.38 LBS | TEMPERATURE: 98 F | OXYGEN SATURATION: 98 % | HEART RATE: 72 BPM | SYSTOLIC BLOOD PRESSURE: 100 MMHG | BODY MASS INDEX: 38 KG/M2

## 2024-04-15 DIAGNOSIS — E66.01 MORBID OBESITY (HCC): Primary | ICD-10-CM

## 2024-04-15 PROCEDURE — 99214 OFFICE O/P EST MOD 30 MIN: CPT

## 2024-04-15 PROCEDURE — 3078F DIAST BP <80 MM HG: CPT

## 2024-04-15 PROCEDURE — 3074F SYST BP LT 130 MM HG: CPT

## 2024-04-15 RX ORDER — TIRZEPATIDE 7.5 MG/.5ML
7.5 INJECTION, SOLUTION SUBCUTANEOUS WEEKLY
Qty: 2 ML | Refills: 0 | Status: SHIPPED | OUTPATIENT
Start: 2024-04-15 | End: 2024-05-07

## 2024-04-15 RX ORDER — TIRZEPATIDE 7.5 MG/.5ML
7.5 INJECTION, SOLUTION SUBCUTANEOUS WEEKLY
Qty: 2 ML | Refills: 0 | Status: CANCELLED | OUTPATIENT
Start: 2024-04-15 | End: 2024-05-07

## 2024-04-15 NOTE — PROGRESS NOTES
Luis Alberto Craig is a 53 year old male.  HPI:     Patient in office for zepbound follow up.  He has been on 5 mg weekly for 4 weeks and has gone down for 308 to 293.  He has not been having any side effects except for less appetite.        Current Outpatient Medications   Medication Sig Dispense Refill    Tirzepatide-Weight Management (ZEPBOUND) 5 MG/0.5ML Subcutaneous Solution Auto-injector Inject 5 mg into the skin once a week. 3 mL 0    ALPRAZolam 0.5 MG Oral Tab Take 1 tablet (0.5 mg total) by mouth nightly as needed. 15 tablet 1    metoprolol succinate ER 25 MG Oral Tablet 24 Hr Take 1 tablet (25 mg total) by mouth daily. 90 tablet 1    lisinopril 10 MG Oral Tab Take 1 tablet (10 mg total) by mouth daily. 30 tablet 1    atorvastatin (LIPITOR) 10 MG Oral Tab Take 1 tablet (10 mg total) by mouth daily. 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 Leonardo Zhang at [facility] following multiple episodes of urinary retention and UTIs due to persistent urethral strictures    Lumbar discitis    Developed osteomyelitis, had to have a abscess drained by Dr. Blanco, treated with IV antibiotics under the direction of Dr. Al      Social History:  Social History     Socioeconomic History    Marital status:    Occupational History     Employer: [employer]   Tobacco Use    Smoking status: Never    Smokeless tobacco: Former     Quit date: 6/30/2014   Vaping Use    Vaping status: Never Used   Substance and Sexual Activity    Alcohol use: Not Currently     Comment: OCCASIONAL-WEEKEND    Drug use: No     Social Determinants of Health     Food Insecurity: Low Risk  (12/12/2023)    Received from [facility], [facility]    Food Insecurity     Have there been  times that your food ran out, and you didn't have money to get more?: No     Are there times that you worry that this might happen?: No   Transportation Needs: Low Risk  (12/12/2023)    [facility], [facility]    Transportation Needs     Do you have trouble getting transportation to medical appointments?: No          REVIEW OF SYSTEMS:   GENERAL HEALTH: feels well otherwise  SKIN: denies any unusual skin lesions or rashes  RESPIRATORY: denies shortness of breath with exertion  CARDIOVASCULAR: denies chest pain on exertion  GI: denies abdominal pain and denies heartburn  NEURO: denies headaches    EXAM:   Wt 293 lb 6 oz (133.1 kg)   BMI 37.67 kg/m²   GENERAL: well developed, well nourished,in no apparent distress  SKIN: no rashes,no suspicious lesions  NECK: supple,no adenopathy,no bruits  LUNGS: clear to auscultation  CARDIO: RRR without murmur    ASSESSMENT AND PLAN:     1. Morbid obesity (HCC)  Zepbound changed from 5 mg weekly to 7.5 mg weekly.  - Tirzepatide-Weight Management (ZEPBOUND) 7.5 MG/0.5ML Subcutaneous Solution Auto-injector; Inject 7.5 mg into the skin once a week for 4 doses.  Dispense: 2 mL; Refill: 0    The patient indicates understanding of these issues and agrees to the plan.  The patient is asked to return in June/July for follow up and labs.    NEERU Cooley  4/15/2024

## 2024-04-18 DIAGNOSIS — E78.00 HYPERCHOLESTEROLEMIA: ICD-10-CM

## 2024-04-19 RX ORDER — ATORVASTATIN CALCIUM 10 MG/1
10 TABLET, FILM COATED ORAL DAILY
Qty: 90 TABLET | Refills: 0 | Status: SHIPPED | OUTPATIENT
Start: 2024-04-19

## 2024-04-19 NOTE — TELEPHONE ENCOUNTER
Last office visit: 4/15/24   Future Appointments   Date Time Provider Department Center   7/3/2024  8:20 AM Mel Guillen APRN [facility]   Last filled:1/16/24 #90 with 0 RF   Last labs:12/13/23     Protocol:  Cholesterol Medication Protocol [id number]/18/2024 05:27 PM   Protocol Details ALT < 80    ALT resulted within past year    Lipid panel within past 12 months    In person appointment or virtual visit in the past 12 mos or appointment in next 3 mos

## 2024-05-13 RX ORDER — TIRZEPATIDE 2.5 MG/.5ML
2.5 INJECTION, SOLUTION SUBCUTANEOUS WEEKLY
Qty: 2 ML | Refills: 0 | Status: SHIPPED | OUTPATIENT
Start: 2024-05-13 | End: 2024-06-04

## 2024-05-16 RX ORDER — TIRZEPATIDE 2.5 MG/.5ML
2.5 INJECTION, SOLUTION SUBCUTANEOUS WEEKLY
Qty: 2 ML | Refills: 0 | Status: SHIPPED | OUTPATIENT
Start: 2024-05-16 | End: 2024-06-07

## 2024-05-31 NOTE — TELEPHONE ENCOUNTER
LMTCB. Encouraged pt to call back with questions, but asked pt to try taking medication longer than 4 days prior to dosage change. content/relaxed

## 2024-06-06 ENCOUNTER — PATIENT MESSAGE (OUTPATIENT)
Dept: FAMILY MEDICINE CLINIC | Facility: CLINIC | Age: 54
End: 2024-06-06

## 2024-06-06 DIAGNOSIS — E66.01 MORBID OBESITY (HCC): Primary | ICD-10-CM

## 2024-06-06 NOTE — TELEPHONE ENCOUNTER
From: Luis Alberto Craig  To: Halle Bowen  Sent: 6/6/2024 10:04 AM CDT  Subject: Zepbound    Good morning, I will be taking my 3rd dose of the 2.5 mg zepbound this Saturday. I wanted to check in and see if we should get the next one going. The last one I got from [facility] [place] [place] in [place] so it’s fine if I keep going there if they have. Thanks

## 2024-06-09 RX ORDER — TIRZEPATIDE 2.5 MG/.5ML
2.5 INJECTION, SOLUTION SUBCUTANEOUS WEEKLY
Qty: 2 ML | Refills: 0 | Status: SHIPPED | OUTPATIENT
Start: 2024-06-09 | End: 2024-06-10 | Stop reason: DRUGHIGH

## 2024-06-10 RX ORDER — TIRZEPATIDE 5 MG/.5ML
5 INJECTION, SOLUTION SUBCUTANEOUS WEEKLY
Qty: 2 ML | Refills: 0 | Status: SHIPPED | OUTPATIENT
Start: 2024-06-10 | End: 2024-07-02

## 2024-06-10 NOTE — TELEPHONE ENCOUNTER
Halle Bowen, Boy Jones21 hours ago (1:13 PM)     JN  If he finds the 5 mg in stock we can send it       Please send Zepbound 5mg to [facility] [place]

## 2024-07-03 ENCOUNTER — OFFICE VISIT (OUTPATIENT)
Dept: INTERNAL MEDICINE CLINIC | Facility: CLINIC | Age: 54
End: 2024-07-03
Payer: COMMERCIAL

## 2024-07-03 VITALS
SYSTOLIC BLOOD PRESSURE: 122 MMHG | RESPIRATION RATE: 16 BRPM | BODY MASS INDEX: 35.67 KG/M2 | DIASTOLIC BLOOD PRESSURE: 86 MMHG | HEART RATE: 72 BPM | HEIGHT: 73.5 IN | WEIGHT: 275 LBS

## 2024-07-03 DIAGNOSIS — I10 HYPERTENSION, UNSPECIFIED TYPE: ICD-10-CM

## 2024-07-03 DIAGNOSIS — E78.00 HYPERCHOLESTEREMIA: ICD-10-CM

## 2024-07-03 DIAGNOSIS — R73.03 PREDIABETES: ICD-10-CM

## 2024-07-03 DIAGNOSIS — Z51.81 ENCOUNTER FOR THERAPEUTIC DRUG MONITORING: Primary | ICD-10-CM

## 2024-07-03 DIAGNOSIS — E66.01 CLASS 2 SEVERE OBESITY WITH SERIOUS COMORBIDITY AND BODY MASS INDEX (BMI) OF 35.0 TO 35.9 IN ADULT, UNSPECIFIED OBESITY TYPE (HCC): ICD-10-CM

## 2024-07-03 PROBLEM — E66.812 CLASS 2 SEVERE OBESITY WITH SERIOUS COMORBIDITY AND BODY MASS INDEX (BMI) OF 35.0 TO 35.9 IN ADULT (HCC): Status: ACTIVE | Noted: 2024-07-03

## 2024-07-03 PROCEDURE — 3074F SYST BP LT 130 MM HG: CPT | Performed by: NURSE PRACTITIONER

## 2024-07-03 PROCEDURE — 3079F DIAST BP 80-89 MM HG: CPT | Performed by: NURSE PRACTITIONER

## 2024-07-03 PROCEDURE — 3008F BODY MASS INDEX DOCD: CPT | Performed by: NURSE PRACTITIONER

## 2024-07-03 PROCEDURE — 99214 OFFICE O/P EST MOD 30 MIN: CPT | Performed by: NURSE PRACTITIONER

## 2024-07-03 RX ORDER — TIRZEPATIDE 7.5 MG/.5ML
7.5 INJECTION, SOLUTION SUBCUTANEOUS WEEKLY
Qty: 2 ML | Refills: 0 | Status: SHIPPED | OUTPATIENT
Start: 2024-07-03

## 2024-07-03 RX ORDER — TIRZEPATIDE 10 MG/.5ML
10 INJECTION, SOLUTION SUBCUTANEOUS WEEKLY
Qty: 2 ML | Refills: 2 | Status: SHIPPED | OUTPATIENT
Start: 2024-07-03

## 2024-07-03 NOTE — PROGRESS NOTES
HISTORY OF PRESENT ILLNESS  Chief Complaint   Patient presents with    Weight Problem     Recommendation from Nurse P, she gave Zepbound 2.4 and kirsty 5 and it was backorder and went back to 2.4 mg currently on 5 now, doing great on it, open for meds.        Luis Alberto Craig is a 53 year old male new to our office today for initiation of medical weight loss program, referred by MICHELE.  Patient presents today with c/o excess weight since childhood. Was over 300# as a teenager. Has been on Zepbound since 3/2024 and down 42#. Feeling at a plateau over the past month. Has reduced BP medication and now off off statin therapy, will have repeat labs in the next 1-2 months.    Reason/goal for weight loss: lose 50# and get in shape    Previous weight loss efforts in the past: none reported    Past 6 months lifestyle interventions: yes, regular exercise    Reviewed [facility] patient contract. Readiness for Lifestyle change: 10/10, Interest in Medication: 10/10, Bariatric surgery interest: 5/10.    Barriers to weight loss: snacking, mindless eating    Wt Readings from Last 6 Encounters:   07/03/24 275 lb (124.7 kg)   04/15/24 293 lb 6 oz (133.1 kg)   03/18/24 (!) 308 lb (139.7 kg)   01/15/24 (!) 319 lb (144.7 kg)   12/01/23 (!) 311 lb (141.1 kg)   09/06/23 (!) 317 lb 3.2 oz (143.9 kg)          Social hx and lifestyle reviewed:    How many meals do you eat out per week: not reported  Who is the primary cook in your home: shared with spouse    Please respond to the questions regarding your previous weight loss    How did you hear about the [facility]? [employer]   Previous weight loss efforts in the past/medication(s): Currently started taking zepbound   Eating behaviors/patterns that have been barriers to weight loss success in the past: Snacking to much   Please respond to the questions regarding a 24 hour food journal.  Include the average time you ate and the quantity/food preparation method.    List foods,  qty and prep for breakfast: Usually a banana or piece of toast   List foods, qty and prep for lunch. Lately i have had a salad and yogurt and sometimes chicken   List foods, qty and prep for dinner. Usually some type of meat and vegetables   List foods, qty and prep for snacks. I recently haven’t been snacking much maybe some popcorn   List the types and qty of fluids consumed Water and milk and some beer or bourbon on the weekend   Please respond to the questions regarding lifestyle.    Tobacco use: Yes   Alcohol use: How many servings per week? 4   Supplements taken on a regular basis include: Multi vitamins   Please respond to the questions regarding exercise/activity    How many days per week are you active or exercise 5   What type of activities: Walking   Perceived level of exertion on a scale of 1-5, with 5 being very intense: 2   Average stress level on a scale of 1-10, with 10 being extremely stressed: 7   How do you cope with stress: Relax   Please respond to the questions regarding sleep    How many hours of uninterrupted sleep do you get a night: 5   How many times do you wake up in the night: 2   Do you feel rested in the morning: Yes   Do you snore: Yes   Do you have sleep apnea: No   Do you use: None     Work:  for [employer]  Marital status:   Support: yes    MEDICAL HISTORY  PMH reviewed:   Cardiac disorders: HTN, hyperlipidemia  Depression/anxiety: negative  Glaucoma: negative  Kidney stones: negative  Eating disorder: negative  Migraines: negative  Seizures: negative  Joint-related conditions: negative  Liver disease: negative  Renal disease: negative  Diabetes: prediabetes  Thyroid disease: negative  Constipation: negative  Other pertinent hx: n/a  Sleep Apnea hx: negative  Cancer hx: negative  Cholecystectomy and/or gallstones: negative  Family or personal history of Pancreatic issues / Medullary Thyroid Cancer/MENS 2: negative  History of bariatric surgery: negative    FMH  reviewed: obesity in parent/s or sibling: yes    REVIEW OF SYSTEMS  GENERAL: feels well otherwise  SKIN: denies any rashes to skin folds  HEENT: snoring- yes  LUNGS: denies shortness of breath with exertion, no apnea  CARDIOVASCULAR: denies chest pain on exertion, denies palpitations or pedal edema  GI: denies abdominal pain.  No N/V/D/C  MUSCULOSKELETAL: denies joint pains  NEURO: denies headaches  PSYCH: denies change in behavior or mood, denies feeling sad or depressed.    EXAM  /86   Pulse 72   Resp 16   Ht 6' 1.5\" (1.867 m)   Wt 275 lb (124.7 kg)   BMI 35.79 kg/m² ,   Percent body fat: M >25%: 44.3%. VF: 13. Muscle Mass: 12.9%.  GENERAL: well developed, well nourished, in no apparent distress, obese  SKIN: warm, pink, dry without rashes to exposed area  EYES: conjunctiva pink, sclera non icteric, PERRLA  HEENT: atraumatic, normocephalic, O/p: Mallampati score- 2  NECK: supple, non tender, no adenopathy, no thyromegaly  LUNGS: CTA in all fields, breathing non labored  CARDIO: RRR without murmur, normal S1 and S2 without clicks or gallops, no pedal edema. Reviewed EKG in EMR dated 12/5/23, WNL.  GI: +BS, soft, no masses, HSM or tenderness  MUSCULOSKELETAL: grossly intact  NEURO: Oriented times three  PSYCH: pleasant, cooperative, normal mood and affect    Lab Results   Component Value Date    WBC 5.9 12/01/2023    RBC 5.10 12/01/2023    HGB 15.0 12/01/2023    HCT 47.4 12/01/2023    MCV 92.9 12/01/2023    MCH 29.4 12/01/2023    MCHC 31.6 12/01/2023    RDW 13.7 12/01/2023    .0 12/01/2023     Lab Results   Component Value Date     (H) 12/01/2023    BUN 17 12/01/2023    BUNCREA 20.5 (H) 01/05/2021    CREATSERUM 1.10 12/01/2023    ANIONGAP 4 12/01/2023     07/30/2015    GFRNAA 85 01/07/2021    GFRAA 98 01/07/2021    CA 9.2 12/01/2023    OSMOCALC 293 12/01/2023    ALKPHO 81 12/01/2023    AST 20 12/01/2023    ALT 41 12/01/2023    BILT 0.6 12/01/2023    TP 7.6 12/01/2023    ALB 4.1  12/01/2023    GLOBULIN 3.5 12/01/2023     12/01/2023    K 4.6 12/01/2023     12/01/2023    CO2 27.0 12/01/2023     Lab Results   Component Value Date     01/15/2024    A1C 5.7 (H) 01/15/2024     Lab Results   Component Value Date    CHOLEST 217 (H) 01/15/2024    TRIG 219 (H) 01/15/2024    HDL 37 (L) 01/15/2024     (H) 01/15/2024    VLDL 41 (H) 01/15/2024    NONHDLC 180 (H) 01/15/2024     No results found for: \"T4F\", \"TSH\", \"TSHT4\"  No results found for: \"B12\", \"VITB12\"  No results found for: \"VITD\", \"QVITD\", \"TZQI46NF\"    Current Outpatient Medications on File Prior to Visit   Medication Sig Dispense Refill    metoprolol succinate ER 25 MG Oral Tablet 24 Hr Take 1 tablet (25 mg total) by mouth daily. 90 tablet 1     No current facility-administered medications on file prior to visit.       ASSESSMENT  Initial Weight Data and Goal Weight Loss:  Weight Calculations  Initial Weight: 275.6 lbs  Initial Weight Date: 07/03/24  5% Goal: 13.78  10% Goal: 27.56    Diagnoses and all orders for this visit:    Encounter for therapeutic drug monitoring  -     TSH and Free T4; Future  -     Vitamin B12; Future  -     Vitamin D; Future  -     Hemoglobin A1C; Future  -     Tirzepatide-Weight Management (ZEPBOUND) 7.5 MG/0.5ML Subcutaneous Solution Auto-injector; Inject 7.5 mg into the skin once a week.  -     Tirzepatide-Weight Management (ZEPBOUND) 10 MG/0.5ML Subcutaneous Solution Auto-injector; Inject 10 mg into the skin once a week. Start after completing full 4 weeks on 7.5 mg weekly dose.    Class 2 severe obesity with serious comorbidity and body mass index (BMI) of 35.0 to 35.9 in adult, unspecified obesity type (HCC)  - Increase Zepbound as directed.  - Reviewed balanced plate nutrition with focus on whole food, regular meals daily that include protein and produce and eliminating/reducing late night eating.  - Counseled on the 4 Pillars of health (sleep, stress, nutrition and fitness).  - Reviewed  weight synopsis in EMR with noted about 42# weight loss since 1/2024. Original baseline BMI at 40.94 before GLP-1 therapy.  -     OP REFERRAL TO DIETITIAN [facility] ([facility] USE ONLY)  -     TSH and Free T4; Future  -     Vitamin B12; Future  -     Vitamin D; Future  -     Hemoglobin A1C; Future  -     Tirzepatide-Weight Management (ZEPBOUND) 7.5 MG/0.5ML Subcutaneous Solution Auto-injector; Inject 7.5 mg into the skin once a week.  -     Tirzepatide-Weight Management (ZEPBOUND) 10 MG/0.5ML Subcutaneous Solution Auto-injector; Inject 10 mg into the skin once a week. Start after completing full 4 weeks on 7.5 mg weekly dose.    Hypertension, unspecified type  - stable on reduced BP medication  -     OP REFERRAL TO DIETITIAN [facility] ([facility] USE ONLY)  -     TSH and Free T4; Future  -     Vitamin B12; Future  -     Vitamin D; Future  -     Hemoglobin A1C; Future    Prediabetes  -     OP REFERRAL TO DI[facility]IAN [facility] ([facility] USE ONLY)  -     TSH and Free T4; Future  -     Vitamin B12; Future  -     Vitamin D; Future  -     Hemoglobin A1C; Future  -     Tirzepatide-Weight Management (ZEPBOUND) 7.5 MG/0.5ML Subcutaneous Solution Auto-injector; Inject 7.5 mg into the skin once a week.  -     Tirzepatide-Weight Management (ZEPBOUND) 10 MG/0.5ML Subcutaneous Solution Auto-injector; Inject 10 mg into the skin once a week. Start after completing full 4 weeks on 7.5 mg weekly dose.    Hypercholesteremia  -     OP REFERRAL TO DI[facility] ([facility] USE ONLY)  -     TSH and Free T4; Future  -     Vitamin B12; Future  -     Vitamin D; Future  -     Hemoglobin A1C; Future        PLAN  Medication use and side effects reviewed with patient.  Medication contraindications: none foreseen  Follow up with dietitian and psychologist as recommended.  Discussed the role of sleep and stress in weight management.  Labs orders as above.  Counseled on comprehensive weight loss plan including attention to nutrition, exercise and behavior/stress management  for success. See patient instruction below for more details.  Reviewed previous labs in EMR/Care Everywhere  Weight Loss Contract reviewed and signed.    Patient Instructions   Welcome to the [facility] Weight Management Program...your Lifestyle Renovation begins now!  Thank you for placing your trust in our health care team, I look forward to working with you along this journey to better health!    Next steps:     1.  Call our office at [phone] to schedule a personal nutrition consultation with one of our registered dieticians, Nadine Reed or Radha. Bring along your food journal (3 days minimum). See journal options below.  2.  Complete fasting (10-12 hours, water only) labs at [facility] lab site prior to next office visit. Lab results will be communicated via ArmorText.  3.  Body composition completed today with findings of: Total body fat: 44.3% (goal <25%), Visceral Fat: 13 (goal <10) and Muscle mass: 12.9% (goal >18%).  4.  Fill your prescribed medication and take as discussed and prescribed: Finish the box of Zepbound 5 mg weekly and then increase Zepbound to 7.5 mg weekly x4 weeks and then increase to 10 mg weekly thereafter. Maintain this dose until next visit. Any further dose titrations beyond this dose will be considered at appointments only, unless otherwise discussed. Visit the website www.zepbound.ABSMaterials for coupon and further education on dosing. This medication may require a prior authorization (PA) by your insurance. A PA may take one week plus to complete and our office will be in touch during this process if needed. If cost/supply prohibitive plan: try alternative pharmacy or return to 5 mg dose if available. Option to switch to Wegovy as well.    Tips while taking an injectable weight loss medication:    Be an intuitive eater. Listen to your hunger and fullness signals, stopping when you are full.  Consume protein and produce in your day, striving for a rainbow of color of  produce.  Reduce portions to staring size of 1 cup size and check in with your gut to see if you are full. Use a sand timer to slow down your eating pace to allow for 15-20 minutes to complete a meal.  Reduce refined sugars and high fat foods, as they may contribute to greater side effects of nausea and heartburn.  Stop eating 3 hours before bedtime to allow your food to digest.  Remain hydrated with water or non caloric and non caffeine beverages.  Use over the counter angel lozenge/supplement to help reduce nausea if needed.  If you have been off your medication for more than 2 weeks please notify our office to determine next dosing, as return to previous dose may not be appropriate or tolerated.    Please try to work on the following dietary changes this first month:    1.  Drink water with meals and throughout the day, cut down on soda and/or juice if consumed. Consider flavored water options like Bubbly, Spindrift, Hint and Yecenia.  2.  Have protein with each meal, examples include: greek yogurt, cottage cheese, hard boiled egg, tofu, chicken, fish, or tuna. Recommended daily protein intake is 125 grams/day. Optimal protein intake is 150 grams/day.  3.  Work towards reducing/eliminating refined carbohydrates and sugars which includes items such as sweets, as well as rice, pasta, and bread and make sure to choose whole grain options when having them with just 1 serving per meal about the size of your inner palm.  4.  Consume non starchy veggies daily working towards making them a good 50% of your daily food intake. Add them to lunch and dinner consistently.  5.  Start a daily probiotic: VSL#3 is recommended, (order on line at www.vsl3.com). Take 1 capsule daily with water for 30 days, then reduce to 1 every other day (this will reduce the cost). Capsules can be left out of refrigerator for 2 weeks. I recommend using a pill box weekly and keeping the bottle in the fridge.    Please download kanu My Fitness Pal,  LoseIt! Or Net Diary to monitor daily dietary intake and you will be able to see if you are eating the right amount of calories or too much or too little which would hinder weight loss. Additionally this will help to see your daily carbohydrate and protein intake. When you set the lulu up choose 1-1.5 lbs/week as a goal.  Keeping a paper food journal is an option as well to remain accountable for your choices- this is the start to mindful eating! A low calorie diet has been consistently shown to support weight loss.    Continue or start exercising to help establish a routine. If not already exercising begin with 1 day/week and progress as able with the goal of working towards 30 minutes 5 days a week at a minimum. A variety or cardio, strength and stretching is important. Review resources below to help support you in building this healthy routine.    Meditation daily can help manage and control stress. Chronic stress can make weight loss difficult.  Exercising is one way to help with stress, but meditation using the CALM Lulu or another comparable alternative can be done in your home or place of work with little time commitment. This Lulu can also help work on behavior change and improve sleep. Check out the segment under Calm Masterclass and listen to The 4 Pillars of Health. A great way to begin learning about the foundation of lifestyle with practical tips to use in your every day. In addition, we offer counseling services and support for individual connection and care. A referral is necessary so please let me know if this is a service you are interested.    Check out www.yourweightmatters.org blog for continued support and education along your weight loss journey to optimal health!      Patient Resources:    Personal Training/Fitness Classes/Health Coaching    [facility] in [place]: Full fitness center with group fitness and personal training located in [place].  Health Coaching with Mona  Earl Mcarthur, and Oc Foster at our [facility]- individual coaching to work on your health goals. Call [phone] and/or email @ [email or web address]. Free 60 minute consult when client of [employer] [facility].  MarciFIT [place] @ [email or web address].[email or web address] Ramp. A variety of group fitness options plus various yoga classes [phone] and/or email Lois at [email or web address]  [facility] Fitness Centers with multiple locations: aWhere (www.ZAO Begun), Club Scene Network5 Training (www.[facility]), Laimoon.com Body Bootcamp (www.GoodClic), Qoopl (www.Aujas Networks), The Exercise  (www.exercisecoach.com), Club PilKlip (www.StepOne.[employer])    Online Fitness  Fitness  on Vigilant Technology  Fit in 10 DVD series   www.Direct Media Technologies  Chair exercises via Sit and Be Fit (www.sitMebelrama.Guardity Technologies) and Meniga (www.Styloola.com) or Jossue Will or Rick Terry videos on YouTube.  Hip Hop Fit with Jeff Araiza at www.hiphopfit.Carbolytic Materials    Apps for on the Go Fitness  Popularo 7 Minute Workout (orange box with white 7) - free on the go HIIT training lulu  Peloton Lulu @ www.onepeloton.com    Nutrition Trackers and Programs  LoseIT! And My Fitness Pal apps and on line for tracking nutrition  NOOM - virtual health coaching  FitFoundation (healthy meals on the go) in [place] @ www.aarkqhwezomsg4m.Riogl[employer] Solar Holding  Niles MERINO @ www.bistromd.Rioglass Solar Holding and Wndbxj00 (calorie smart and low carb plans recommended) @ www.vrkiup57.com, Metabolic Meals @ www.MyMetabolicMeals.com - individual prepared meals to go  Gobble, Blue Apron, Home , Every Plate, Sunbasket- on line meal delivery programs for preparation at home  Meal Village in [place] for homemade meals to go @ www.mealvillage.[employer]  Diet Doctor @ www.dietdoctor.com - low carb swaps  Yuhiginio - meal prep and planning lulu (www.yummly.com)    Stress, Anxiety, Depression, Trauma  CALM meditation lulu  (www.calm.com)  Headspace  Don't let anxiety run your life. Using the science of emotion regulation and mindfulness to overcome fear and worry by Augustus Da Silva PsyD and Pool Salamanca MA.  The [employer] Podcast (September 27, 2023): 6 Magic Words That Stop Anxiety  What Happened to You?- a look at the impact trauma has on behavior written by Ramo Reina and Dr. Nicho Chavez  Whole Again by Jesse Hazel - discovering your true self after trauma    Mindful Eating/The Hungry Brain  Am I Hungry? Mindful eating virtual  kanu (www.amihungry.com)  The Hungry Brain by Michaela Ferrari, PhD  Mindless Eating by Sanchez Sullivan  Weight Loss Surgery Will Not Treat Food Addiction by Celia Cruz Ph.D    Metabolic Dysfunction, Hormones and Cravings  Why We Get Sick? By Wilfrid Diaz (insulin resistance)  Your Body in Balance: The New Science of Food, Hormones, and Health by Dr. Cecilio Roger  The Complete Guide to fasting by Dr. Hernandez  Fast Like a Girl by Dr. Terri Herron  The Menopause Reset by Dr. Terri Herron  Sugar, Salt & Fat by Rachell Paulino, Ph.D, R.D.  The Truth About Sugar - documentary on sugar (Free on Loxo [employer]cology, https://youZing Systemsu.be/0K5euxqUJ0r)  Reverse Visceral Fat: #1 Way to Increase Your Lifespan & End Inflammation with Dr. José Miguel Matta on Utube @ https://InHiro.be/nupPRnvUpJY?si=[email or web address]    Nutrition Support  You Are What You Eat - Netfix series on twin study looking at impact of nutrition changes on health  The End of Dieting: How to Live for Life by Dr. Jamil Hayward M.D. or listen to The Express Fit Podcast Episode 63: Understanding \"Nutritarian\" Eating w/Dr. Jamil Hayward  The Game Changers- Netflix Documentary on plant based nutrition  The Dr. Keith  Wellness Plan by Dr. Jose Keith MD  The Complete Guide to fasting by Dr. Hernandez  @[facility] (Instagram Dietician with support surrounding nutrition and meal prep/planning)    Education, Motivation and Support Resources  Live to 100: Secrets  of the Blue Zones - Netflix series on the secrets to communities living over 100 years old  Atomic Habits by Gonzalo Rodriguez (a book about taking small steps to promote greater behavior change)   Motivation kanu (black box with white \")- daily supportive messages sent to your phone  Can't Hurt Me by Augustus Pucektt (a book exploring the power of discipline in achieving your goals)  Fed Up - documentary about obesity (Free on Utube)  Www.yourweightmatters.org - Obesity Action Coalition sponsored Blog posts  Obesity Action Coalition Resources on topics specific to weight management (www.obesityaction.org)  Fitlosophy Fitspiration - journal to better health (journal book found at [employer] in fitness aisle)  Gilda Busby talk titled: The Call to Courage (Netflix)  The Exam Room by the Physician's Committee (Podcast)  Nutrition Facts by Dr. Gallegos (Podcast)    I also recommend modifying nutrition with a focus on Food 4 Fuel and eating clean, lean and green!     Eat whole foods (think farm to table sources) and minimize/eliminate processed and ultra processed foods.  Eat lean sources of protein, recommending incorporating plant based options (no fat/cholesterol in plants) and focus on lean animal protein sources such as eggs, chicken and fish. Minimize beef such as pork and red meat to 1 serving/week.  Increase the consumption of plant based foods with a goal of making 85% of your daily nutrition from plants. Plant based foods are less calorically dense and more nutritionally dense. They do not have fat, which can contribute to insulin resistance and elevated cholesterol. Fruits and vegetables provide an array of vitamins, minerals, phytonutrients (plant protectors) and antiinflammatory properties. All these components help to prevent disease and help your body to work properly!      Re-thinking Nutrition: Learning to See Food as Fuel  October 8, 2019  Posted in Blog, Nutrition  By Your Weight Matters Campaign    “Dieting” can  start to feel challenging when we begin to associate healthy behaviors with “punishment.” Too often, we overlook the real reason we eat food. It's not only meant to be enjoyed, but also to provide basic fuel for our bodies.  Learning to see food as fuel can help you find balance in your journey with weight. It will teach you about the primal purpose of food, the effect certain foods have on health, and how to listen carefully to what your body is trying to tell you.  It Starts with Cells  Did you know your body has more than 35 trillion cells? Each one serves a purpose.  Once a cell has completed its purpose, it dies. Your body replaces dead and worn-out cells with new and energetic ones. It also depends on this ongoing cell cycle to keep you healthy. And guess what? The foods you eat help shape this process.  Seeing Food as Fuel  Eating the right foods helps build and repair cells to be stronger than before. It does this by getting nutrition from whole grains, vitamins, minerals, fats, protein and other nutrients.  These essential nutrients matter greatly to every single cell in your body. They make up your:  Cell membrane  Nucleus  Mitochondria  When cells join together, they make tissue that brings life to your bones, brain, skin, nerves, muscles, etc. The health and lifespan of your cells depend on the nutrients you get from food. That is why healthy food choices are so important.   Once you can start to see food as being fuel for your body, you will get better at making the healthy choice the easy choice. Food will be less about rewards or punishments and more about supporting your overall health and happiness.  Building Blocks of Good Nutrition  A diet without enough fiber, protein and healthy fats can cause your cells to become brittle, leaky and tired. When cells can't do what they are designed to do, problems like inflammation, cancer and other difficult health conditions start to arise.  Foods that Support  Healthy Cells:  Unsaturated Fats - Fish, nuts avocados, olive oil, flax seed, etc.  Protein - Poultry, lean beef, yogurt, eggs, seeds, beans, etc.  Antioxidants - Fruits, dark green veggies, sweet potatoes, tea, etc.  So, the next time you grab something to eat, ask yourself how that food helps or hurts your body. This is a part of living mindful and being knowledgeable about what you consume regularly.  When you start to see food as fuel, not just a tasty treat or the solution to a bad temper, you will start to make healthier choices more often. Making new habits is one of the building blocks to successful long-term weight management!  Clean Eating: What is it Really About?    March 18, 2022  Posted in Blog, Nutrition  By [employer] [facility] [facility]    Many people focus on “clean eating.” In a world filled with potato chips, fast food burgers and cake, switching to clean eating can be a big change. Is it time for you to make it?    What is Clean Eating?  Eating clean has many variations and has gained popularity over the last several years. It focuses on choosing whole foods and minimally-processed foods and is more of a philosophy than a diet. The goal is to choose foods as close to their natural state as possible. Adding fruits, vegetables, meats, and whole grains is a great start. Processed foods such as chips, cookies and fats are eliminated.    Unlike other plans, clean eating isn’t specifically about portion sizes or numbers. Some find this way of eating to be easier. There is limited research on eating clean; however, a clean diet is plant-based and low in saturated fat.    Tips for Eating Clean    Limit packaged processed foods. Some of this is obvious. It’s time to trade in the chips, cookies and snack cakes for other foods such as fruits, vegetables and nuts. Additionally, you might need to change how you prepare food. Swap boxed mashed potatoes for whole baked potatoes. Instead of bagged salad, chop  your own salad from fresh vegetables.    Read the labels. With the focus on minimally-processed foods, the fewer the ingredients the better. Avoid added sugar, sweeteners and preservatives.  Find other ways to spice up your food. Fresh herbs can go further than any added preservative. Basil, cilantro or chives can spice up any meal.  Choose whole grains. White bread and refined grains should be limited. Instead, choose whole-grain bread, quinoa, brown rice and oats.    Hydrate with water. Water is your main source of fluid. For some variety and extra flavor, add a slice of lime or cucumber into your water. Sodas and sugary drinks should be eliminated. Herbal teas are a great option.  Dive into dairy. Milk, unsweetened yogurt and cheese can be great choices. Try to avoid sweetened milks or yogurts.  Pack the protein. Protein from beans, nuts and legumes are great. So are lean meats without fatty flavorings. Some clean eaters avoid meat from certain grocery stores or brands due to growth hormones and antibiotics. For those, choosing organic may be an option.    Take Things Slow  It can be a little overwhelming to begin a clean eating plan. Throwing away the contents of your cabinet may not be an option. Instead of taking away, focus on adding. Add more fruit and more vegetables to your day. You will find that by doing this, there is less room for processed foods. Small changes over time can add up. Get started today!      What are proteins?  Proteins are one of three primary macronutrients that provide energy to the human body, along with fats and carbohydrates. Proteins are also responsible for a large portion of the work that is done in cells; they are necessary for proper structure and function of tissues and organs, and also act to regulate them. They are comprised of a number of amino acids that are essential to proper body function, and serve as the building blocks of body tissue.  There are 20 different amino  acids in total, and the sequence of amino acids determines a protein's structure and function. While some amino acids can be synthesized in the body, there are 9 amino acids that humans can only obtain from dietary sources (insufficient amounts of which may sometimes result in death), termed essential amino acids. Foods that provide all of the essential amino acids are called complete protein sources, and include both animal (meat, dairy, eggs, fish) as well as plant-based sources (soy, quinoa, buckwheat).  Proteins can be categorized based on the function they provide to the body. Below is a list of some types of proteins:  Antibody--proteins that protect the body from foreign particles, such as viruses and bacteria, by binding to them  Enzyme--proteins that help form new molecules as well as perform the many chemical reactions that occur throughout the body  Messenger--proteins that transmit signals throughout the body to maintain body processes  Structural component--proteins that act as building blocks for cells that ultimately allow the body to move  Transport/storage--proteins that move molecules throughout the body  As can be seen, proteins have many important roles throughout the body, and as such, it is important to provide sufficient nutrition to the body to maintain healthy protein levels.    How much protein do I need?  The amount of protein that the human body requires daily is dependent on many conditions, including overall energy intake, growth of the individual, and physical activity level. It is often estimated based on body weight, as a percentage of total caloric intake (10-35%), or based on age alone. 0.8g/kg of body weight is a commonly cited recommended dietary allowance (RDA). This value is the minimum recommended value to maintain basic nutritional requirements, but consuming more protein, up to a certain point, maybe beneficial, depending on the sources of the protein.  The recommended range  of protein intake is between 0.8 g/kg and 1.8 g/kg of body weight, dependent on the many factors listed above. People who are highly active, or who wish to build more muscle should generally consume more protein. Some sources suggest consuming between 1.8 to 2 g/kg for those who are highly active. The amount of protein a person should consume, to date, is not an exact science, and each individual should consult a specialist, be it a dietitian, doctor, or , to help determine their individual needs.    Foods high in protein  There are many different combinations of food that a person can eat to meet their protein intake requirements. For many people, a large portion of protein intake comes from meat and dairy, though it is possible to get enough protein while meeting certain dietary restrictions you might have. Generally, it is easier to meet your RDA of protein by consuming meat and dairy, but an excess of either can have a negative health impact. There are plenty of plant-based protein options, but they generally contain less protein in a given serving. Ideally, a person should consume a mixture of meat, dairy, and plant-based foods in order to meet their RDA and have a balanced diet replete with nutrients.  If possible, consuming a variety of complete proteins is recommended. A complete protein is a protein that contains a good amount of each of the nine essential amino acids required in the human diet. Examples of complete protein foods or meals include:    Meat/Dairy examples  Eggs  Chicken breast  Cottage cheese  Greek yogurt  Milk  Lean beef  Tuna  Turkey breast  Fish  Shrimp    Vegan/plant-based examples  Buckwheat  Hummus and ruthie  Soy products (tofu, tempeh, edamame beans)  Peanut butter on toast or some other bread  Beans and rice  Quinoa  Hemp and yunior seeds  Spirulina  Generally, meat, poultry, fish, eggs, and dairy products are complete protein sources. Nuts and seeds, legumes, grains,  and vegetables, among other things, are usually incomplete proteins. There is nothing wrong with incomplete proteins however, and there are many healthy, high protein foods that are incomplete proteins. As long as you consume a sufficient variety of incomplete proteins to get all the required amino acids, it is not necessary to specifically eat complete protein foods. In fact, certain high fat red meats for example, a common source of complete proteins, can be unhealthy.   Below are some examples of high protein foods that are not complete proteins:  Almonds  Oats  Broccoli  Lentils  Mak bread  Yohannes seeds  Pumpkin seeds  Peanuts  [place] sprouts  Grapefruit  Green peas  Avocados  Mushrooms  As can be seen, there are many different foods a person can consume to meet their RDA of protein. The examples provided above do not constitute an exhaustive list of high protein or complete protein foods. As with everything else, balance is important, and the examples provided above are an attempt at providing a list of healthier protein options (when consumed in moderation).    Amount of protein in common food      Protein Amount  Milk (1 cup/8 oz)  8 g  Egg (1 large/50 g)  6 g  Meat (1 slice / 2 oz)  14 g  Seafood (2 oz)  16 g  Bread (1 slice/64 g)   8 g  Corn (1 cup/166 g)  16 g  Rice (1 cup/195 g)  5 g  Dry Bean (1 cup/92 g)   16 g  Nuts (1 cup/92 g)    20 g  Fruits and Veggie (1 cup)  1 g    Data above taken from www.calculator.net    The Power of Protein:    High Protein Foods:  FISH  (3-6 ounces/meal)  All types of fish  Seafood (shrimp, scallops, clams, mussels, lobster)  EGGS     2-3 eggs/meal  DAIRY (2/3 to 1 ½ cup)  Cottage cheese   Greek yogurt  PLANTS (½-3/4 cup/meal)  Legumes: Dried beans and peas (black beans, spencer beans, garbanzo beans, kidney, cannellini, navy, split peas, black eyed peas)  Lentils  Quinoa  Soy (edamame, tofu)  PORK   (3-6 oz/meal)  Tenderloin  Pork chop  Top loin roast, boneless  Sirloin  roast, boneless  [place] wilkinson (nitrate free)  Boiled deli ham (nitrate free)    Additional Protein Sources:    BEEF    (3-6 oz/meal)  Flank steak       Skirt steak  Bottom round(rump roast), select   Ground beef, 90% lean (ground sirloin)  Pk eye steak, choice  Eye of round roast, choice   POULTRY  (3-6 oz/meal)  Ground chicken or turkey  Chicken, no skin  Turkey, no skin  PROTEIN SHAKES: OWYN and Koia (plant based and dairy free), Corepower by [employer] (plant based option available), Premier Protein, JuicePlus Complete (www.Luxtera.com)  PROTEIN BARS: RXBAR, PowerCrunch, Quest, Barebells, Mosh      Nutrition and MyPlate: Vegetables  Vegetables are a major source of fiber. They’re also packed with vitamins needed for health and growth. At mealtimes, make half your plate fruits and vegetables.  Nutrient-rich choices  Fresh, frozen, or canned--all vegetables are high in nutrients. The color of the skin tells you what’s inside. So if you eat plenty of colors, you get a variety of nutrients. Some good choices include:  Dark green vegetables, such as spinach, candie greens, kale, and broccoli.  Bright red and orange vegetables, such as carrots, sweet potatoes, red bell peppers, and tomatoes.  Starchy vegetables, such as potatoes and squash.  What makes vegetables less healthy?  Boiling vegetables causes some vitamins to escape into the water. To hold on to vitamins, briefly steam, sauté, stir-hagen, or microwave instead. Overcooking destroys vitamins, so try to keep vegetables a little crispy.  Using a lot of margarine, butter, or salad dressing adds fat and calories, but not many nutrients. A small amount of these toppings is OK. But the more you add, the more fat you add, too.  Frozen vegetables that come with cheese sauce or other processed flavoring are high in fat and salt. It's healthier to season plain frozen vegetables yourself. Try fresh herbs, garlic, toasted almonds, or sesame seeds.  Canned  vegetables often have lots of salt. Shop for low-sodium varieties.  One small change  Sneak vegetables into every meal. Shred carrots into hamburger, or add zucchini to spaghetti and meatballs. You won't even notice! Have a better idea? Write it here:  ________________________________________________________  Date Last Reviewed: 10/1/2017  © 8916-2489 [employer]. [address] [place] [address], [place] [address]. All rights reserved. This information is not intended as a substitute for professional medical care. Always follow your healthcare professional's instructions.    Your cholesterol numbers and weight loss     Cholesterol, found in our blood and in the food we eat, is a misunderstood compound that we have been instructed and cautioned for decades to fear.    We have been told that if we eat cholesterol, then our blood cholesterol levels will significantly rise. Science, however, now shows us that for most people, the cholesterol we eat in healthy food does not in actuality increase our blood cholesterol. Research suggests that it’s the excessive sugars in a person’s eating style that negatively impact our cholesterol levels, also causing weight gain and raising the risk of Type 2 diabetes (high blood sugar). This is largely because sugary foods have a damaging impact on our liver, the organ responsible for making cholesterol.    Another common misunderstanding is the notion that cholesterol levels will drop as does our weight. We think that if we lose weight, it will show immediately with an improved blood lipid panel. But this isn’t the case for about 20% of the population; here’s why: When we lose fat-weight, our fat stores shrink. The fat and cholesterol normally stored in the fat tissue and liver cells have nowhere to go when these fat cells shrink but into the bloodstream, causing a temporary rise in cholesterol to be noticeable in a fasting cholesterol blood test in some people. This rise in  cholesterol being processed out of the body is usually not permanent, and these cholesterol levels will likely drop when this process stabilizes.    It’s also important to realize that a reduction in blood test cholesterol levels is a slower metabolic process that takes time. Cholesterol levels do not drop relative to a drop in fat-weight pound by pound.    Dr. Nieca Goldberg, medical director of the [facility] at [facility], says it can take between three to six months (while some doctors say up to a year or more) to see lower LDL numbers after a successful diet and exercise weight loss experience, noting that it takes a longer period of time to see positive changes in a cholesterol blood test in women than in men.    More helpful cholesterol-reducing tips    In general, high cholesterol can affect anyone, however, science shows that obesity is the most common risk factor. In addition, obesity and high cholesterol are both risk factors for cardiovascular health issues. Stress is also known to increase cholesterol levels especially the bad LDL cholesterol. Processed foods, fried foods, excess sugar in all its varied forms and trans fats must be eliminated from our diet if we aim to achieve good heart health. Smoking lowers good cholesterol, so stop smoking! Exercise is important for heart health, therefore moving somehow every day is key. Menopause for some women can raise cholesterol levels. Genetics can be a factor; familial hypercholesterolemia is an inherited form of high cholesterol that puts these people at risk of early heart disease. Science, however, shows that obesity is the most common risk factor. No matter your numbers or ratios, it’s always important to check with your health care provider on the best course of action to support your cardiovascular health. And remember that how you fuel your body - your nutrition - is key to protecting and maintaining a  healthy heart!    By Kristan Landrum   Tu[place], Apr 19, 2022 6:27 PM    [email or web address]?utm_medium=referral&utm_source=shareArticleButton&utm_campaign=shareArticleButton      Return in about 3 months (around 10/3/2024) for weight management via clinic or VV.    Patient verbalizes understanding.    Mel Guillen, APRN  7/2/2024    DOCUMENTATION OF TIME SPENT: Code selection for this visit was based on time spent : 50 minutes on date of service in preparing to see the patient, obtaining and/or reviewing separately obtained history, performing a medically appropriate examination, counseling and educating the patient/family/caregiver, ordering medications or testing, referring and communicating with other healthcare providers, documenting clinical information in the electronic medical record, independently interpreting results and communicating results to the patient/family/caregiver and care coordination with the patient's other providers.

## 2024-07-03 NOTE — PATIENT INSTRUCTIONS
Welcome to the [facility] Management Program...your Lifestyle Renovation begins now!  Thank you for placing your trust in our health care team, I look forward to working with you along this journey to better health!    Next steps:     1.  Call our office at [phone] to schedule a personal nutrition consultation with one of our registered dieticians, Nadine Reed or Radha. Bring along your food journal (3 days minimum). See journal options below.  2.  Complete fasting (10-12 hours, water only) labs at [facility] lab site prior to next office visit. Lab results will be communicated via Leevia.  3.  Body composition completed today with findings of: Total body fat: 44.3% (goal <25%), Visceral Fat: 13 (goal <10) and Muscle mass: 12.9% (goal >18%).  4.  Fill your prescribed medication and take as discussed and prescribed: Finish the box of Zepbound 5 mg weekly and then increase Zepbound to 7.5 mg weekly x4 weeks and then increase to 10 mg weekly thereafter. Maintain this dose until next visit. Any further dose titrations beyond this dose will be considered at appointments only, unless otherwise discussed. Visit the website www.[email or web address].Opower for coupon and further education on dosing. This medication may require a prior authorization (PA) by your insurance. A PA may take one week plus to complete and our office will be in touch during this process if needed. If cost/supply prohibitive plan: try alternative pharmacy or return to 5 mg dose if available. Option to switch to Wegovy as well.    Tips while taking an injectable weight loss medication:    Be an intuitive eater. Listen to your hunger and fullness signals, stopping when you are full.  Consume protein and produce in your day, striving for a rainbow of color of produce.  Reduce portions to staring size of 1 cup size and check in with your gut to see if you are full. Use a sand timer to slow down your eating pace to allow for 15-20 minutes to  complete a meal.  Reduce refined sugars and high fat foods, as they may contribute to greater side effects of nausea and heartburn.  Stop eating 3 hours before bedtime to allow your food to digest.  Remain hydrated with water or non caloric and non caffeine beverages.  Use over the counter angel lozenge/supplement to help reduce nausea if needed.  If you have been off your medication for more than 2 weeks please notify our office to determine next dosing, as return to previous dose may not be appropriate or tolerated.    Please try to work on the following dietary changes this first month:    1.  Drink water with meals and throughout the day, cut down on soda and/or juice if consumed. Consider flavored water options like Bubbly, Spindrift, Hint and Yecenia.  2.  Have protein with each meal, examples include: greek yogurt, cottage cheese, hard boiled egg, tofu, chicken, fish, or tuna. Recommended daily protein intake is 125 grams/day. Optimal protein intake is 150 grams/day.  3.  Work towards reducing/eliminating refined carbohydrates and sugars which includes items such as sweets, as well as rice, pasta, and bread and make sure to choose whole grain options when having them with just 1 serving per meal about the size of your inner palm.  4.  Consume non starchy veggies daily working towards making them a good 50% of your daily food intake. Add them to lunch and dinner consistently.  5.  Start a daily probiotic: VSL#3 is recommended, (order on line at www.vsl3.com). Take 1 capsule daily with water for 30 days, then reduce to 1 every other day (this will reduce the cost). Capsules can be left out of refrigerator for 2 weeks. I recommend using a pill box weekly and keeping the bottle in the fridge.    Please download kanu My Fitness Pal, LoseIt! Or Net Diary to monitor daily dietary intake and you will be able to see if you are eating the right amount of calories or too much or too little which would hinder weight  loss. Additionally this will help to see your daily carbohydrate and protein intake. When you set the lulu up choose 1-1.5 lbs/week as a goal.  Keeping a paper food journal is an option as well to remain accountable for your choices- this is the start to mindful eating! A low calorie diet has been consistently shown to support weight loss.    Continue or start exercising to help establish a routine. If not already exercising begin with 1 day/week and progress as able with the goal of working towards 30 minutes 5 days a week at a minimum. A variety or cardio, strength and stretching is important. Review resources below to help support you in building this healthy routine.    Meditation daily can help manage and control stress. Chronic stress can make weight loss difficult.  Exercising is one way to help with stress, but meditation using the CALM Lulu or another comparable alternative can be done in your home or place of work with little time commitment. This Lulu can also help work on behavior change and improve sleep. Check out the segment under Calm Masterclass and listen to The 4 Pillars of Health. A great way to begin learning about the foundation of lifestyle with practical tips to use in your every day. In addition, we offer counseling services and support for individual connection and care. A referral is necessary so please let me know if this is a service you are interested.    Check out www.yourweightmatters.org blog for continued support and education along your weight loss journey to optimal health!      Patient Resources:    Personal Training/Fitness Classes/Health Coaching    [facility] in [place]: Full fitness center with group fitness and personal training located in [place].  Health Coaching with Mona Mcarthur, Earl Gomez, and Oc Foster at our [facility]- individual coaching to work on your health goals. Call [phone] and/or email @ [email or web address]. Free 60  minute consult when client of [facility].  JOSÉ LUIS Scott @ [email or web address] [place] [address].[email or web address]. A variety of group fitness options plus various yoga classes [phone] and/or email Lois at [email or web address]  [facility] Fitness Centers with multiple locations: Camping and Co (www.Myrl), Instabank5 Training (www.Spectra7 Microsystems), Barnes & Noble Body Bootcamp (www.Massively Parallel Technologies.Ignite100), RehabDev (www.Quantum), The Exercise  (www.exercisecoach.com), Club Pilates (www.clubUni-Power Group.Ignite100)    Online Fitness  Fitness  on Pubster  Fit in 10 DVD series   www.bpczh55XBXYgline.com  Chair exercises via Sit and Be Fit (www.sitandbefit.org) and Salesforce Radian6 (www.Xdynia.com) or Jossue Will or Rick Terry videos on YouTube.  Hip Hop Fit with Jeff Araiza at www.hiphopfit.Presto Services    Apps for on the Go Fitness  fashionandyou.com 7 Minute Workout (orange box with white 7) - free on the go HIIT training lulu  Peloton Lulu @ www.onepeloton.com    Nutrition Trackers and Programs  LoseIT! And My Fitness Pal apps and on line for tracking nutrition  NOOM - virtual health coaching  FitFoundation (healthy meals on the go) in [place] @ www.[email or web address].[email or web address]  [place] MERINO @ www.bistromd.Ignite100 and Twknvj33 (calorie smart and low carb plans recommended) @ www.bqdced57.com, Metabolic Meals @ www.MyMetabolicMeals.com - individual prepared meals to go  Gobble, Blue Apron, Home , Every Plate, Sunbasket- on line meal delivery programs for preparation at home  Meal Village in [place] for homemade meals to go @ www.mealvillage.com  Diet Doctor @ www.dietdoctor.com - low carb swaps  YummSensorTech - meal prep and planning lulu (www.yummly.com)    Stress, Anxiety, Depression, Trauma  CALM meditation lulu (www.calm.com)  Headspace  Don't let anxiety run your life. Using the science of emotion regulation and mindfulness to overcome fear and worry by Augustus Da Silva PsyD and Pool Salamanca MA.  The  Shirin Montague Podcast (September 27, 2023): 6 Magic Words That Stop Anxiety  What Happened to You?- a look at the impact trauma has on behavior written by Ramo Reina and Dr. Nicho Chavez  Whole Again by Jesse Hazel - discovering your true self after trauma    Mindful Eating/The Hungry Brain  Am I Hungry? Mindful eating virtual  kanu (www.amihungry.com)  The Hungry Brain by Michaela Ferrari, PhD  Mindless Eating by Sanchez Sullivan  Weight Loss Surgery Will Not Treat Food Addiction by Celia Cruz Ph.D    Metabolic Dysfunction, Hormones and Cravings  Why We Get Sick? By Wilfrid Diaz (insulin resistance)  Your Body in Balance: The New Science of Food, Hormones, and Health by Dr. Cecilio Roger  The Complete Guide to fasting by Dr. Hernandez  Fast Like a Girl by Dr. Terri Herron  The Menopause Reset by Dr. Terri Herron  Sugar, Salt & Fat by Rachell Paulino, Ph.D, R.D.  The Truth About Sugar - documentary on sugar (Free on Sokikom, https://[email or web address])  Reverse Visceral Fat: #1 Way to Increase Your Lifespan & End Inflammation with Dr. José Miguel Matta on Sokikom @ https://[email or web address].be/[email or web address]?[email or web address]    Nutrition Support  You Are What You Eat - Netfix series on twin study looking at impact of nutrition changes on health  The End of Dieting: How to Live for Life by Dr. Jamil Hayward M.D. or listen to The RES Software Podcast Episode 63: Understanding \"Nutritarian\" Eating w/Dr. Jamil Hayward  The Game Changers- Netflix Documentary on plant based nutrition  The Dr. Keith T5 Wellness Plan by Dr. Jose Keith MD  The Complete Guide to fasting by Dr. Hernandez  @[facility] ([facility] Dietician with support surrounding nutrition and meal prep/planning)    Education, Motivation and Support Resources  Live to 100: Secrets of the Blue Zones - Netflix series on the secrets to communities living over 100 years old  Atomic Habits by Gonzalo Rodriguez (a book about taking small steps to promote greater behavior change)   Motivation  kanu (black box with white \")- daily supportive messages sent to your phone  Can't Hurt Me by Augustus Puckett (a book exploring the power of discipline in achieving your goals)  Fed Up - documentary about obesity (Free on Utube)  Www.yourweightmatters.org - Obesity Action Coalition sponsored Blog posts  Obesity Action Coalition Resources on topics specific to weight management (www.obesityaction.org)  Fitlosophy Fitspiration - journal to better health (journal book found at [employer] in fitness aisle)  Gilda Busby talk titled: The Call to Courage (Netflix)  The Exam Room by the Physician's Committee (Podcast)  Nutrition Facts by Dr. Gallegos (Podcast)    I also recommend modifying nutrition with a focus on Food 4 Fuel and eating clean, lean and green!     Eat whole foods (think farm to table sources) and minimize/eliminate processed and ultra processed foods.  Eat lean sources of protein, recommending incorporating plant based options (no fat/cholesterol in plants) and focus on lean animal protein sources such as eggs, chicken and fish. Minimize beef such as pork and red meat to 1 serving/week.  Increase the consumption of plant based foods with a goal of making 85% of your daily nutrition from plants. Plant based foods are less calorically dense and more nutritionally dense. They do not have fat, which can contribute to insulin resistance and elevated cholesterol. Fruits and vegetables provide an array of vitamins, minerals, phytonutrients (plant protectors) and antiinflammatory properties. All these components help to prevent disease and help your body to work properly!      Re-thinking Nutrition: Learning to See Food as Fuel  October 8, 2019  Posted in Blog, Nutrition  By Your Weight Matters Campaign    “Dieting” can start to feel challenging when we begin to associate healthy behaviors with “punishment.” Too often, we overlook the real reason we eat food. It's not only meant to be enjoyed, but also to provide basic fuel  for our bodies.  Learning to see food as fuel can help you find balance in your journey with weight. It will teach you about the primal purpose of food, the effect certain foods have on health, and how to listen carefully to what your body is trying to tell you.  It Starts with Cells  Did you know your body has more than 35 trillion cells? Each one serves a purpose.  Once a cell has completed its purpose, it dies. Your body replaces dead and worn-out cells with new and energetic ones. It also depends on this ongoing cell cycle to keep you healthy. And guess what? The foods you eat help shape this process.  Seeing Food as Fuel  Eating the right foods helps build and repair cells to be stronger than before. It does this by getting nutrition from whole grains, vitamins, minerals, fats, protein and other nutrients.  These essential nutrients matter greatly to every single cell in your body. They make up your:  Cell membrane  Nucleus  Mitochondria  When cells join together, they make tissue that brings life to your bones, brain, skin, nerves, muscles, etc. The health and lifespan of your cells depend on the nutrients you get from food. That is why healthy food choices are so important.   Once you can start to see food as being fuel for your body, you will get better at making the healthy choice the easy choice. Food will be less about rewards or punishments and more about supporting your overall health and happiness.  Building Blocks of Good Nutrition  A diet without enough fiber, protein and healthy fats can cause your cells to become brittle, leaky and tired. When cells can't do what they are designed to do, problems like inflammation, cancer and other difficult health conditions start to arise.  Foods that Support Healthy Cells:  Unsaturated Fats - Fish, nuts avocados, olive oil, flax seed, etc.  Protein - Poultry, lean beef, yogurt, eggs, seeds, beans, etc.  Antioxidants - Fruits, dark green veggies, sweet potatoes,  tea, etc.  So, the next time you grab something to eat, ask yourself how that food helps or hurts your body. This is a part of living mindful and being knowledgeable about what you consume regularly.  When you start to see food as fuel, not just a tasty treat or the solution to a bad temper, you will start to make healthier choices more often. Making new habits is one of the building blocks to successful long-term weight management!  Clean Eating: What is it Really About?    March 18, 2022  Posted in Blog, Nutrition  By Your Weight Matters Campaign    Many people focus on “clean eating.” In a world filled with potato chips, fast food burgers and cake, switching to clean eating can be a big change. Is it time for you to make it?    What is Clean Eating?  Eating clean has many variations and has gained popularity over the last several years. It focuses on choosing whole foods and minimally-processed foods and is more of a philosophy than a diet. The goal is to choose foods as close to their natural state as possible. Adding fruits, vegetables, meats, and whole grains is a great start. Processed foods such as chips, cookies and fats are eliminated.    Unlike other plans, clean eating isn’t specifically about portion sizes or numbers. Some find this way of eating to be easier. There is limited research on eating clean; however, a clean diet is plant-based and low in saturated fat.    Tips for Eating Clean    Limit packaged processed foods. Some of this is obvious. It’s time to trade in the chips, cookies and snack cakes for other foods such as fruits, vegetables and nuts. Additionally, you might need to change how you prepare food. Swap boxed mashed potatoes for whole baked potatoes. Instead of bagged salad, chop your own salad from fresh vegetables.    Read the labels. With the focus on minimally-processed foods, the fewer the ingredients the better. Avoid added sugar, sweeteners and preservatives.  Find other ways to  spice up your food. Fresh herbs can go further than any added preservative. Basil, cilantro or chives can spice up any meal.  Choose whole grains. White bread and refined grains should be limited. Instead, choose whole-grain bread, quinoa, brown rice and oats.    Hydrate with water. Water is your main source of fluid. For some variety and extra flavor, add a slice of lime or cucumber into your water. Sodas and sugary drinks should be eliminated. Herbal teas are a great option.  Dive into dairy. Milk, unsweetened yogurt and cheese can be great choices. Try to avoid sweetened milks or yogurts.  Pack the protein. Protein from beans, nuts and legumes are great. So are lean meats without fatty flavorings. Some clean eaters avoid meat from certain grocery stores or brands due to growth hormones and antibiotics. For those, choosing organic may be an option.    Take Things Slow  It can be a little overwhelming to begin a clean eating plan. Throwing away the contents of your cabinet may not be an option. Instead of taking away, focus on adding. Add more fruit and more vegetables to your day. You will find that by doing this, there is less room for processed foods. Small changes over time can add up. Get started today!      What are proteins?  Proteins are one of three primary macronutrients that provide energy to the human body, along with fats and carbohydrates. Proteins are also responsible for a large portion of the work that is done in cells; they are necessary for proper structure and function of tissues and organs, and also act to regulate them. They are comprised of a number of amino acids that are essential to proper body function, and serve as the building blocks of body tissue.  There are 20 different amino acids in total, and the sequence of amino acids determines a protein's structure and function. While some amino acids can be synthesized in the body, there are 9 amino acids that humans can only obtain from  dietary sources (insufficient amounts of which may sometimes result in death), termed essential amino acids. Foods that provide all of the essential amino acids are called complete protein sources, and include both animal (meat, dairy, eggs, fish) as well as plant-based sources (soy, quinoa, buckwheat).  Proteins can be categorized based on the function they provide to the body. Below is a list of some types of proteins:  Antibody--proteins that protect the body from foreign particles, such as viruses and bacteria, by binding to them  Enzyme--proteins that help form new molecules as well as perform the many chemical reactions that occur throughout the body  Messenger--proteins that transmit signals throughout the body to maintain body processes  Structural component--proteins that act as building blocks for cells that ultimately allow the body to move  Transport/storage--proteins that move molecules throughout the body  As can be seen, proteins have many important roles throughout the body, and as such, it is important to provide sufficient nutrition to the body to maintain healthy protein levels.    How much protein do I need?  The amount of protein that the human body requires daily is dependent on many conditions, including overall energy intake, growth of the individual, and physical activity level. It is often estimated based on body weight, as a percentage of total caloric intake (10-35%), or based on age alone. 0.8g/kg of body weight is a commonly cited recommended dietary allowance (RDA). This value is the minimum recommended value to maintain basic nutritional requirements, but consuming more protein, up to a certain point, maybe beneficial, depending on the sources of the protein.  The recommended range of protein intake is between 0.8 g/kg and 1.8 g/kg of body weight, dependent on the many factors listed above. People who are highly active, or who wish to build more muscle should generally consume more  protein. Some sources suggest consuming between 1.8 to 2 g/kg for those who are highly active. The amount of protein a person should consume, to date, is not an exact science, and each individual should consult a specialist, be it a dietitian, doctor, or , to help determine their individual needs.    Foods high in protein  There are many different combinations of food that a person can eat to meet their protein intake requirements. For many people, a large portion of protein intake comes from meat and dairy, though it is possible to get enough protein while meeting certain dietary restrictions you might have. Generally, it is easier to meet your RDA of protein by consuming meat and dairy, but an excess of either can have a negative health impact. There are plenty of plant-based protein options, but they generally contain less protein in a given serving. Ideally, a person should consume a mixture of meat, dairy, and plant-based foods in order to meet their RDA and have a balanced diet replete with nutrients.  If possible, consuming a variety of complete proteins is recommended. A complete protein is a protein that contains a good amount of each of the nine essential amino acids required in the human diet. Examples of complete protein foods or meals include:    Meat/Dairy examples  Eggs  Chicken breast  Cottage cheese  Greek yogurt  Milk  Lean beef  Tuna  Turkey breast  Fish  Shrimp    Vegan/plant-based examples  Buckwheat  Hummus and ruthie  Soy products (tofu, tempeh, edamame beans)  Peanut butter on toast or some other bread  Beans and rice  Quinoa  Hemp and yunior seeds  Spirulina  Generally, meat, poultry, fish, eggs, and dairy products are complete protein sources. Nuts and seeds, legumes, grains, and vegetables, among other things, are usually incomplete proteins. There is nothing wrong with incomplete proteins however, and there are many healthy, high protein foods that are incomplete proteins. As  long as you consume a sufficient variety of incomplete proteins to get all the required amino acids, it is not necessary to specifically eat complete protein foods. In fact, certain high fat red meats for example, a common source of complete proteins, can be unhealthy.   Below are some examples of high protein foods that are not complete proteins:  Almonds  Oats  Broccoli  Lentils  Mak bread  Yohannes seeds  Pumpkin seeds  Peanuts  [place] sprouts  Grapefruit  Green peas  Avocados  Mushrooms  As can be seen, there are many different foods a person can consume to meet their RDA of protein. The examples provided above do not constitute an exhaustive list of high protein or complete protein foods. As with everything else, balance is important, and the examples provided above are an attempt at providing a list of healthier protein options (when consumed in moderation).    Amount of protein in common food      Protein Amount  Milk (1 cup/8 oz)  8 g  Egg (1 large/50 g)  6 g  Meat (1 slice / 2 oz)  14 g  Seafood (2 oz)  16 g  Bread (1 slice/64 g)   8 g  Corn (1 cup/166 g)  16 g  Rice (1 cup/195 g)  5 g  Dry Bean (1 cup/92 g)   16 g  Nuts (1 cup/92 g)    20 g  Fruits and Veggie (1 cup)  1 g    Data above taken from www.calculator.net    The Power of Protein:    High Protein Foods:  FISH  (3-6 ounces/meal)  All types of fish  Seafood (shrimp, scallops, clams, mussels, lobster)  EGGS     2-3 eggs/meal  DAIRY (2/3 to 1 ½ cup)  Cottage cheese   Greek yogurt  PLANTS (½-3/4 cup/meal)  Legumes: Dried beans and peas (black beans, spencer beans, garbanzo beans, kidney, cannellini, navy, split peas, black eyed peas)  Lentils  Quinoa  Soy (edamame, tofu)  PORK   (3-6 oz/meal)  Tenderloin  Pork chop  Top loin roast, boneless  Sirloin roast, boneless  Azerbaijani wilkinson (nitrate free)  Boiled deli ham (nitrate free)    Additional Protein Sources:    BEEF    (3-6 oz/meal)  Flank steak       Skirt steak  Bottom round(rump roast), select    Ground beef, 90% lean (ground sirloin)  Pk eye steak, choice  Eye of round roast, choice   POULTRY  (3-6 oz/meal)  Ground chicken or turkey  Chicken, no skin  Turkey, no skin  PROTEIN SHAKES: ANN and Aminta (plant based and dairy free), Corepower by Fairlife, Orgain (plant based option available), Premier Protein, JuicePlus Complete (www.juiceplus.com)  PROTEIN BARS: [employer], [employer], [employer], [employer], [employer]      Nutrition and MyPlate: Vegetables  Vegetables are a major source of fiber. They’re also packed with vitamins needed for health and growth. At mealtimes, make half your plate fruits and vegetables.  Nutrient-rich choices  Fresh, frozen, or canned--all vegetables are high in nutrients. The color of the skin tells you what’s inside. So if you eat plenty of colors, you get a variety of nutrients. Some good choices include:  Dark green vegetables, such as spinach, candie greens, kale, and broccoli.  Bright red and orange vegetables, such as carrots, sweet potatoes, red bell peppers, and tomatoes.  Starchy vegetables, such as potatoes and squash.  What makes vegetables less healthy?  Boiling vegetables causes some vitamins to escape into the water. To hold on to vitamins, briefly steam, sauté, stir-hagen, or microwave instead. Overcooking destroys vitamins, so try to keep vegetables a little crispy.  Using a lot of margarine, butter, or salad dressing adds fat and calories, but not many nutrients. A small amount of these toppings is OK. But the more you add, the more fat you add, too.  Frozen vegetables that come with cheese sauce or other processed flavoring are high in fat and salt. It's healthier to season plain frozen vegetables yourself. Try fresh herbs, garlic, toasted almonds, or sesame seeds.  Canned vegetables often have lots of salt. Shop for low-sodium varieties.  One small change  Sneak vegetables into every meal. Shred carrots into hamburger, or add zucchini to spaghetti and meatballs. You won't  even notice! Have a better idea? Write it here:  ________________________________________________________  Date Last Reviewed: 10/1/2017  © 3993-4872 [employer]. [address] [place] [address], [place] [address]. All rights reserved. This information is not intended as a substitute for professional medical care. Always follow your healthcare professional's instructions.    Your cholesterol numbers and weight loss     Cholesterol, found in our blood and in the food we eat, is a misunderstood compound that we have been instructed and cautioned for decades to fear.    We have been told that if we eat cholesterol, then our blood cholesterol levels will significantly rise. Science, however, now shows us that for most people, the cholesterol we eat in healthy food does not in actuality increase our blood cholesterol. Research suggests that it’s the excessive sugars in a person’s eating style that negatively impact our cholesterol levels, also causing weight gain and raising the risk of Type 2 diabetes (high blood sugar). This is largely because sugary foods have a damaging impact on our liver, the organ responsible for making cholesterol.    Another common misunderstanding is the notion that cholesterol levels will drop as does our weight. We think that if we lose weight, it will show immediately with an improved blood lipid panel. But this isn’t the case for about 20% of the population; here’s why: When we lose fat-weight, our fat stores shrink. The fat and cholesterol normally stored in the fat tissue and liver cells have nowhere to go when these fat cells shrink but into the bloodstream, causing a temporary rise in cholesterol to be noticeable in a fasting cholesterol blood test in some people. This rise in cholesterol being processed out of the body is usually not permanent, and these cholesterol levels will likely drop when this process stabilizes.    It’s also important to realize that a reduction in blood  test cholesterol levels is a slower metabolic process that takes time. Cholesterol levels do not drop relative to a drop in fat-weight pound by pound.    Dr. Nieca Goldberg, medical director of the [facility] at [facility], says it can take between three to six months (while some doctors say up to a year or more) to see lower LDL numbers after a successful diet and exercise weight loss experience, noting that it takes a longer period of time to see positive changes in a cholesterol blood test in women than in men.    More helpful cholesterol-reducing tips    In general, high cholesterol can affect anyone, however, science shows that obesity is the most common risk factor. In addition, obesity and high cholesterol are both risk factors for cardiovascular health issues. Stress is also known to increase cholesterol levels especially the bad LDL cholesterol. Processed foods, fried foods, excess sugar in all its varied forms and trans fats must be eliminated from our diet if we aim to achieve good heart health. Smoking lowers good cholesterol, so stop smoking! Exercise is important for heart health, therefore moving somehow every day is key. Menopause for some women can raise cholesterol levels. Genetics can be a factor; familial hypercholesterolemia is an inherited form of high cholesterol that puts these people at risk of early heart disease. Science, however, shows that obesity is the most common risk factor. No matter your numbers or ratios, it’s always important to check with your health care provider on the best course of action to support your cardiovascular health. And remember that how you fuel your body - your nutrition - is key to protecting and maintaining a healthy heart!    By Kristan Landrum   Tuesday, Apr 19, 2022 6:27  PM    https://www.[employer]are[employer]ucks/articles/your-cholesterol-numbers-and-weight-loss//?utm_medium=referral&utm_source=shareArticleButton&utm_campaign=shareArticleButton

## 2024-07-17 ENCOUNTER — LABORATORY ENCOUNTER (OUTPATIENT)
Dept: LAB | Age: 54
End: 2024-07-17
Attending: FAMILY MEDICINE
Payer: COMMERCIAL

## 2024-07-17 DIAGNOSIS — E78.00 HYPERCHOLESTEREMIA: ICD-10-CM

## 2024-07-17 DIAGNOSIS — I10 HYPERTENSION, UNSPECIFIED TYPE: ICD-10-CM

## 2024-07-17 DIAGNOSIS — E66.01 CLASS 2 SEVERE OBESITY WITH SERIOUS COMORBIDITY AND BODY MASS INDEX (BMI) OF 35.0 TO 35.9 IN ADULT, UNSPECIFIED OBESITY TYPE (HCC): ICD-10-CM

## 2024-07-17 DIAGNOSIS — R73.03 PREDIABETES: ICD-10-CM

## 2024-07-17 DIAGNOSIS — Z51.81 ENCOUNTER FOR THERAPEUTIC DRUG MONITORING: ICD-10-CM

## 2024-07-17 DIAGNOSIS — E78.00 HYPERCHOLESTEROLEMIA: ICD-10-CM

## 2024-07-17 LAB
ALBUMIN SERPL-MCNC: 4.5 G/DL (ref 3.2–4.8)
ALBUMIN/GLOB SERPL: 1.6 {RATIO} (ref 1–2)
ALP LIVER SERPL-CCNC: 77 U/L
ALT SERPL-CCNC: 17 U/L
ANION GAP SERPL CALC-SCNC: 6 MMOL/L (ref 0–18)
AST SERPL-CCNC: 17 U/L (ref ?–34)
BILIRUB SERPL-MCNC: 0.8 MG/DL (ref 0.3–1.2)
BUN BLD-MCNC: 12 MG/DL (ref 9–23)
CALCIUM BLD-MCNC: 9.4 MG/DL (ref 8.7–10.4)
CHLORIDE SERPL-SCNC: 107 MMOL/L (ref 98–112)
CHOLEST SERPL-MCNC: 155 MG/DL (ref ?–200)
CO2 SERPL-SCNC: 27 MMOL/L (ref 21–32)
CREAT BLD-MCNC: 1.07 MG/DL
EGFRCR SERPLBLD CKD-EPI 2021: 84 ML/MIN/1.73M2 (ref 60–?)
EST. AVERAGE GLUCOSE BLD GHB EST-MCNC: 103 MG/DL (ref 68–126)
FASTING PATIENT LIPID ANSWER: YES
FASTING STATUS PATIENT QL REPORTED: YES
GLOBULIN PLAS-MCNC: 2.9 G/DL (ref 2.8–4.4)
GLUCOSE BLD-MCNC: 81 MG/DL (ref 70–99)
HBA1C MFR BLD: 5.2 % (ref ?–5.7)
HDLC SERPL-MCNC: 41 MG/DL (ref 40–59)
LDLC SERPL CALC-MCNC: 96 MG/DL (ref ?–100)
NONHDLC SERPL-MCNC: 114 MG/DL (ref ?–130)
OSMOLALITY SERPL CALC.SUM OF ELEC: 289 MOSM/KG (ref 275–295)
POTASSIUM SERPL-SCNC: 4.6 MMOL/L (ref 3.5–5.1)
PROT SERPL-MCNC: 7.4 G/DL (ref 5.7–8.2)
SODIUM SERPL-SCNC: 140 MMOL/L (ref 136–145)
T4 FREE SERPL-MCNC: 1.2 NG/DL (ref 0.8–1.7)
TRIGL SERPL-MCNC: 95 MG/DL (ref 30–149)
TSI SER-ACNC: 2.86 MIU/ML (ref 0.55–4.78)
VIT B12 SERPL-MCNC: 354 PG/ML (ref 211–911)
VIT D+METAB SERPL-MCNC: 30.2 NG/ML (ref 30–100)
VLDLC SERPL CALC-MCNC: 16 MG/DL (ref 0–30)

## 2024-07-17 PROCEDURE — 83036 HEMOGLOBIN GLYCOSYLATED A1C: CPT | Performed by: NURSE PRACTITIONER

## 2024-07-17 PROCEDURE — 84443 ASSAY THYROID STIM HORMONE: CPT | Performed by: NURSE PRACTITIONER

## 2024-07-17 PROCEDURE — 84439 ASSAY OF FREE THYROXINE: CPT | Performed by: NURSE PRACTITIONER

## 2024-07-17 PROCEDURE — 82306 VITAMIN D 25 HYDROXY: CPT | Performed by: NURSE PRACTITIONER

## 2024-07-17 PROCEDURE — 80061 LIPID PANEL: CPT | Performed by: FAMILY MEDICINE

## 2024-07-17 PROCEDURE — 82607 VITAMIN B-12: CPT | Performed by: NURSE PRACTITIONER

## 2024-07-17 PROCEDURE — 80053 COMPREHEN METABOLIC PANEL: CPT | Performed by: FAMILY MEDICINE

## 2024-07-24 DIAGNOSIS — E78.00 HYPERCHOLESTEROLEMIA: Primary | ICD-10-CM

## 2024-09-26 ENCOUNTER — PATIENT MESSAGE (OUTPATIENT)
Dept: INTERNAL MEDICINE CLINIC | Facility: CLINIC | Age: 54
End: 2024-09-26

## 2024-09-26 DIAGNOSIS — E66.01 CLASS 2 SEVERE OBESITY WITH SERIOUS COMORBIDITY AND BODY MASS INDEX (BMI) OF 35.0 TO 35.9 IN ADULT, UNSPECIFIED OBESITY TYPE (HCC): Primary | ICD-10-CM

## 2024-09-26 NOTE — TELEPHONE ENCOUNTER
Requesting zebpound  LOV: 7/3/24  RTC: 3 months  Last Relevant Labs: na  Filled: 7/3/24 #2 ml with 2 refills    Future Appointments   Date Time Provider Department Center   12/12/2024  3:20 PM Mel Guillen APRN [facility]

## 2024-09-26 NOTE — TELEPHONE ENCOUNTER
From: Luis Alberto Craig  To: Mel Guillen  Sent: 9/26/2024 11:01 AM CDT  Subject: Zepbound prescription     Hello I have received my last refill of zepbound. and my appointment is not until December so I wanted to see if I am staying on the 10mg or changing. I guess either way I will need another prescription. I also received a letter from my insurance about coverage requirements after 10-1-24 I have the letter if you need it please let me know. Thank you

## 2024-09-27 RX ORDER — TIRZEPATIDE 12.5 MG/.5ML
12.5 INJECTION, SOLUTION SUBCUTANEOUS WEEKLY
Qty: 2 ML | Refills: 1 | Status: SHIPPED | OUTPATIENT
Start: 2024-09-27

## 2024-10-28 ENCOUNTER — TELEPHONE (OUTPATIENT)
Dept: INTERNAL MEDICINE CLINIC | Facility: CLINIC | Age: 54
End: 2024-10-28

## 2024-10-28 DIAGNOSIS — R73.03 PREDIABETES: ICD-10-CM

## 2024-10-28 DIAGNOSIS — E78.00 HYPERCHOLESTEREMIA: ICD-10-CM

## 2024-10-28 DIAGNOSIS — E66.812 CLASS 2 SEVERE OBESITY WITH SERIOUS COMORBIDITY AND BODY MASS INDEX (BMI) OF 35.0 TO 35.9 IN ADULT, UNSPECIFIED OBESITY TYPE (HCC): Primary | ICD-10-CM

## 2024-10-28 DIAGNOSIS — E66.01 CLASS 2 SEVERE OBESITY WITH SERIOUS COMORBIDITY AND BODY MASS INDEX (BMI) OF 35.0 TO 35.9 IN ADULT, UNSPECIFIED OBESITY TYPE (HCC): Primary | ICD-10-CM

## 2024-10-28 DIAGNOSIS — Z51.81 ENCOUNTER FOR THERAPEUTIC DRUG MONITORING: ICD-10-CM

## 2024-10-28 DIAGNOSIS — I10 HYPERTENSION, UNSPECIFIED TYPE: ICD-10-CM

## 2024-10-28 NOTE — TELEPHONE ENCOUNTER
Fax received from [facility] ([place]) informing PA needed for Zepbound 12.5mg dose.  Unable to locate expiration date for past PA for Zebpound initiated by patient's PCP.      Call placed to patient's insurance to confirm initial expiration date for patient's PA for Zepbound and to see if AMARIS needed.  Spoke with Ann at [employer].  Was informed a new PA was needed since the plan instituted the [place] requirement on 10/1/24.  Was also informed a AMARIS would be needed to stay at 12.5mg dose.  AMARIS done over the phone and was denied (case # [id number]).  Informed patient would need to move up dose and new PA would be needed for that dose coverage.  MCM sent to patient to obtain additional info before forwarding to JOSEPH for review.  Awaiting patient's response.

## 2024-10-29 NOTE — TELEPHONE ENCOUNTER
KW- please advise    -insurance denied AMARIS to continue Zepbound 12.5mg beyond 1 month.  Patient currently completed 2 doses and is down to 250.4 lbs ( lbs-7/3/24).     -per insurance patient will need to move up to Zepbound 15mg and new PA will be needed.  Patient is ok with increasing to Zepbound 15mg if you advise.      Requesting Zepbound 15mg  LOV: 7/3/24  RTC: 3 months (around 10/3/24)  Last Relevant Labs: n/a  Filled: 9/27/24 Zepbound 15mg #2ml with 1 refills  (Insurance will not cover refill)    Future Appointments   Date Time Provider Department Center   12/12/2024  3:20 PM Mel Guillen APRN [facility]

## 2024-10-31 RX ORDER — TIRZEPATIDE 15 MG/.5ML
15 INJECTION, SOLUTION SUBCUTANEOUS WEEKLY
Qty: 2 ML | Refills: 1 | Status: SHIPPED | OUTPATIENT
Start: 2024-10-31

## 2024-11-04 NOTE — TELEPHONE ENCOUNTER
Unable to do PA in Epic- call placed to [facility]- spoke with Dawn MARSHALL for Zepbound 15mg initiated.  Case ID: [id number].  Advised to fax clinical notes to fax# [phone].  OV note sharing baseline BMI 3/28/24 (39.54- weight 308) along with current OV from 7/3/24 showing BMI of 35.79- weight 275) faxed.  Fax confirmation received.  Awaiting insurance response.

## 2024-12-03 ENCOUNTER — OFFICE VISIT (OUTPATIENT)
Dept: FAMILY MEDICINE CLINIC | Facility: CLINIC | Age: 54
End: 2024-12-03
Payer: COMMERCIAL

## 2024-12-03 VITALS
HEART RATE: 83 BPM | BODY MASS INDEX: 32.85 KG/M2 | WEIGHT: 256 LBS | SYSTOLIC BLOOD PRESSURE: 116 MMHG | OXYGEN SATURATION: 99 % | TEMPERATURE: 98 F | DIASTOLIC BLOOD PRESSURE: 74 MMHG | RESPIRATION RATE: 16 BRPM | HEIGHT: 74 IN

## 2024-12-03 DIAGNOSIS — J01.90 ACUTE NON-RECURRENT SINUSITIS, UNSPECIFIED LOCATION: Primary | ICD-10-CM

## 2024-12-03 DIAGNOSIS — R05.1 ACUTE COUGH: ICD-10-CM

## 2024-12-03 PROCEDURE — 3008F BODY MASS INDEX DOCD: CPT | Performed by: PHYSICIAN ASSISTANT

## 2024-12-03 PROCEDURE — 99213 OFFICE O/P EST LOW 20 MIN: CPT | Performed by: PHYSICIAN ASSISTANT

## 2024-12-03 PROCEDURE — 3078F DIAST BP <80 MM HG: CPT | Performed by: PHYSICIAN ASSISTANT

## 2024-12-03 PROCEDURE — 3074F SYST BP LT 130 MM HG: CPT | Performed by: PHYSICIAN ASSISTANT

## 2024-12-03 RX ORDER — DOXYCYCLINE HYCLATE 100 MG
100 TABLET ORAL 2 TIMES DAILY
Qty: 20 TABLET | Refills: 0 | Status: SHIPPED | OUTPATIENT
Start: 2024-12-03

## 2024-12-03 RX ORDER — BENZONATATE 200 MG/1
200 CAPSULE ORAL 3 TIMES DAILY PRN
Qty: 30 CAPSULE | Refills: 0 | Status: SHIPPED | OUTPATIENT
Start: 2024-12-03

## 2024-12-03 NOTE — PROGRESS NOTES
CHIEF COMPLAINT:     Chief Complaint   Patient presents with    Cough     2 weeks, cough, congestin, headache felt like it was going away but then got worse again  OTC nyquil, sudafed       HPI:   Luis Alberto Craig is a 54 year old male who presents with cough, congestion, headache, sinus pressure for 2 weeks.        Associated symptoms:    Fever/Chills  No  Sore throat  Yes scratchy and sore from drainage  Cough  Yes   Congestion Yes  Bodyache  No  Headache  Yes  Chest pain No  SOB/Dyspnea No  Loss of taste No  Loss of smell No  Diarrhea No  Vomiting No    Covid vaccinations:  x 4    No flu shot this season.    No history of pneumonia or chronic respiratory disease.       Current Outpatient Medications   Medication Sig Dispense Refill    Doxycycline Hyclate 100 MG Oral Tab Take 1 tablet (100 mg total) by mouth 2 (two) times daily. 20 tablet 0    benzonatate 200 MG Oral Cap Take 1 capsule (200 mg total) by mouth 3 (three) times daily as needed for cough. 30 capsule 0    Tirzepatide-Weight Management (ZEPBOUND) 15 MG/0.5ML Subcutaneous Solution Auto-injector Inject 15 mg into the skin once a week. 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 Leonardo Zhang at [facility] following multiple episodes of urinary retention and UTIs due to persistent urethral strictures    Hypercholesteremia    Hypertension    Lumbar discitis    Developed osteomyelitis, had to have a abscess drained by Dr. Blanco, treated with IV antibiotics under the direction of Dr. Al      Social History:  Social History     Socioeconomic History    Marital status:    Occupational History     Employer: [employer]   Tobacco Use    Smoking status: Never    Smokeless tobacco: Former     Quit date: 6/30/2014   Vaping Use    Vaping  status: Never Used   Substance and Sexual Activity    Alcohol use: Yes     Comment: OCCASIONAL-WEEKEND    Drug use: No     Social Drivers of Health     Food Insecurity: Low Risk  (12/12/2023)    Received from [facility]    Food Insecurity     Have there been times that your food ran out, and you didn't have money to get more?: No     Are there times that you worry that this might happen?: No   Transportation Needs: Low Risk  (12/12/2023)    Received from [facility]    Transportation Needs     Do you have trouble getting transportation to medical appointments?: No        Review of Systems:    Positive for stated complaint: cough, sinus.   Pertinent positives and negatives noted in the the HPI.    EXAM:   /74   Pulse 83   Temp 97.9 °F (36.6 °C)   Resp 16   Ht 6' 2\" (1.88 m)   Wt 256 lb (116.1 kg)   SpO2 99%   BMI 32.87 kg/m²   GENERAL: well developed, well nourished,in no apparent distress  SKIN: no rashes,no suspicious lesions  HEAD: atraumatic, normocephalic  EYES: conjunctiva clear, sclera white,  PERRLA  EARS: TM's non erythematous  NOSE: nares patent, mucosa mild congestion  THROAT: Posterior pharynx is non erythematous, no exudates, moderate cobblestoning.  NECK: supple, non-tender  LUNGS: clear to auscultation bilaterally without rale, ronchi, wheeze.  CARDIO: S1/S2 without murmur  EXTREMITIES: no cyanosis, clubbing or edema  LYMPH:  no gross lymphadenopathy.      No results found for this or any previous visit (from the past 24 hours).      ASSESSMENT AND PLAN:   Luis Alberto Craig is a 54 year old male who presents with Cough (2 weeks, cough, congestin, headache felt like it was going away but then got worse again/OTC nyquil, sudafed). Symptoms are consistent with:      ASSESSMENT:  Encounter Diagnoses   Name Primary?    Acute non-recurrent sinusitis, unspecified location Yes    Acute cough         PLAN:       Symptomatic care:   1. Rest. Drink plenty of fluids.  2. Tylenol or ibuprofen for discomfort or fever.   3. OTC decongestant (phenylephrine) expectorants (guaifenesin), nasal steroid sprays  (fluticasone) may be helpful        for congestion.  4. OTC cough suppressant for cough  (dextromethorphan)  5. Chloraseptic spray/throat lozenges for sore throat   6 doxycycline as written  7 tessalon as written     Go to the ED for evaluation with progressive symptoms of difficulty swallowing, breathing, shortness of breath, chest pain, extreme weakness, or confusion.         Meds & Refills for this Visit:  Requested Prescriptions     Signed Prescriptions Disp Refills    Doxycycline Hyclate 100 MG Oral Tab 20 tablet 0     Sig: Take 1 tablet (100 mg total) by mouth 2 (two) times daily.    benzonatate 200 MG Oral Cap 30 capsule 0     Sig: Take 1 capsule (200 mg total) by mouth 3 (three) times daily as needed for cough.       Risks, benefits, side effects of medication addressed and explained.    There are no Patient Instructions on file for this visit.    The patient indicates understanding of these issues and agrees to the plan.  The patient is asked to follow up PCP

## 2024-12-12 ENCOUNTER — OFFICE VISIT (OUTPATIENT)
Dept: INTERNAL MEDICINE CLINIC | Facility: CLINIC | Age: 54
End: 2024-12-12
Payer: COMMERCIAL

## 2024-12-12 VITALS
WEIGHT: 250 LBS | HEART RATE: 76 BPM | DIASTOLIC BLOOD PRESSURE: 78 MMHG | RESPIRATION RATE: 16 BRPM | BODY MASS INDEX: 32.43 KG/M2 | HEIGHT: 73.5 IN | SYSTOLIC BLOOD PRESSURE: 122 MMHG

## 2024-12-12 DIAGNOSIS — E66.01 CLASS 2 SEVERE OBESITY WITH SERIOUS COMORBIDITY AND BODY MASS INDEX (BMI) OF 35.0 TO 35.9 IN ADULT, UNSPECIFIED OBESITY TYPE (HCC): ICD-10-CM

## 2024-12-12 DIAGNOSIS — Z51.81 ENCOUNTER FOR THERAPEUTIC DRUG MONITORING: Primary | ICD-10-CM

## 2024-12-12 DIAGNOSIS — E78.00 HYPERCHOLESTEREMIA: ICD-10-CM

## 2024-12-12 DIAGNOSIS — I10 HYPERTENSION, UNSPECIFIED TYPE: ICD-10-CM

## 2024-12-12 DIAGNOSIS — R73.03 PREDIABETES: ICD-10-CM

## 2024-12-12 DIAGNOSIS — E66.812 CLASS 2 SEVERE OBESITY WITH SERIOUS COMORBIDITY AND BODY MASS INDEX (BMI) OF 35.0 TO 35.9 IN ADULT, UNSPECIFIED OBESITY TYPE (HCC): ICD-10-CM

## 2024-12-12 PROCEDURE — 3078F DIAST BP <80 MM HG: CPT | Performed by: NURSE PRACTITIONER

## 2024-12-12 PROCEDURE — 3008F BODY MASS INDEX DOCD: CPT | Performed by: NURSE PRACTITIONER

## 2024-12-12 PROCEDURE — 99213 OFFICE O/P EST LOW 20 MIN: CPT | Performed by: NURSE PRACTITIONER

## 2024-12-12 PROCEDURE — 3074F SYST BP LT 130 MM HG: CPT | Performed by: NURSE PRACTITIONER

## 2024-12-12 RX ORDER — TIRZEPATIDE 15 MG/.5ML
15 INJECTION, SOLUTION SUBCUTANEOUS WEEKLY
Qty: 6 ML | Refills: 1 | Status: SHIPPED | OUTPATIENT
Start: 2024-12-12

## 2024-12-12 NOTE — PATIENT INSTRUCTIONS
Continue making lifestyle changes that focus on good nutrition, regular exercise and stress management.    Medication Plan: Continue current medication regimen.    Tips while taking an injectable weight loss medication:    Be an intuitive eater. Listen to your hunger and fullness signals, stopping when you are full.  Consume protein and produce in your day, striving for a rainbow of color of produce.  Reduce portions to staring size of 1 cup size and check in with your gut to see if you are full. Set the timer to slow down your eating pace to allow for 15-20 minutes to complete a meal. Recommend following the \"2 bite rule\".  Reduce refined sugars and high fat foods, as they may contribute to greater side effects of nausea and heartburn.  Stop eating 3 hours before bedtime to allow your food to digest.  Remain hydrated with water or non caloric and non caffeine beverages.  Use over the counter angel lozenge/supplement to help reduce nausea if needed.  If you have been off your medication for more than 2 weeks please notify our office to determine next dosing, as return to previous dose may not be appropriate or tolerated.      Next steps to work on before next office visit include: Keep up the great work! Continue to be mindful of pace of eating. Some holiday tips listed below.    Holiday Weight and How to Avoid It    [employer] by Kailyn Bianchi, PhD  https://www.obesityaction.org/resources/holiday-weight-and-ymy-uu-phuih-it/      When we think about the holidays many things come to mind - gifts, shopping, parties, family, decorating, long to-do lists --and delicious holiday treats.    Strategies for Avoiding Holiday Weight Gain  The holiday season is a busy time, and there are eating opportunities everywhere we go, such as family gatherings, office parties with trays of home-baked treats in the lunchroom, holiday and csw-zj-knirqbae programs at our kids’ schools, treat samples being given away as we  make our way through the stores to do our holiday shopping and catalogs in our mailboxes with mouth-watering photo spreads on every page. We’re really busy, perhaps too busy to prepare the healthy meals we might otherwise prepare.    Here are a few tips that will help you negotiate this joyful time with minimum risk to your weight management goals:    Focus on maintaining your current weight. Challenging yourself to lose weight over the holidays is setting yourself up for failure.  Don’t gorge on any special holiday food because you only get to eat it once a year. With luck, you’ll still be around to enjoy it next year. On the other hand, don’t deprive yourself of anything you want to taste. Instead, take a mindful bite, savoring the sight, taste, aroma, mouth feel and sound of each special holiday treat. Eating like this leads to increased pleasure, quicker satisfaction and decreased risk for weight gain.  Avoid the trap of thinking you can eat what you want because you can just start over in the New Year. It doesn’t get any easier just because it’s January - there are always other reasons to indulge and to celebrate.  Keep up your exercise routine. This will also help reduce holiday stress.  Keep tabs on yourself. Write down what you eat, weigh yourself if you want to or try on your favorite clothes to make sure they still fit.  Create meaning beyond the food by creating new traditions that have nothing to do with food. For example, change “in our family we always have chocolate cinnamon bread with whipped cream on Deepika morning” into “in our family we always play in the snow (or on the beach), or go for a long walk/take food and gifts to the homeless shelter on [place] morning.”  Sometimes, eating a particular food is our way of remembering a lost loved one. If that applies to you, find another way to remember them, like sharing memories with family members.  Remember all the reasons why reaching and  maintaining a healthy weight is important to you.  Remember, unless you’re an elite athlete, you’re unlikely to be able to “exercise off” weeks of overindulgence.  Strategies for Holiday Parties  Most of us love holiday parties and look forward to them all year. We get to dress up and go to nice places, spend time with our nearest and dearest, enjoy our favorite holiday music and engage in the traditions that are meaningful to us. Despite all of the excitement, parties can also be minefields when it comes to honoring our healthy lifestyle goals. When we love parties, we may over-indulge as a way of intensifying the positive emotions we’re already feeling, and when we dislike parties, we may over-indulge in an effort to distract ourselves from the emotional discomfort that we’re feeling.    Here are a few tips that will help you get through holiday parties without sabotaging your goals:    Avoid wearing baggy clothing that allows you to expand as you eat.  After you’ve eaten, stay away from the food tables at the party.  Keep your hands busy by finding a way to help out. It’s the best way to distract yourself from the food.  Avoid alcohol. When we drink, we’re more likely to abandon healthy eating.  Fill up with water and other low-calorie drinks.  Take a healthy dish for the pot luck - something you can eat: consider salad, fruit, raw vegetables and a healthy dip.  Focus on your relationships, not on the food - learn to focus on enjoying the people and the special holiday experiences, on building special memories for yourself and your family.  Meeting new people is another good way of distracting yourself from the food. If you’re shy, simply be a good listener.  Plan ahead. The best kind of plan, when it comes to food, is about what you are going to eat - not about what you’re not going to eat. If we focus on what we can’t eat (or what we think we shouldn’t eat), this kind of thinking can set us up for failure  because it simply leaves us feeling deprived.  Don’t arrive completely famished - you’ll be more likely to eat in a way you’ll later regret. Plan to eat on the light side both before and after the event. Think about your meal plan for the day, and leave yourself some room to eat at the party.  Coping with Holiday Stress  As you know, the holiday season can be joyful and stressful at the same time. There’s so much to do, being around family can sometimes be difficult and often, we set ourselves the goal of creating the “perfect” holiday. Being stressed puts us at risk for stress-based eating in an effort to cope.    Here are some strategies you can use to reduce your stress levels:    Focus on what you’re grateful for.  Practice deep breathing whenever you feel overwhelmed.  Keep up your exercise routine.  Remind yourself to do just one thing at a time.  Remember -- you cannot do more than your best.  Be willing to say “no” to some events, tasks or requests. Sometimes this is the best way we can take care of ourselves.  Create a holiday season schedule for yourself. Schedule and prioritize everything you need to get done.  Reduce your expectations - aim for “good enough,” not “perfect.”  If you’re alone during the holidays, pamper yourself and find a way to help others who are less fortunate. This will help reduce your loneliness.  If your relationships with family members are strained, remember that over-indulging in your favorite holiday comfort foods is not going to change how they behave towards you!  Create fun times for yourself. Having fun is a great way of reducing stress!  I hope these tips will help you not just get through the holidays, but that they’ll allow you to feel reassured that you can still have a fun and meaningful time without having to sacrifice your weight management goals. Wishing you a happy, healthy and meaningful holiday season!

## 2024-12-12 NOTE — PROGRESS NOTES
Luis Alberto Craig is a 54 year old male presents today for follow-up on medical weight loss program for the treatment of overweight, obesity, or morbid obesity with associated prediabetes, HTN, Hypercholesterolemia.    S:  Current weight   Wt Readings from Last 6 Encounters:   12/12/24 250 lb (113.4 kg)   12/03/24 256 lb (116.1 kg)   07/03/24 275 lb (124.7 kg)   04/15/24 293 lb 6 oz (133.1 kg)   03/18/24 (!) 308 lb (139.7 kg)   01/15/24 (!) 319 lb (144.7 kg)    AND BMI Body mass index is 32.54 kg/m²..    Patient has lost 25# since LOV 5 month ago as NP consult.  He has been compliant with therapy. He has been tolerating Zepbound well without reported SEs. Feeling good about success. Working on slowing down pace of eating to reduce feeling too full.    Testing/consult completed since LOV: Dietician: no.  Labs: yes, reviewed in EMR.     Labs: sub optimal Vitamin D and B12    [facility] Medical Weight Loss Follow Up    Question 12/5/2024  7:42 PM CST - Filed by Patient   Please describe a success moment: I feel like everything is going well   Please describe a challenging moment/needs for improvement:    Please complete this 24 hour food journal, listing everything you had to eat in the past day. Include the average time of day you ate these meals at    List foods, qty and prep for breakfast: Yogurt and protein shakes   List foods, qty and prep for lunch. Usually a chicken sandwich or wrap and sometimes a salad   List foods, qty and prep for dinner. Usually fish or chicken sometimes bratwurst   List foods, qty and prep for snacks. Fruit   List the types and qty of fluids consumed Water and tea and sometimes a cocktail after dinner   On average, how many meals did you eat out per week? 1   Exercise    How many days per week are you active or exercise 2   On average, how many days were anaerobic (strength/resistance) exercises performed? 2   On average, how many days were aerobic (cardio) exercises performed? 2   Perceived level  of exertion on a scale of 1-5, with 5 being very intense: 2   Stress    Average stress level on a scale of 1-10, with 10 being extremely stressed: 5   If greater than 5/1O how would you grade your coping mechanisms? moderate   Sleep hours and integrity    How many hours of uninterrupted sleep do you get a night: 5   Do you feel rested in the morning: No   If no, what may have been disrupting your sleep? I have never been a great sleeper   Please list any goal(s) for your next visit Continuing weight loss and staying on track to get to 220 pounds     Social hx and PMH reviewed. Employed as  for [employer].     REVIEW OF SYSTEMS:  GENERAL: feels well otherwise  EYES: denies vision changes or high pain/pressure.  LUNGS: denies shortness of breath with exertion  CARDIOVASCULAR: denies chest pain on exertion, denies palpitations or pedal edema  GI: denies abdominal pain.  No N/V/D/C  MUSCULOSKELETAL: no acute joint or muscle pain  NEURO: denies headaches  PSYCH: denies change in behavior or mood, denies feeling sad or depressed    EXAM:  /78   Pulse 76   Resp 16   Ht 6' 1.5\" (1.867 m)   Wt 250 lb (113.4 kg)   BMI 32.54 kg/m² \  GENERAL: well developed, well nourished, in no apparent distress, obese  EYES: conjunctiva pink, sclera non icteric, PERRLA  LUNGS: CTA in all fields, breathing non labored  CARDIO: RRR without murmur, normal S1 and S2 without clicks or gallops, no pedal edema.  GI: +BS  NEURO/MS: motor and sensory grossly intact  PSYCH: pleasant, cooperative, normal mood and affect    ASSESSMENT AND PLAN:  Reviewed Initial Weight Data and Goal Weight Loss:       Encounter Diagnoses   Name Primary?    Encounter for therapeutic drug monitoring Yes    Class 2 severe obesity with serious comorbidity and body mass index (BMI) of 35.0 to 35.9 in adult, unspecified obesity type (HCC)     Prediabetes     Hypertension, unspecified type     Hypercholesteremia        No orders of the defined types  were placed in this encounter.      Meds & Refills for this Visit:  Requested Prescriptions     Signed Prescriptions Disp Refills    Tirzepatide-Weight Management (ZEPBOUND) 15 MG/0.5ML Subcutaneous Solution Auto-injector 6 mL 1     Sig: Inject 15 mg into the skin once a week.       Imaging & Consults:  None      Plan:  Patient has lost 25# since LOV 5 month ago on Zepbound 15 mg weekly with a total weight loss of 25# since initial consult on 7/3/2024 with initial weight of 275# and BMI 35.79.  Labs reviewed. Weight loss goal: lose 50# and get in shape.  CPM.  on holiday tips. See patient instructions below for additional plans and patient counseling.      Patient Instructions   Continue making lifestyle changes that focus on good nutrition, regular exercise and stress management.    Medication Plan: Continue current medication regimen.    Tips while taking an injectable weight loss medication:    Be an intuitive eater. Listen to your hunger and fullness signals, stopping when you are full.  Consume protein and produce in your day, striving for a rainbow of color of produce.  Reduce portions to staring size of 1 cup size and check in with your gut to see if you are full. Set the timer to slow down your eating pace to allow for 15-20 minutes to complete a meal. Recommend following the \"2 bite rule\".  Reduce refined sugars and high fat foods, as they may contribute to greater side effects of nausea and heartburn.  Stop eating 3 hours before bedtime to allow your food to digest.  Remain hydrated with water or non caloric and non caffeine beverages.  Use over the counter angel lozenge/supplement to help reduce nausea if needed.  If you have been off your medication for more than 2 weeks please notify our office to determine next dosing, as return to previous dose may not be appropriate or tolerated.      Next steps to work on before next office visit include: Keep up the great work! Continue to be mindful of  pace of eating. Some holiday tips listed below.    Holiday Weight and How to Avoid It    [employer] by Kailyn Bianchi, PhD  https://www.obesityaction.org/resources/holiday-weight-and-wzr-is-lfijw-it/      When we think about the holidays many things come to mind - gifts, shopping, parties, family, decorating, long to-do lists --and delicious holiday treats.    Strategies for Avoiding Holiday Weight Gain  The holiday season is a busy time, and there are eating opportunities everywhere we go, such as family gatherings, office parties with trays of home-baked treats in the lunchroom, holiday and tcq-by-zkhzecon programs at our kids’ schools, treat samples being given away as we make our way through the stores to do our holiday shopping and catalogs in our mailboxes with mouth-watering photo spreads on every page. We’re really busy, perhaps too busy to prepare the healthy meals we might otherwise prepare.    Here are a few tips that will help you negotiate this joyful time with minimum risk to your weight management goals:    Focus on maintaining your current weight. Challenging yourself to lose weight over the holidays is setting yourself up for failure.  Don’t gorge on any special holiday food because you only get to eat it once a year. With luck, you’ll still be around to enjoy it next year. On the other hand, don’t deprive yourself of anything you want to taste. Instead, take a mindful bite, savoring the sight, taste, aroma, mouth feel and sound of each special holiday treat. Eating like this leads to increased pleasure, quicker satisfaction and decreased risk for weight gain.  Avoid the trap of thinking you can eat what you want because you can just start over in the New Year. It doesn’t get any easier just because it’s January - there are always other reasons to indulge and to celebrate.  Keep up your exercise routine. This will also help reduce holiday stress.  Keep tabs on yourself. Write down what  you eat, weigh yourself if you want to or try on your favorite clothes to make sure they still fit.  Create meaning beyond the food by creating new traditions that have nothing to do with food. For example, change “in our family we always have chocolate cinnamon bread with whipped cream on Deepika morning” into “in our family we always play in the snow (or on the beach), or go for a long walk/take food and gifts to the homeless shelter on [place] morning.”  Sometimes, eating a particular food is our way of remembering a lost loved one. If that applies to you, find another way to remember them, like sharing memories with family members.  Remember all the reasons why reaching and maintaining a healthy weight is important to you.  Remember, unless you’re an elite athlete, you’re unlikely to be able to “exercise off” weeks of overindulgence.  Strategies for Holiday Parties  Most of us love holiday parties and look forward to them all year. We get to dress up and go to nice places, spend time with our nearest and dearest, enjoy our favorite holiday music and engage in the traditions that are meaningful to us. Despite all of the excitement, parties can also be minefields when it comes to honoring our healthy lifestyle goals. When we love parties, we may over-indulge as a way of intensifying the positive emotions we’re already feeling, and when we dislike parties, we may over-indulge in an effort to distract ourselves from the emotional discomfort that we’re feeling.    Here are a few tips that will help you get through holiday parties without sabotaging your goals:    Avoid wearing baggy clothing that allows you to expand as you eat.  After you’ve eaten, stay away from the food tables at the party.  Keep your hands busy by finding a way to help out. It’s the best way to distract yourself from the food.  Avoid alcohol. When we drink, we’re more likely to abandon healthy eating.  Fill up with water and other low-calorie  drinks.  Take a healthy dish for the pot luck - something you can eat: consider salad, fruit, raw vegetables and a healthy dip.  Focus on your relationships, not on the food - learn to focus on enjoying the people and the special holiday experiences, on building special memories for yourself and your family.  Meeting new people is another good way of distracting yourself from the food. If you’re shy, simply be a good listener.  Plan ahead. The best kind of plan, when it comes to food, is about what you are going to eat - not about what you’re not going to eat. If we focus on what we can’t eat (or what we think we shouldn’t eat), this kind of thinking can set us up for failure because it simply leaves us feeling deprived.  Don’t arrive completely famished - you’ll be more likely to eat in a way you’ll later regret. Plan to eat on the light side both before and after the event. Think about your meal plan for the day, and leave yourself some room to eat at the party.  Coping with Holiday Stress  As you know, the holiday season can be joyful and stressful at the same time. There’s so much to do, being around family can sometimes be difficult and often, we set ourselves the goal of creating the “perfect” holiday. Being stressed puts us at risk for stress-based eating in an effort to cope.    Here are some strategies you can use to reduce your stress levels:    Focus on what you’re grateful for.  Practice deep breathing whenever you feel overwhelmed.  Keep up your exercise routine.  Remind yourself to do just one thing at a time.  Remember -- you cannot do more than your best.  Be willing to say “no” to some events, tasks or requests. Sometimes this is the best way we can take care of ourselves.  Create a holiday season schedule for yourself. Schedule and prioritize everything you need to get done.  Reduce your expectations - aim for “good enough,” not “perfect.”  If you’re alone during the holidays, pamper yourself and  find a way to help others who are less fortunate. This will help reduce your loneliness.  If your relationships with family members are strained, remember that over-indulging in your favorite holiday comfort foods is not going to change how they behave towards you!  Create fun times for yourself. Having fun is a great way of reducing stress!  I hope these tips will help you not just get through the holidays, but that they’ll allow you to feel reassured that you can still have a fun and meaningful time without having to sacrifice your weight management goals. Wishing you a happy, healthy and meaningful holiday season!          Medication use and SEs reviewed with patient.    Return in about 5 months (around 5/12/2025) for weight management via clinic.    Patient verbalizes understanding.    DOCUMENTATION OF TIME SPENT: Code selection for this visit was based on time spent : 20 minutes on date of service in preparing to see the patient, obtaining and/or reviewing separately obtained history, performing a medically appropriate examination, counseling and educating the patient/family/caregiver, ordering medications or testing, referring and communicating with other healthcare providers, documenting clinical information in the electronic medical record, independently interpreting results and communicating results to the patient/family/caregiver and care coordination with the patient's other providers.      Answers submitted by the patient for this visit:  Medical Weight Loss Follow Up (Submitted on 12/5/2024)  If greater than 5/1O how would you grade your coping mechanisms?: moderate

## 2024-12-24 ENCOUNTER — TELEPHONE (OUTPATIENT)
Dept: INTERNAL MEDICINE CLINIC | Facility: CLINIC | Age: 54
End: 2024-12-24

## 2025-06-08 DIAGNOSIS — Z51.81 ENCOUNTER FOR THERAPEUTIC DRUG MONITORING: ICD-10-CM

## 2025-06-08 DIAGNOSIS — E78.00 HYPERCHOLESTEREMIA: ICD-10-CM

## 2025-06-08 DIAGNOSIS — E66.812 CLASS 2 SEVERE OBESITY WITH SERIOUS COMORBIDITY AND BODY MASS INDEX (BMI) OF 35.0 TO 35.9 IN ADULT, UNSPECIFIED OBESITY TYPE (HCC): ICD-10-CM

## 2025-06-08 DIAGNOSIS — I10 HYPERTENSION, UNSPECIFIED TYPE: ICD-10-CM

## 2025-06-08 DIAGNOSIS — E66.01 CLASS 2 SEVERE OBESITY WITH SERIOUS COMORBIDITY AND BODY MASS INDEX (BMI) OF 35.0 TO 35.9 IN ADULT, UNSPECIFIED OBESITY TYPE (HCC): ICD-10-CM

## 2025-06-08 DIAGNOSIS — R73.03 PREDIABETES: ICD-10-CM

## 2025-06-09 NOTE — TELEPHONE ENCOUNTER
Requesting zepbound 15  LOV: 12/12/24  RTC: 5 months  Filled: 12/12/24 #6ml with 1 refills    Future Appointments   Date Time Provider Department Center   1/8/2026 10:40 AM Mel Guillen APRN [facility]     Sending mychart. Need for sooner appt

## 2025-06-10 NOTE — PROGRESS NOTES
Luis Alberto Craig is a 54 year old male presents today for follow-up on medical weight loss program for the treatment of overweight, obesity, or morbid obesity with associated prediabetes, HTN, Hypercholesterolemia.    S:  Current weight   Wt Readings from Last 6 Encounters:   06/11/25 241 lb (109.3 kg)   12/12/24 250 lb (113.4 kg)   12/03/24 256 lb (116.1 kg)   07/03/24 275 lb (124.7 kg)   04/15/24 293 lb 6 oz (133.1 kg)   03/18/24 (!) 308 lb (139.7 kg)    AND BMI Body mass index is 31.36 kg/m²..    Patient has lost 9# since LOV 6 month ago, He has been compliant with therapy. Feeling at a weight plateau and would like to lose more. No consistency around fitness.    Testing/consult completed since LOV: Dietician: no.    Labs: sub optimal Vitamin D and B12    Social hx and PMH reviewed. Employed as  for [employer].     [facility] Follow Up    Question 6/10/2025 11:44 AM CDT - Filed by Patient   Please describe a success moment: Just getting closer to my goal of 225lbs   Please describe a challenging moment/needs for improvement:    Please complete this 24 hour food journal, listing everything you had to eat in the past day. Include the average time of day you ate these meals at    List foods, qty and prep for breakfast: Watermelon and coffee   List foods, qty and prep for lunch. Chicken sandwich   List foods, qty and prep for dinner. Bratwurst   List foods, qty and prep for snacks. Dried apricots   List the types and qty of fluids consumed Water and unsweetened tea   On average, how many meals did you eat out per week? 1   Exercise    How many days per week are you active or exercise 2   On average, how many days were anaerobic (strength/resistance) exercises performed? 2   On average, how many days were aerobic (cardio) exercises performed?    Perceived level of exertion on a scale of 1-5, with 5 being very intense:    Stress    Average stress level on a scale of 1-10, with 10 being  extremely stressed: 7   If greater than 5/1O how would you grade your coping mechanisms? moderate   Sleep hours and integrity    How many hours of uninterrupted sleep do you get a night: 6   Do you feel rested in the morning: Yes   If no, what may have been disrupting your sleep?    Please list any goal(s) for your next visit Just staying on track to get to 225       REVIEW OF SYSTEMS:  GENERAL: feels well otherwise  EYES: denies vision changes or high pain/pressure.  LUNGS: denies shortness of breath with exertion  CARDIOVASCULAR: denies chest pain on exertion, denies palpitations or pedal edema  GI: denies abdominal pain.  No N/V/D/C  MUSCULOSKELETAL: no acute joint or muscle pain  NEURO: denies headaches  PSYCH: denies change in behavior or mood, denies feeling sad or depressed    EXAM:  /80   Pulse 89   Resp 18   Ht 6' 1.5\" (1.867 m)   Wt 241 lb (109.3 kg)   BMI 31.36 kg/m² \  GENERAL: well developed, well nourished, in no apparent distress, obese  EYES: conjunctiva pink, sclera non icteric, PERRLA  LUNGS: CTA in all fields, breathing non labored  CARDIO: RRR without murmur, normal S1 and S2 without clicks or gallops, no pedal edema.  GI: +BS  NEURO/MS: motor and sensory grossly intact  PSYCH: pleasant, cooperative, normal mood and affect    ASSESSMENT AND PLAN:  Reviewed Initial Weight Data and Goal Weight Loss:  Weight Calculations  Initial Weight: 275.6 lbs  Initial Weight Date: 07/03/24  Today's Weight: 241 lbs  5% Goal: 13.78  10% Goal: 27.56  Total Weight Loss: 34.6 lbs    Encounter Diagnoses   Name Primary?    Encounter for therapeutic drug monitoring Yes    Class 2 severe obesity with serious comorbidity and body mass index (BMI) of 35.0 to 35.9 in adult, unspecified obesity type (HCC)     Prediabetes     Hypertension, unspecified type     Hypercholesteremia     History of morbid obesity          No orders of the defined types were placed in this encounter.      Meds & Refills for this  Visit:  Requested Prescriptions     Signed Prescriptions Disp Refills    Tirzepatide-Weight Management (ZEPBOUND) 15 MG/0.5ML Subcutaneous Solution Auto-injector 6 mL 1     Sig: Inject 15 mg into the skin once a week.    naltrexone 50 MG Oral Tab 15 tablet 2     Sig: Take 0.5 tablets (25 mg total) by mouth daily. For cravings    buPROPion  MG Oral Tablet 24 Hr 30 tablet 2     Sig: Take 1 tablet (150 mg total) by mouth every morning.       Imaging & Consults:  OP REFERRAL TO DIETITIAN [facility] ([facility] USE ONLY)      Plan:  Patient has lost 9# since LOV 6 month ago on Zepbound 15 mg weekly with a total weight loss of 34# since initial consult on 7/3/2024 with initial weight of 275# and BMI 35.79. Weight loss goal: lose 50# and get in shape. Goal of being 225#.  CPM, add Nal/Bup XL as directed. Refer to dietician.  on strength training. See patient instructions below for additional plans and patient counseling.      Patient Instructions   Continue making lifestyle changes that focus on good nutrition, regular exercise and stress management.    Medication Plan: Continue Zepbound at current dose. Start generic alternative to Contrave ([email or web address].[email or web address]) with Naltrexone and Bupropion XL. Contrave is an anti-obesity medication (AOM) that has been on the market for 10 years with a weight loss rate of 12-15% total body weight when taken in combination with regular exercise of at least 150 minutes/week and a reduced calorie nutrition plan. Naltrexone and Bupropion XL help to reduce hunger and cravings by action in the brain. The most common side effects can include nausea, dry mouth and constipation. Please do not take Naltrexone with any opioid (codeine, norco, tramadol/ultram, percocet are some examples) based medication as the effect of the medication will likely be blocked by Naltrexone. It is safe to take Naltrexone with an over the counter pain medication, if needed, as this will not be affected.    The  medication doses can be increased as early as 2-4 weeks from start of treatment if you are not experiencing adequate appetite and craving control. If this is the case, please send me a Snappy shuttlet message with your status and home scale weight so I can determine if an increase is warranted.      Tips while taking an injectable weight loss medication:    Be an intuitive eater. Listen to your hunger and fullness signals, stopping when you are full.  Consume protein and produce in your day, striving for a rainbow of color of produce.  Reduce portions to staring size of 1 cup size and check in with your gut to see if you are full. Set the timer to slow down your eating pace to allow for 15-20 minutes to complete a meal. Recommend following the \"2 bite rule\".  Reduce refined sugars and high fat foods, as they may contribute to greater side effects of nausea and heartburn.  Stop eating 3 hours before bedtime to allow your food to digest.  Remain hydrated with water or non caloric and non caffeine beverages.  Use over the counter angel lozenge/supplement to help reduce nausea if needed.  If you have been off your medication for more than 2 weeks please notify our office to determine next dosing, as return to previous dose may not be appropriate or tolerated.  Zepbound can be kept at room temperature for up to 3 weeks.      Next steps to work on before next visit include:  Schedule consult with dietician. Add strength training with goal of 3x/week for 30-60 minute sessions.    Developing a balanced fitness routine takes time. Begin to build the mentality of fitness 4 function! Start gradually and safely to continue to lose and maintain weight loss, build strength and prevent injury. Fitness not only supports a healthy body, but also a healthy mind with reducing stress and lifting your mood. I recommend a routine of 3x/week of cardio with varying intensity, 3x/week of strength training and 1x/week of  stretching/flexibility/balance- such as Yoga.  Three ingredients necessary for making a habit of exercise for a lifetime includes: convenience, budget friendly and most importantly FUN! Changing up your exercise routine seasonally can keep you motivated and expose you to new interests and challenges! Step outside your comfort zone and give it a try, you never know where the challenge will lead you and what changes you may see!    Learning to Apply the FITT Principle to Your Exercise Plan  One of the biggest challenges with exercise is knowing where to start and how to get better. To improve your fitness, you must self-monitor your workouts and make changes when necessary. One of the best tools you can use to help you is the FITT Principle.  FITT is an acronym that outlines the basic components of a successful exercise plan.  Frequency - How often you exercise  Intensity - How hard you exercise  Time - How long you exercise  Type - What kind of exercise you do  How Often Should You Exercise?  It is a myth that you must work out for extended periods every day to lose weight and keep it off. What is considered an “effective” exercise varies between people. Factors that affect this include age, fitness level, mobility, health conditions, etc.  Before you begin an exercise plan and decide how often to exercise, consider these key factors.  What is your current fitness level? What are you able to do?  What is your schedule? How much time do you have to exercise?  What health and fitness goals do you want to achieve?  Build your plan off of the answers to these questions. If you are a busy mom and you want to lose weight to gain more energy, you might not have a lot of time. Maybe your only form of exercise is keeping up with your kids. In this case, you may want to start small. For example, you could plan workouts for weekend afternoons when your spouse can watch the kids.  How Hard Should You Exercise?  The best way to  see how hard you are working is to monitor your heart rate. You can do this by wearing a fitness tracker, heart rate monitor or smart watch. You can also feel for your heartbeat and count it over a 15-second period.  Low-intensity - An activity level you can continue for a long time (walking)  Moderate-intensity - An activity level that will boost your heart rate and require effort to maintain (biking)  High-intensity - An activity level that feels like an all-out effort. Your heart rate is high and you can't speak complete sentences between breaths  How Long Should You Exercise?  The time you spend exercising will usually depend on what you are doing. Health experts recommend at least 30 minutes of cardio exercise each workout. However, if you are doing a strength-based exercise, you will likely pay more attention to your number of “sets” and “reps.” Regardless, many other factors are involved.  The amount of time you have  How long it takes you to feel fatigued  Weather, time of day  Health conditions  What Should You Do for Exercise?  Should you hit up the elliptical at the gym? Should you go for a hike? The type of exercise you do depends on what you like and what results you want.  For example, if you want to improve your cardio-vascular fitness and you love the outdoors, try exercises like hiking, swimming and biking. If you want to improve your muscle strength and you enjoy the convenience of the gym, try using free weights or machine weights. You can also use your body weight for exercises like push-ups, chin-ups, planks, etc.  The FITT Principle: Final Considerations  You can use the FITT Principle for cardio exercise, strength-based exercise, stretching and more. However, before you start any exercise plan, first consult with your healthcare provider. They will help you develop a plan that is safe and effective. By using the FITT Principle, you can not only improve your fitness level with time, but you can  also prevent serious injury.      Build Muscle & Lose Fat  The great fat vs muscle diagram below paints a clear picture of why it’s so important for you to build muscle in order to lose fat.  Maybe you’ve wondered about muscle vs fat and why you need to build muscle to lose fat, look slim and keep the inches off.  Well, look no further! With the fat vs muscle diagram below you’ll see why healthy permanent weight loss requires you to build muscle to lose fat.  Fat vs Muscle Diagram  The facts are clear. The best way to lose fat and look slimmer is to build muscle. Since one picture’s worth a thousand words, here’s 5 lbs of muscle vs fat of the same weight. Notice 5 lbs of fat is three times bigger.    This means that if you were to build 5 lbs of muscle and lose 5 lbs of fat, you would weigh exactly the same, but look smaller and firmer.  So imagine if it were 25 lbs or 50 lbs of lost fat vs muscle gained.  This is why it’s possible for you to lose fat inches when exercising, yet show no change in scale weight. And can you see how firm the muscle looks compared to the lumpy, tapioca pudding consistency of fat?  Build Muscle to Lose Fat Benefits  Although daily physical activity, like walking, swimming and aerobics are essential to good heart health and weight management, combining muscle building weight training with cardiovascular exercise, gives you an unbeatable combination to lose fat and keep it off permanently.  Of course it takes a few weeks before you see any measurable changes. But you’ll start to build muscle, lose fat and burn more calories from the moment you begin weight training. Building muscle helps you:  1. Burn more calories. Unlike fat, muscle beefs up your metabolism to help you burn about 70% more calories than fat can.  2. Improve appearance. When done properly, strength training can greatly improve your posture and help to prevent joint pain.  3. Build confidence. Strong muscles and joints  increase your level of confidence in your abilities to perform many lifestyle activities.  4. Prevent injury. Strength training can build stronger muscles and more limber, flexible joints, which play a crucial role in preventing injury.  5. Increase bone density. Weight bearing activities improve your bone density and reduce bone loss. This helps to prevent osteoporosis.  Studies show that weight training combined with aerobics and stretching is the best way to build a strong, firm body and keep it that way.  So, if you want to look and feel better than ever, you need to build muscle to lose fat.     Taken from [email or web address] by Terrell Calhoun      Medication use and SEs reviewed with patient.    Return in about 4 months (around 10/11/2025) for weight management via Telemedicine Visit and keep in clinic in January.    Patient verbalizes understanding.    DOCUMENTATION OF TIME SPENT: Code selection for this visit was based on time spent : 30 minutes on date of service in preparing to see the patient, obtaining and/or reviewing separately obtained history, performing a medically appropriate examination, counseling and educating the patient/family/caregiver, ordering medications or testing, referring and communicating with other healthcare providers, documenting clinical information in the electronic medical record, independently interpreting results and communicating results to the patient/family/caregiver and care coordination with the patient's other providers.    Answers submitted by the patient for this visit:  Medical Weight Loss Follow Up (Submitted on 6/10/2025)  If greater than 5/1O how would you grade your coping mechanisms?: moderate

## 2025-06-11 ENCOUNTER — OFFICE VISIT (OUTPATIENT)
Dept: INTERNAL MEDICINE CLINIC | Facility: CLINIC | Age: 55
End: 2025-06-11
Payer: COMMERCIAL

## 2025-06-11 VITALS
BODY MASS INDEX: 31.26 KG/M2 | SYSTOLIC BLOOD PRESSURE: 122 MMHG | DIASTOLIC BLOOD PRESSURE: 80 MMHG | HEART RATE: 89 BPM | HEIGHT: 73.5 IN | WEIGHT: 241 LBS | RESPIRATION RATE: 18 BRPM

## 2025-06-11 DIAGNOSIS — E78.00 HYPERCHOLESTEREMIA: ICD-10-CM

## 2025-06-11 DIAGNOSIS — R73.03 PREDIABETES: ICD-10-CM

## 2025-06-11 DIAGNOSIS — Z51.81 ENCOUNTER FOR THERAPEUTIC DRUG MONITORING: ICD-10-CM

## 2025-06-11 DIAGNOSIS — E66.01 CLASS 2 SEVERE OBESITY WITH SERIOUS COMORBIDITY AND BODY MASS INDEX (BMI) OF 35.0 TO 35.9 IN ADULT, UNSPECIFIED OBESITY TYPE (HCC): ICD-10-CM

## 2025-06-11 DIAGNOSIS — Z51.81 ENCOUNTER FOR THERAPEUTIC DRUG MONITORING: Primary | ICD-10-CM

## 2025-06-11 DIAGNOSIS — Z86.39 HISTORY OF MORBID OBESITY: ICD-10-CM

## 2025-06-11 DIAGNOSIS — E66.812 CLASS 2 SEVERE OBESITY WITH SERIOUS COMORBIDITY AND BODY MASS INDEX (BMI) OF 35.0 TO 35.9 IN ADULT, UNSPECIFIED OBESITY TYPE (HCC): ICD-10-CM

## 2025-06-11 DIAGNOSIS — I10 HYPERTENSION, UNSPECIFIED TYPE: ICD-10-CM

## 2025-06-11 PROCEDURE — 3074F SYST BP LT 130 MM HG: CPT | Performed by: NURSE PRACTITIONER

## 2025-06-11 PROCEDURE — 3008F BODY MASS INDEX DOCD: CPT | Performed by: NURSE PRACTITIONER

## 2025-06-11 PROCEDURE — 3079F DIAST BP 80-89 MM HG: CPT | Performed by: NURSE PRACTITIONER

## 2025-06-11 PROCEDURE — 99214 OFFICE O/P EST MOD 30 MIN: CPT | Performed by: NURSE PRACTITIONER

## 2025-06-11 RX ORDER — TIRZEPATIDE 15 MG/.5ML
15 INJECTION, SOLUTION SUBCUTANEOUS WEEKLY
Qty: 2 ML | Refills: 0 | Status: SHIPPED | OUTPATIENT
Start: 2025-06-11 | End: 2025-06-11

## 2025-06-11 RX ORDER — BUPROPION HYDROCHLORIDE 150 MG/1
150 TABLET ORAL EVERY MORNING
Qty: 30 TABLET | Refills: 2 | Status: SHIPPED | OUTPATIENT
Start: 2025-06-11

## 2025-06-11 RX ORDER — TIRZEPATIDE 15 MG/.5ML
INJECTION, SOLUTION SUBCUTANEOUS
Qty: 6 ML | Refills: 1 | OUTPATIENT
Start: 2025-06-11

## 2025-06-11 RX ORDER — NALTREXONE HYDROCHLORIDE 50 MG/1
25 TABLET, FILM COATED ORAL DAILY
Qty: 15 TABLET | Refills: 2 | Status: SHIPPED | OUTPATIENT
Start: 2025-06-11

## 2025-06-11 RX ORDER — TIRZEPATIDE 15 MG/.5ML
15 INJECTION, SOLUTION SUBCUTANEOUS WEEKLY
Qty: 6 ML | Refills: 1 | Status: SHIPPED | OUTPATIENT
Start: 2025-06-11

## 2025-06-11 NOTE — PATIENT INSTRUCTIONS
Continue making lifestyle changes that focus on good nutrition, regular exercise and stress management.    Medication Plan: Continue Zepbound at current dose. Start generic alternative to Contrave (www.contrave.com) with Naltrexone and Bupropion XL. Contrave is an anti-obesity medication (AOM) that has been on the market for 10 years with a weight loss rate of 12-15% total body weight when taken in combination with regular exercise of at least 150 minutes/week and a reduced calorie nutrition plan. Naltrexone and Bupropion XL help to reduce hunger and cravings by action in the brain. The most common side effects can include nausea, dry mouth and constipation. Please do not take Naltrexone with any opioid (codeine, norco, tramadol/ultram, percocet are some examples) based medication as the effect of the medication will likely be blocked by Naltrexone. It is safe to take Naltrexone with an over the counter pain medication, if needed, as this will not be affected.    The medication doses can be increased as early as 2-4 weeks from start of treatment if you are not experiencing adequate appetite and craving control. If this is the case, please send me a [employer] message with your status and home scale weight so I can determine if an increase is warranted.      Tips while taking an injectable weight loss medication:    Be an intuitive eater. Listen to your hunger and fullness signals, stopping when you are full.  Consume protein and produce in your day, striving for a rainbow of color of produce.  Reduce portions to staring size of 1 cup size and check in with your gut to see if you are full. Set the timer to slow down your eating pace to allow for 15-20 minutes to complete a meal. Recommend following the \"2 bite rule\".  Reduce refined sugars and high fat foods, as they may contribute to greater side effects of nausea and heartburn.  Stop eating 3 hours before bedtime to allow your food to digest.  Remain hydrated with  water or non caloric and non caffeine beverages.  Use over the counter angel lozenge/supplement to help reduce nausea if needed.  If you have been off your medication for more than 2 weeks please notify our office to determine next dosing, as return to previous dose may not be appropriate or tolerated.  Zepbound can be kept at room temperature for up to 3 weeks.      Next steps to work on before next visit include:  Schedule consult with dietician. Add strength training with goal of 3x/week for 30-60 minute sessions.    Developing a balanced fitness routine takes time. Begin to build the mentality of fitness 4 function! Start gradually and safely to continue to lose and maintain weight loss, build strength and prevent injury. Fitness not only supports a healthy body, but also a healthy mind with reducing stress and lifting your mood. I recommend a routine of 3x/week of cardio with varying intensity, 3x/week of strength training and 1x/week of stretching/flexibility/balance- such as Yoga.  Three ingredients necessary for making a habit of exercise for a lifetime includes: convenience, budget friendly and most importantly FUN! Changing up your exercise routine seasonally can keep you motivated and expose you to new interests and challenges! Step outside your comfort zone and give it a try, you never know where the challenge will lead you and what changes you may see!    Learning to Apply the FITT Principle to Your Exercise Plan  One of the biggest challenges with exercise is knowing where to start and how to get better. To improve your fitness, you must self-monitor your workouts and make changes when necessary. One of the best tools you can use to help you is the FITT Principle.  FITT is an acronym that outlines the basic components of a successful exercise plan.  Frequency - How often you exercise  Intensity - How hard you exercise  Time - How long you exercise  Type - What kind of exercise you do  How Often Should  You Exercise?  It is a myth that you must work out for extended periods every day to lose weight and keep it off. What is considered an “effective” exercise varies between people. Factors that affect this include age, fitness level, mobility, health conditions, etc.  Before you begin an exercise plan and decide how often to exercise, consider these key factors.  What is your current fitness level? What are you able to do?  What is your schedule? How much time do you have to exercise?  What health and fitness goals do you want to achieve?  Build your plan off of the answers to these questions. If you are a busy mom and you want to lose weight to gain more energy, you might not have a lot of time. Maybe your only form of exercise is keeping up with your kids. In this case, you may want to start small. For example, you could plan workouts for weekend afternoons when your spouse can watch the kids.  How Hard Should You Exercise?  The best way to see how hard you are working is to monitor your heart rate. You can do this by wearing a fitness tracker, heart rate monitor or smart watch. You can also feel for your heartbeat and count it over a 15-second period.  Low-intensity - An activity level you can continue for a long time (walking)  Moderate-intensity - An activity level that will boost your heart rate and require effort to maintain (biking)  High-intensity - An activity level that feels like an all-out effort. Your heart rate is high and you can't speak complete sentences between breaths  How Long Should You Exercise?  The time you spend exercising will usually depend on what you are doing. Health experts recommend at least 30 minutes of cardio exercise each workout. However, if you are doing a strength-based exercise, you will likely pay more attention to your number of “sets” and “reps.” Regardless, many other factors are involved.  The amount of time you have  How long it takes you to feel fatigued  Weather, time  of day  Health conditions  What Should You Do for Exercise?  Should you hit up the elliptical at the gym? Should you go for a hike? The type of exercise you do depends on what you like and what results you want.  For example, if you want to improve your cardio-vascular fitness and you love the outdoors, try exercises like hiking, swimming and biking. If you want to improve your muscle strength and you enjoy the convenience of the gym, try using free weights or machine weights. You can also use your body weight for exercises like push-ups, chin-ups, planks, etc.  The FITT Principle: Final Considerations  You can use the FITT Principle for cardio exercise, strength-based exercise, stretching and more. However, before you start any exercise plan, first consult with your healthcare provider. They will help you develop a plan that is safe and effective. By using the FITT Principle, you can not only improve your fitness level with time, but you can also prevent serious injury.      Build Muscle & Lose Fat  The great fat vs muscle diagram below paints a clear picture of why it’s so important for you to build muscle in order to lose fat.  Maybe you’ve wondered about muscle vs fat and why you need to build muscle to lose fat, look slim and keep the inches off.  Well, look no further! With the fat vs muscle diagram below you’ll see why healthy permanent weight loss requires you to build muscle to lose fat.  Fat vs Muscle Diagram  The facts are clear. The best way to lose fat and look slimmer is to build muscle. Since one picture’s worth a thousand words, here’s 5 lbs of muscle vs fat of the same weight. Notice 5 lbs of fat is three times bigger.    This means that if you were to build 5 lbs of muscle and lose 5 lbs of fat, you would weigh exactly the same, but look smaller and firmer.  So imagine if it were 25 lbs or 50 lbs of lost fat vs muscle gained.  This is why it’s possible for you to lose fat inches when exercising,  yet show no change in scale weight. And can you see how firm the muscle looks compared to the lumpy, tapioca pudding consistency of fat?  Build Muscle to Lose Fat Benefits  Although daily physical activity, like walking, swimming and aerobics are essential to good heart health and weight management, combining muscle building weight training with cardiovascular exercise, gives you an unbeatable combination to lose fat and keep it off permanently.  Of course it takes a few weeks before you see any measurable changes. But you’ll start to build muscle, lose fat and burn more calories from the moment you begin weight training. Building muscle helps you:  1. Burn more calories. Unlike fat, muscle beefs up your metabolism to help you burn about 70% more calories than fat can.  2. Improve appearance. When done properly, strength training can greatly improve your posture and help to prevent joint pain.  3. Build confidence. Strong muscles and joints increase your level of confidence in your abilities to perform many lifestyle activities.  4. Prevent injury. Strength training can build stronger muscles and more limber, flexible joints, which play a crucial role in preventing injury.  5. Increase bone density. Weight bearing activities improve your bone density and reduce bone loss. This helps to prevent osteoporosis.  Studies show that weight training combined with aerobics and stretching is the best way to build a strong, firm body and keep it that way.  So, if you want to look and feel better than ever, you need to build muscle to lose fat.     Taken from [email or web address] by Terrell Calhoun

## 2025-06-11 NOTE — TELEPHONE ENCOUNTER
Approved 1 month only refill. Have him schedule in clinic with Shivani in the next month and keep his f/u in 1/2026. Thanks

## 2025-07-16 ENCOUNTER — OFFICE VISIT (OUTPATIENT)
Dept: INTERNAL MEDICINE CLINIC | Facility: CLINIC | Age: 55
End: 2025-07-16
Payer: COMMERCIAL

## 2025-07-16 DIAGNOSIS — Z71.3 ENCOUNTER FOR WEIGHT LOSS COUNSELING: ICD-10-CM

## 2025-07-16 DIAGNOSIS — E66.812 CLASS 2 SEVERE OBESITY WITH SERIOUS COMORBIDITY AND BODY MASS INDEX (BMI) OF 35.0 TO 35.9 IN ADULT, UNSPECIFIED OBESITY TYPE (HCC): ICD-10-CM

## 2025-07-16 DIAGNOSIS — E66.01 CLASS 2 SEVERE OBESITY WITH SERIOUS COMORBIDITY AND BODY MASS INDEX (BMI) OF 35.0 TO 35.9 IN ADULT, UNSPECIFIED OBESITY TYPE (HCC): ICD-10-CM

## 2025-07-16 DIAGNOSIS — E78.00 HYPERCHOLESTEREMIA: ICD-10-CM

## 2025-07-16 DIAGNOSIS — E66.811 OBESITY (BMI 30.0-34.9): ICD-10-CM

## 2025-07-16 DIAGNOSIS — I10 HYPERTENSION, UNSPECIFIED TYPE: ICD-10-CM

## 2025-07-16 DIAGNOSIS — R73.03 PREDIABETES: ICD-10-CM

## 2025-07-16 DIAGNOSIS — Z86.39 HISTORY OF MORBID OBESITY: ICD-10-CM

## 2025-07-17 VITALS — BODY MASS INDEX: 31 KG/M2 | WEIGHT: 239 LBS

## 2025-07-17 NOTE — PROGRESS NOTES
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, commercial       Labs:   HgbA1C   Date Value Ref Range Status   07/17/2024 5.2 <5.7 % Final     Comment:      Normal HbA1C:     <5.7%   Pre-Diabetic:     5.7 - 6.4%   Diabetic:         >6.4%   Diabetic Control: <7.0%         Triglycerides   Date Value Ref Range Status   07/17/2024 95 30 - 149 mg/dL Final     Comment:     Reference interval for fasting triglycerides  Desirable: <150 mg/dL  Borderline: 150-199 mg/dL  High: 200-499 mg/dL  Very High: >=500 mg/dL           LDL Cholesterol   Date Value Ref Range Status   07/17/2024 96 <100 mg/dL Final     Comment:     Optimal            <100 mg/dL   Near/Above OptimaL 100-129 mg/dL   Borderline High    130-159 mg/dL   High               160-189 mg/dL    Very High          >190 mg/dL          HDL Cholesterol   Date Value Ref Range Status   07/17/2024 41 40 - 59 mg/dL Final     Comment:     Interpretive Information:   An HDL cholesterol <40 mg/dL is low and constitutes a coronary heart disease risk factor. An HDL cholesterol >60 mg/dL is a negative risk factor for coronary heart disease.         AST   Date Value Ref Range Status   07/17/2024 17 <34 U/L Final     ALT   Date Value Ref Range Status   07/17/2024 17 10 - 49 U/L Final         Height:  Ht Readings from Last 1 Encounters:   06/11/25 6' 1.5\" (1.867 m)       Weight:   Wt Readings from Last 2 Encounters:   07/16/25 239 lb (108.4 kg)   06/11/25 241 lb (109.3 kg)       BMI Readings from Last 1 Encounters:   07/16/25 31.10 kg/m²       Weight change: Decrease of 2 lbs in the past month      Current weight loss medication: Zepbound 15 mg, Naltrexone 50 mg, Buproprion 150 mg    Current Diet: Inadequate protein intake, low calorie, small portions.     Primary cook  in household: wife    Who is responsible for grocery shopping: wife    Food/Beverage Intake: oral recall  Breakfast: Overnight oats with oats, Greek yogurt, almond milk, fruit  Lunch: Cucumbers with Italian vinagrette  Dinner: Taco Pizza-small portion  Snacks: none.  Not much snacking typically  Beverages: Water, unsweetened tea    Meal pattern: 3 meals/d, 0-1 snacks/d        Alcohol Intake: not discussed    Estimated current caloric intake: 950 cals/d based on diet recall    Estimated caloric needs for weight loss: 2100 cals/d for 1 pounds/week weight loss,  120-150 gms protein daily    Physical Activity: 2-3 hrs/week , strength training.  Walks a lot at work    Food Journal: no    Spent 45 minutes in consultation with the patient.      Nutrition Intervention/Education:  Comprehensive nutrition education and evaluation provided for weight loss. Pt's diet consists of high quality foods.  Food groups are unbalanced with some meals containing little or inadequate protein. Recommended adding 2 scoops protein powder to daily overnight oats.  Balancing all meals with a high protein food was recommended.  List of food and protein content was provided.  Prioritizing protein by eating the protein food first was encouraged.  GLP-1 nutrition guide was provided in written form and section on prioritizing protein was reviewed.  Protein goal was determined and pt was encouraged to consume between 30-40 gms/meal.  Discussed role of protein in blood sugar and hunger control.  Protein deficiency with resulting muscle loss, reduced immunity and energy was reviewed. Patient agreed to goals below.    Goals:   Fortify overnight oats with 2 scoops protein powder  Balance all meals with a high protein food  Eat protein first with meals when foods are not combined  Aim for 120-150 gms protein daily    Monitoring/Evaluation:  [facility] scheduled [facility]. Follow up as needed.          Derek Bajwa MS, RD,  [facility]

## (undated) NOTE — Clinical Note
TCM call completed. A TE was sent to triage for an appointment request. Pt is aware [facility] care recommendations. Thank you.

## (undated) NOTE — Clinical Note
Dear Nadeen Loomis,  It is with great pleasure that we were able to provide treatment to Ronen Mcbride in the [facility] today. In order to keep you informed on your patient's care I have attached the visit history.    Our providers and staff here at

## (undated) NOTE — Clinical Note
Thank you for referring Luis Alberto to the [facility]. Consult was completed today via person.  I have ordered labs, referred for a nutrition consultation with our dietician.  I counseled on the importance of lifestyle intervention in adjunct with medication and increased Zepbound dose for treatment with follow-up advised in about 3 months.